# Patient Record
Sex: FEMALE | Race: WHITE | NOT HISPANIC OR LATINO | Employment: FULL TIME | URBAN - NONMETROPOLITAN AREA
[De-identification: names, ages, dates, MRNs, and addresses within clinical notes are randomized per-mention and may not be internally consistent; named-entity substitution may affect disease eponyms.]

---

## 2017-01-12 ENCOUNTER — MYC MEDICAL ADVICE (OUTPATIENT)
Dept: FAMILY MEDICINE | Facility: CLINIC | Age: 37
End: 2017-01-12

## 2017-01-12 NOTE — TELEPHONE ENCOUNTER
Clinic options did not work for Pt with Dr. Smith and Tessa LORA-BRANT.    Appt scheduled 3:20 pm THOM Gonzalez [facility] Sec

## 2017-01-12 NOTE — TELEPHONE ENCOUNTER
[facility] has available appointments at 1020, 1120, and 320. Uzma Gordillo RN

## 2017-01-13 ENCOUNTER — RADIANT APPOINTMENT (OUTPATIENT)
Dept: GENERAL RADIOLOGY | Facility: CLINIC | Age: 37
End: 2017-01-13
Attending: NURSE PRACTITIONER
Payer: COMMERCIAL

## 2017-01-13 ENCOUNTER — TELEPHONE (OUTPATIENT)
Dept: FAMILY MEDICINE | Facility: CLINIC | Age: 37
End: 2017-01-13

## 2017-01-13 ENCOUNTER — OFFICE VISIT (OUTPATIENT)
Dept: FAMILY MEDICINE | Facility: CLINIC | Age: 37
End: 2017-01-13
Payer: COMMERCIAL

## 2017-01-13 VITALS
DIASTOLIC BLOOD PRESSURE: 88 MMHG | SYSTOLIC BLOOD PRESSURE: 112 MMHG | HEART RATE: 80 BPM | TEMPERATURE: 99.2 F | WEIGHT: 207 LBS | OXYGEN SATURATION: 99 % | HEIGHT: 64 IN | BODY MASS INDEX: 35.34 KG/M2 | RESPIRATION RATE: 16 BRPM

## 2017-01-13 DIAGNOSIS — G47.00 PERSISTENT INSOMNIA: Primary | ICD-10-CM

## 2017-01-13 DIAGNOSIS — M25.512 LEFT SHOULDER PAIN, UNSPECIFIED CHRONICITY: Primary | ICD-10-CM

## 2017-01-13 DIAGNOSIS — M25.512 LEFT SHOULDER PAIN, UNSPECIFIED CHRONICITY: ICD-10-CM

## 2017-01-13 DIAGNOSIS — G47.00 PERSISTENT INSOMNIA: ICD-10-CM

## 2017-01-13 PROCEDURE — 73030 X-RAY EXAM OF SHOULDER: CPT | Mod: LT

## 2017-01-13 PROCEDURE — 99214 OFFICE O/P EST MOD 30 MIN: CPT | Performed by: NURSE PRACTITIONER

## 2017-01-13 RX ORDER — TEMAZEPAM 30 MG
30 CAPSULE ORAL
Qty: 31 CAPSULE | Refills: 2 | Status: SHIPPED | OUTPATIENT
Start: 2017-01-13 | End: 2017-05-01

## 2017-01-13 NOTE — MR AVS SNAPSHOT
After Visit Summary   1/13/2017    Aliza Orr    MRN: [id number]           Patient Information     Date Of Birth          1980        Visit Information        Provider Department      1/13/2017 3:40 PM Michelle Padilla APRN CNP [facility]        Today's Diagnoses     Left shoulder pain, unspecified chronicity    -  1     Persistent insomnia           Care Instructions    We will call you with the results if the X ray. May need to do an MRI if X ray is Ok.        Follow-ups after your visit        Who to contact     If you have questions or need follow up information about today's clinic visit or your schedule please contact [facility] directly at [phone].  Normal or non-critical lab and imaging results will be communicated to you by Testliohart, letter or phone within 4 business days after the clinic has received the results. If you do not hear from us within 7 days, please contact the clinic through Testliohart or phone. If you have a critical or abnormal lab result, we will notify you by phone as soon as possible.  Submit refill requests through SidelineSwap or call your pharmacy and they will forward the refill request to us. Please allow 3 business days for your refill to be completed.          Additional Information About Your Visit        MyChart Information     SidelineSwap gives you secure access to your electronic health record. If you see a primary care provider, you can also send messages to your care team and make appointments. If you have questions, please call your primary care clinic.  If you do not have a primary care provider, please call [phone] and they will assist you.        Care EveryWhere ID     This is your Care EveryWhere ID. This could be used by other organizations to access your [place] medical records  [id number]        Your Vitals Were     Pulse Temperature Respirations    80 99.2  F (37.3  C) (Tympanic) 16    Height BMI (Body Mass  "Index) Pulse Oximetry    5' 4\" (1.626 m) 35.51 kg/m2 99%    Last Period Breastfeeding?       12/29/2016 No        Blood Pressure from Last 3 Encounters:   01/13/17 112/88   08/20/15 110/62   06/09/15 138/78    Weight from Last 3 Encounters:   01/13/17 207 lb (93.895 kg)   08/20/15 194 lb (87.998 kg)   06/09/15 190 lb 12.8 oz (86.546 kg)                 Where to get your medicines      Some of these will need a paper prescription and others can be bought over the counter.  Ask your nurse if you have questions.     Bring a paper prescription for each of these medications    - temazepam 30 MG capsule       Primary Care Provider Office Phone # Fax #    Barb Tidwell [phone]       [facility] [address] [place] [id number]        Thank you!     Thank you for choosing [facility]  for your care. Our goal is always to provide you with excellent care. Hearing back from our patients is one way we can continue to improve our services. Please take a few minutes to complete the written survey that you may receive in the mail after your visit with us. Thank you!             Your Updated Medication List - Protect others around you: Learn how to safely use, store and throw away your medicines at www.disposemymeds.org.          This list is accurate as of: 1/13/17  4:28 PM.  Always use your most recent med list.                   Brand Name Dispense Instructions for use    temazepam 30 MG capsule    RESTORIL    31 capsule    Take 1 capsule (30 mg) by mouth nightly as needed for sleep         "

## 2017-01-13 NOTE — TELEPHONE ENCOUNTER
Called number listed below they said [employer] handles the coverage. Called [phone] Pharmacy help desk they no longer handle this policy as of the first of the yr. They gave me [facility] [employer] [phone]. They do not cover this police. Spoke with [facility] they said all these have changed.  Uyen Li do you want to see if you can send it something?  Lisa [facility] Sec

## 2017-01-13 NOTE — NURSING NOTE
"Chief Complaint   Patient presents with     Shoulder Pain     left     /88 mmHg  Pulse 80  Temp(Src) 99.2  F (37.3  C) (Tympanic)  Resp 16  Ht 5' 4\" (1.626 m)  Wt 207 lb (93.895 kg)  BMI 35.51 kg/m2  SpO2 99%  LMP 12/29/2016  Breastfeeding? No Estimated body mass index is 35.51 kg/(m^2) as calculated from the following:    Height as of this encounter: 5' 4\" (1.626 m).    Weight as of this encounter: 207 lb (93.895 kg).  bp completed using cuff size: regular      Health Maintenance that is potentially due pending provider review:  PHQ2 & PHQ9  Patient states she already had flu shot and will bring paperwork in next time.           "

## 2017-01-13 NOTE — Clinical Note
[facility]  [address] [facility] [address]  Phone: [phone]    January 30, 2017    Re: Aliza Orr  [address] [facility] [address]            To Whom It May Concern:         I'm writing this letter on behalf of my patient, Aliza Orr. She suffers from persistent insomnia, and has tried numerous sleep medications and strengths of medications. She has finally found one that has worked well for her. As you know, sleep is essential to our good health, thus we are asking you to cover this medication.       It is my pleasure to participate in Aliza's health care needs. Please feel free to call if any questions or concerns.             Sincerely,            Barb Smith MD

## 2017-01-13 NOTE — TELEPHONE ENCOUNTER
Aliza's plan changed the first of the year, they will only allow 15 capsules every 25 days, she needs a Prior Auth for 30 capsules for 30 days. Please call [employer] @ [phone] - Her ID# [id number]. She has 4 capsules left  Thank you    JAMIE DUMONT [facility]

## 2017-01-13 NOTE — PROGRESS NOTES
"  SUBJECTIVE:                                                    Aliza Orr is a 36 year old female who presents to clinic today for the following health issues:      Musculoskeletal problem/pain    LEFT SHOULDER PAIN       Duration: 3-4 months ago     Description  Location: left shoulder front cuff area    Intensity:  0/10 unless reaching behind her back then it is 10/10    Accompanying signs and symptoms: radiation of pain to arm, swelling    History  Previous similar problem: no   Previous evaluation:  none    Precipitating or alleviating factors:  Trauma or overuse: no   Aggravating factors include: exercise and reaching behind herself, lifting heavy boxes    Therapies tried and outcome: nothing. ibu does not help much, ices but not done enough to really know if it helps     Reaching behind back is very painful.   Cannot isolate any acute injury. However, does a lot of lifting at work, moving heavy boxes of paper with side to side movement.   Shoulder is Ok when laying down, rolling to side is painful, but Ok once on side.   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    Family members with rotator cuff issues- brother both shoulders, dad refuses to repair.    Insomnia  Takes Restoril for sleep which is helpful.   Needs refill.    Problem list and histories reviewed & adjusted, as indicated.  Additional history: as documented    Labs reviewed in [employer]  Problem list, Medication list, Allergies, and Medical/Social/Surgical histories reviewed in [facility] and updated as appropriate.    ROS:  Constitutional, HEENT, cardiovascular, pulmonary, gi and gu systems are negative, except as otherwise noted.    OBJECTIVE:                                                    /88 mmHg  Pulse 80  Temp(Src) 99.2  F (37.3  C) (Tympanic)  Resp 16  Ht 5' 4\" (1.626 m)  Wt 207 lb (93.895 kg)  BMI 35.51 kg/m2  SpO2 99%  LMP 12/29/2016  Breastfeeding? No  Body mass index is 35.51 " kg/(m^2).  GENERAL: healthy, alert and no distress  RESP: lungs clear to auscultation - no rales, rhonchi or wheezes  CV: regular rate and rhythm, normal S1 S2, no S3 or S4, no murmur, click or rub, no peripheral edema and peripheral pulses strong  MS: LUE exam shows normal strength and muscle mass. Limited ROM left arm, able to straight raise and partially lift arm to side. Unable to reach behind back without significant discomfort. No pinpoit tenderness noted    Diagnostic Test Results:  Xray - LEFT SHOULDER THREE OR MORE VIEWS    1/13/2017 3:44 PM        HISTORY: Pain in left shoulder.     COMPARISON: None.     FINDINGS:  There is normal osseous alignment.  No fractures are  identified.  There is a small calcific density projected just cephalad  to the humeral head and slightly lateral to the glenoid. This measures  about 0.6 cm in size. It could be due to calcific tendinitis. It could  also be due to a small joint loose body.                                                                       IMPRESSION:    1. No acute pathology.  2. Subtle osseous density just cephalad to the humeral head. The exact  location of this is uncertain. It could potentially be due to calcific  tendinitis. Could also be due to a joint loose body.         IVETTE HER MD     ASSESSMENT/PLAN:                                                        1. Left shoulder pain, unspecified chronicity  Possible work related injury. Will do an XR. If normal, then MRI. Both orders entered.  - XR Shoulder Left G/E 3 Views; Future  - MR Shoulder Left w/o Contrast; Future    2. Persistent insomnia  Managed with Restoril. Will provide refill  - temazepam (RESTORIL) 30 MG capsule; Take 1 capsule (30 mg) by mouth nightly as needed for sleep  Dispense: 31 capsule; Refill: 2    Patient agrees with plan of care as outlined     Patient Instructions   We will call you with the results if the X ray. May need to do an MRI if X ray is Ok.        Michelle Perez  Randy NP, APRN CNP  [facility]

## 2017-01-14 ASSESSMENT — PATIENT HEALTH QUESTIONNAIRE - PHQ9: SUM OF ALL RESPONSES TO PHQ QUESTIONS 1-9: 1

## 2017-01-16 RX ORDER — ALPRAZOLAM 0.25 MG
0.25 TABLET ORAL 3 TIMES DAILY PRN
Qty: 30 TABLET | Refills: 2 | Status: SHIPPED | OUTPATIENT
Start: 2017-01-16 | End: 2017-07-14

## 2017-01-16 NOTE — TELEPHONE ENCOUNTER
I don't want to do alprazolam.   Can we see how much it costs and if she is willing to cover the cost herself.

## 2017-01-16 NOTE — TELEPHONE ENCOUNTER
Called [facility] $32.50    Nora MOSER 20.39   Did let pt know about Medication. Pt requesting if we understand that they will only cover 15 she takes every day. Please check with Ins to see if they will cover for 30 days.  Lisa [facility] Sec

## 2017-01-17 ENCOUNTER — MYC MEDICAL ADVICE (OUTPATIENT)
Dept: FAMILY MEDICINE | Facility: CLINIC | Age: 37
End: 2017-01-17

## 2017-01-17 NOTE — TELEPHONE ENCOUNTER
Called pt to let her know med switched. Pt wants a Prior Auth sent in for her Tamezepam.  Pt does not want to switch medications. Can you send one in for Pt. Thank You  Lisa [facility] Sec

## 2017-01-18 NOTE — TELEPHONE ENCOUNTER
I called insurance and they said there is no prior auth available to override the qty limits of 45 capsules every 75 days.  She got 15 caps on the 12th her next fill is due the 25th.  I guess her options are to pay out of pocket for additional capsules or try another med.  ( I had her check on clonazepam and it is covered w/o a qty limit.)

## 2017-01-23 ENCOUNTER — HOSPITAL ENCOUNTER (OUTPATIENT)
Dept: MRI IMAGING | Facility: CLINIC | Age: 37
Discharge: HOME | End: 2017-01-23
Attending: NURSE PRACTITIONER | Admitting: NURSE PRACTITIONER
Payer: COMMERCIAL

## 2017-01-23 DIAGNOSIS — M25.512 LEFT SHOULDER PAIN, UNSPECIFIED CHRONICITY: ICD-10-CM

## 2017-01-23 PROCEDURE — 73221 MRI JOINT UPR EXTREM W/O DYE: CPT | Mod: LT

## 2017-01-24 ENCOUNTER — EXTERNAL ORDER RESULTS (OUTPATIENT)
Dept: FAMILY MEDICINE | Facility: CLINIC | Age: 37
End: 2017-01-24

## 2017-01-24 ENCOUNTER — TELEPHONE (OUTPATIENT)
Dept: FAMILY MEDICINE | Facility: CLINIC | Age: 37
End: 2017-01-24

## 2017-01-24 DIAGNOSIS — S41.012S: Primary | ICD-10-CM

## 2017-01-24 DIAGNOSIS — S46.922S: Primary | ICD-10-CM

## 2017-01-24 NOTE — PROGRESS NOTES
1/24/17. Received call from patient. Reviewed results of MRI completed 1/23/17 and need to be seen by Ortho. Referral order entered, phone number of Ortho given to patient. She will follow up. Michelle Padilla RNC, NP  [facility]

## 2017-01-25 NOTE — TELEPHONE ENCOUNTER
Patient returned phone call to discuss findings of MRI. Discussed MRI findings, shoulder injury, labrum tear. She agrees with referral to Ortho for consult and will call to schedule the appointment. Referral order placed.

## 2017-01-30 NOTE — TELEPHONE ENCOUNTER
Spoke with Randi at [facility]- We were discussing the Prior Auth for 20 min. She will call us back as she has to discuss with her manager. Insurance policy is 45 tabs for 75 days. Pt uses daily so requesting 30 for 30.  Lisa [facility] Sec

## 2017-01-30 NOTE — TELEPHONE ENCOUNTER
Pt is requesting us to call Ins again as a Prior Auth was never filed.   Called and spoke with Randi at [facility] - Prior Auth ran through and denied. Pt Ins will only pay for 45 per 75 days.   Randi called back and said that a Appeal needs to be faxed to the Appeals Dept at [phone].  The Letter has to be from  describing it is medically necessary.  Pt does not want to try a new med. Pt feels comfortable on this medication.  Lisa [facility] Sec

## 2017-01-30 NOTE — TELEPHONE ENCOUNTER
We can try this. Signed and in my outbasket   Can you also call pharmacy to see how much tamazepam 30 mg daily is for #30? If it is cheap, she may just want to pay for it out of pocket if this is denied.

## 2017-01-31 ENCOUNTER — OFFICE VISIT (OUTPATIENT)
Dept: ORTHOPEDICS | Facility: CLINIC | Age: 37
End: 2017-01-31
Payer: COMMERCIAL

## 2017-01-31 VITALS — WEIGHT: 204 LBS | HEIGHT: 64 IN | RESPIRATION RATE: 14 BRPM | BODY MASS INDEX: 34.83 KG/M2

## 2017-01-31 DIAGNOSIS — M25.512 LEFT SHOULDER PAIN, UNSPECIFIED CHRONICITY: Primary | ICD-10-CM

## 2017-01-31 PROCEDURE — 99243 OFF/OP CNSLTJ NEW/EST LOW 30: CPT | Performed by: ORTHOPAEDIC SURGERY

## 2017-01-31 NOTE — NURSING NOTE
"Chief Complaint   Patient presents with     Consult     Left shoulder pain, began approximately 3-4 months ago. Was unclasping her clothing and felt a sudden pain in the left shoulder. Denies weakness of the left shoulder. Referred by Michelle Padilla.       Initial Resp 14  Ht 1.626 m (5' 4\")  Wt 92.534 kg (204 lb)  BMI 35.00 kg/m2  LMP 12/29/2016 Estimated body mass index is 35 kg/(m^2) as calculated from the following:    Height as of this encounter: 1.626 m (5' 4\").    Weight as of this encounter: 92.534 kg (204 lb).  BP completed using cuff size: NA (Not Taken)    Levon Jones PA-C  Supervising physician: Addi Marino MD  Dept. of Orthopedics  [facility]          "

## 2017-01-31 NOTE — TELEPHONE ENCOUNTER
I called the [facility] pharmacy. The quoted the Temazepam 30 mg for Qty 30 $34.46.      Patient is aware and has been paying for it out of pocket.    Tessa CHAUDHRY RN

## 2017-01-31 NOTE — MR AVS SNAPSHOT
After Visit Summary   1/31/2017    Aliza Orr    MRN: [id number]           Patient Information     Date Of Birth          1980        Visit Information        Provider Department      1/31/2017 3:30 PM Addi Marino MD [facility]        Today's Diagnoses     Left shoulder pain, unspecified chronicity    -  1        Follow-ups after your visit        Additional Services     [facility] PT, HAND, AND CHIROPRACTIC REFERRAL       **This order will print in the [facility] Scheduling Office**    Physical Therapy, Hand Therapy and Chiropractic Care are available through:    *[facility]  *[facility]  *[facility]    Call one number to schedule at any of the above locations: [phone].    Your provider has referred you to: Physical Therapy at [facility] or [facility]    Indication/Reason for Referral: Possible labrum tear  Onset of Illness: 3-4 months  Therapy Orders: Evaluate and Treat  Special Programs: None  Special Request: 2-3 visits    Sue Rogers      Additional Comments for the Therapist or Chiropractor: Rotator cuff strengthening with tubing and scapular stabilization.    RTC on as needed basis      Please be aware that coverage of these services is subject to the terms and limitations of your health insurance plan.  Call member services at your health plan with any benefit or coverage questions.      Please bring the following to your appointment:    *Your personal calendar for scheduling future appointments  *Comfortable clothing                  Who to contact     If you have questions or need follow up information about today's clinic visit or your schedule please contact [facility] directly at [phone].  Normal or non-critical lab and imaging results will be communicated to you by MyChart, letter or phone within 4 business days after the clinic has received the results. If you do not hear from us  "within 7 days, please contact the clinic through Reqlut or phone. If you have a critical or abnormal lab result, we will notify you by phone as soon as possible.  Submit refill requests through Reqlut or call your pharmacy and they will forward the refill request to us. Please allow 3 business days for your refill to be completed.          Additional Information About Your Visit        [employer] [facility] Information     Reqlut gives you secure access to your electronic health record. If you see a primary care provider, you can also send messages to your care team and make appointments. If you have questions, please call your primary care clinic.  If you do not have a primary care provider, please call [phone] and they will assist you.        Care EveryWhere ID     This is your Care EveryWhere ID. This could be used by other organizations to access your [place] medical records  [id number]        Your Vitals Were     Respirations Height BMI (Body Mass Index) Last Period          14 1.626 m (5' 4\") 35.00 kg/m2 12/29/2016         Blood Pressure from Last 3 Encounters:   01/13/17 112/88   08/20/15 110/62   06/09/15 138/78    Weight from Last 3 Encounters:   01/31/17 92.534 kg (204 lb)   01/13/17 93.895 kg (207 lb)   08/20/15 87.998 kg (194 lb)              We Performed the Following     [facility] PT, HAND, AND CHIROPRACTIC REFERRAL        Primary Care Provider Office Phone # Fax #    Barb Tidwell [phone]       [facility] [address] [address]        Thank you!     Thank you for choosing [facility]  for your care. Our goal is always to provide you with excellent care. Hearing back from our patients is one way we can continue to improve our services. Please take a few minutes to complete the written survey that you may receive in the mail after your visit with us. Thank you!             Your Updated Medication List - Protect others around you: Learn how " to safely use, store and throw away your medicines at www.disposemymeds.org.          This list is accurate as of: 1/31/17  4:19 PM.  Always use your most recent med list.                   Brand Name Dispense Instructions for use    ALPRAZolam 0.25 MG tablet    XANAX    30 tablet    Take 1 tablet (0.25 mg) by mouth 3 times daily as needed for anxiety       temazepam 30 MG capsule    RESTORIL    31 capsule    Take 1 capsule (30 mg) by mouth nightly as needed for sleep

## 2017-01-31 NOTE — TELEPHONE ENCOUNTER
Per first fax received   The members prescription benefit coverage indicates a Prior Auth is not required    ** Sent a note back to them we have made several calls to pt [employer] ins. The did a Prior auth- Denied. Told to do appeal and fax to [phone]  We received a second fax- For Additional questions regarding how to submit an appeal request for your pat's medication, Please contact customer service using the  Number on the back of their RX benefits card    Pt notified and understood. Will wait until next month to run it again as we were sent approval and she was notified.   [facility] Sec

## 2017-01-31 NOTE — TELEPHONE ENCOUNTER
Signed letter faxed to [employer]-Appeals Dept. Waiting for response.  Lisa [facility] Sec

## 2017-02-01 NOTE — PATIENT INSTRUCTIONS
Please remember to call and schedule a follow up appointment if one was recommended at your earliest convenience.  Orthopedics CLINIC HOURS TELEPHONE NUMBER   Dr. Jamila Vasquez  Surgical Assistant      Margaux  Medical Assistant   Mondays- 8:00 - 4:00  [facility]    Tuesdays- 8:00-4:00  [facility] in the morning and [facility] in the afternoon   Thursdays- 8:00-4:00  [facility] in the morning and [facility] in the afternoon  Specialty schedulers:   [phone] to make an appointment with any Specialty Provider.   Main Clinic:   [phone] to make an appointment with your primary provider   Urgent Care locations:    [facility]   Monday-Friday Closed  Saturday-Sunday 9 am-5pm    Monday-Friday 12 pm - 8 pm  Saturday-Sunday 9 am-5 pm   [phone]     If SURGERY has been recommended, please call our Specialty Schedulers at [phone] to schedule your procedure.    If you need a medication refill, please contact your pharmacy. Please allow 3 business days for your refill to be completed.  Use Litographst (secure e-mail communication and access to your chart) to send a message or to make an appointment. Please ask how you can sign up for [facility].

## 2017-02-01 NOTE — PROGRESS NOTES
DATE OF VISIT:  2017      Ms. Dony Orr is a 36-year-old female referred from Michelle [place] for evaluation of left shoulder pain.  She developed shoulder pain 3-4 months ago.  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  She has pain reaching across her body or behind her back.  She reports pain rated 7/10, better if she lets the arm hang down.  MRI scan has been performed of the shoulder, showing abnormality of the superior labrum with blunting and possible tear.  There was no paralabral cyst, however.  No other abnormalities were noted.  There was a type 1 acromion.      PHYSICAL EXAMINATION:  Pain reaching overhead with the left shoulder, negative on the right.  She also had pain with full internal and external rotation of the left shoulder.  She had no pain with anterior apprehension testing or posterior testing.  There was discomfort along the trapezius with resisted internal and external rotation.  No pain with resisted abduction.  She had tenderness along the trapezius.  No tenderness of the rhomboid.  There was some tenderness to the AC joint and subacromial space.  There was no increased warmth or erythema.  Sensation and circulation are intact to the arms.  She had negative lift off test.      IMPRESSION:  Left shoulder impingement.  No evidence of subluxation or cuff tear.  It is questionable that the labrum abnormality means anything.  We will start with physical therapy for rotator cuff strengthening and scapular stabilization exercises.  If strengthening resolves this, we will have her resume activities as tolerated.  If it does not resolve it, we could consider evaluation for shoulder arthroscopy.  I would recommend she see Dr. Cardoso or Dr. Muro for that.         MILADYS TORIBIO MD             D: 2017 15:10   T: 2017 15:44   MT: PRESTON      Name:     DONY ORR   MRN:      [id number]        Account:      [id number]   :      1980            Visit Date:   01/31/2017      Document: [id number]

## 2017-02-01 NOTE — PROGRESS NOTES
Aliza Orr is a 36 year old female who is seen in consultation at the request of Michelle Padilla for left shoulder pain.    Past Medical History   Diagnosis Date     Genital herpes      HSIL (high grade squamous intraepithelial lesion) on Pap smear 1/16/12 1/24/12 colp - SHUKRI 2, 3, CIS     PMDD (premenstrual dysphoric disorder) 9/2/2014       Past Surgical History   Procedure Laterality Date     Tonsillectomy  2003     Surgical history of -   1997     ankle right     Laparoscopic tubal ligation  4/20/2011     Procedure:LAPAROSCOPIC TUBAL LIGATION; Laparoscopic Tubal Sterlization; Surgeon:RYANNE HAWK; Location:[facility] OR     Leep tx, cervical  1/31/12     SHUKRI 1,2,3     Soft tissue surgery  1997     Ankle surgery       Family History   Problem Relation Age of Onset     CANCER Maternal Grandmother      pancreatic     Other Cancer Maternal Grandmother      Pancreatic     CANCER Paternal Grandfather      liver,lung     Alcohol/Drug Paternal Grandfather      Other Cancer Paternal Grandfather      Liver, Lung, Lymph Node     Respiratory Daughter      GASTROINTESTINAL DISEASE Father      ulcer     Alcohol/Drug Father      DIABETES Father      Hypertension Father      Hyperlipidemia Father      Depression Mother      Hypertension Mother      Hyperlipidemia Mother      Depression/Anxiety Mother      HEART DISEASE Maternal Grandfather      Alcohol/Drug Paternal Grandmother      Alcohol/Drug Brother      CEREBROVASCULAR DISEASE Brother        Social History     Social History     Marital Status:      Spouse Name: N/A     Number of Children: N/A     Years of Education: N/A     Occupational History     Not on file.     Social History Main Topics     Smoking status: Former Smoker -- 0.50 packs/day for 15 years     Types: Cigarettes     Quit date: 01/01/2008     Smokeless tobacco: Never Used     Alcohol Use: Yes      Comment: Few beers a week     Drug Use: No     Sexual Activity:     Partners: Male     Birth Control/  "Protection: Surgical     Other Topics Concern      Service No     Blood Transfusions No     Caffeine Concern No     Occupational Exposure No     Hobby Hazards No     Sleep Concern Yes     Hip pain at night and not sleeping well.     Stress Concern No     Weight Concern No     Special Diet No     Back Care No     Exercise No     Bike Helmet No     Seat Belt Yes     Self-Exams No     Parent/Sibling W/ Cabg, Mi Or Angioplasty Before 65f 55m? No     Social History Narrative       Current Outpatient Prescriptions   Medication Sig Dispense Refill     ALPRAZolam (XANAX) 0.25 MG tablet Take 1 tablet (0.25 mg) by mouth 3 times daily as needed for anxiety 30 tablet 2     temazepam (RESTORIL) 30 MG capsule Take 1 capsule (30 mg) by mouth nightly as needed for sleep 31 capsule 2       Allergies   Allergen Reactions     Nkda [No Known Drug Allergies]        REVIEW OF SYSTEMS:  CONSTITUTIONAL:  NEGATIVE for fever, chills, change in weight, not feeling tired  SKIN:  NEGATIVE for worrisome rashes, no skin lumps, no skin ulcers and no non-healing wounds  EYES:  NEGATIVE for vision changes or irritation.  ENT/MOUTH:  NEGATIVE.  No hearing loss, no hoarseness, no difficulty swallowing.  RESP:  NEGATIVE. No cough or shortness of breath.  BREAST:  NEGATIVE for masses, tenderness or discharge  CV:  NEGATIVE for chest pain, palpitations or peripheral edema  GI:  NEGATIVE for nausea, abdominal pain, heartburn, or change in bowel habits  :  Negative. No dysuria, no hematuria  MUSCULOSKELETAL:  See HPI above  NEURO:  NEGATIVE . No headaches, no dizziness,  no numbness  ENDOCRINE:  NEGATIVE for temperature intolerance, skin/hair changes  HEME/ALLERGY/IMMUNE:  NEGATIVE for bleeding problems  PSYCHIATRIC:  NEGATIVE. no anxiety, no depression.     Exam:  Vitals: Resp 14  Ht 1.626 m (5' 4\")  Wt 92.534 kg (204 lb)  BMI 35.00 kg/m2  LMP 12/29/2016  BMI= Body mass index is 35 kg/(m^2).  Constitutional:  healthy, alert and no " distress  Neuro: Alert and Oriented x 3, Gait normal. Sensation grossly WNL.  Psych: Affect normal   Respiratory: Breathing not labored.  Cardiovascular: normal peripheral pulses  Lymph: no adenopathy  Skin: No rashes,worrisome lesions or skin problems

## 2017-02-27 ENCOUNTER — HOSPITAL ENCOUNTER (OUTPATIENT)
Dept: PHYSICAL THERAPY | Facility: CLINIC | Age: 37
Setting detail: THERAPIES SERIES
End: 2017-02-27
Attending: PHYSICIAN ASSISTANT
Payer: COMMERCIAL

## 2017-02-27 PROCEDURE — 40000718 ZZHC STATISTIC PT DEPARTMENT ORTHO VISIT: Performed by: PHYSICAL THERAPIST

## 2017-02-27 PROCEDURE — 97140 MANUAL THERAPY 1/> REGIONS: CPT | Mod: GP | Performed by: PHYSICAL THERAPIST

## 2017-02-27 PROCEDURE — 97110 THERAPEUTIC EXERCISES: CPT | Mod: GP | Performed by: PHYSICAL THERAPIST

## 2017-02-27 PROCEDURE — 97161 PT EVAL LOW COMPLEX 20 MIN: CPT | Mod: GP | Performed by: PHYSICAL THERAPIST

## 2017-02-27 NOTE — PROGRESS NOTES
02/27/17 1500   General Information   Type of Visit Initial OP Ortho PT Evaluation   Start of Care Date 02/27/17   Referring Physician Levon Jones, MILI    Patient/Family Goals Statement be able t move arm    Orders Evaluate and Treat   Orders Comment Additional Comments for the Therapist or Chiropractor: Rotator cuff strengthening with tubing and scapular stabilization.   RTC on as needed basis   Date of Order 01/31/17   Insurance Type Other   Insurance Comments/Visits Authorized [employer]   Medical Diagnosis Left shoulder pain, unspecified chronicity M25.512  - Primary   Surgical/Medical history reviewed Yes   Precautions/Limitations no known precautions/limitations   Body Part(s)   Body Part(s) Shoulder   Presentation and Etiology   Pertinent history of current problem (include personal factors and/or comorbidities that impact the POC) Pt report insidious onset L shoulder on going for approx. 3-4 months. Pt reports no previous injury or increase in activity. She radicular symptoms or previous shoulder pain. Pt reports she is normally good however certain movements significantly increase pain ie trying to take off her bra. PMH: tonsillectomy  2000, ankle surgery 1997   Impairments A. Pain;B. Decreased WB tolerance;D. Decreased ROM;M. Locking or catching   Functional Limitations perform activities of daily living   Symptom Location L shoulder   How/Where did it occur From insidious onset   Onset date of current episode/exacerbation 11/27/16   Chronicity New   Pain rating (0-10 point scale) Worst (/10);Best (/10)   Best (/10) 0   Worst (/10) 10   Pain quality A. Sharp   Frequency of pain/symptoms D. Other   Pain frequency comment w certain movemetns   Pain/symptoms exacerbated by C. Lifting;M. Other   Pain exacerbation comment reaching behind back   Pain/symptoms eased by E. Changing positions   Progression of symptoms since onset: Unchanged   Current / Previous Interventions   Diagnostic Tests: MRI   MRI  Results Results   MRI results 1. Abnormal-appearing superior labrum which blunted and irregular, possibly representing a tear. There is a tear to the base of the posterior superior labrum. If clinically indicated, MR arthrogram could be obtained to further define these regions. 2. No other significant abnormalities.   Current Level of Function   Current Community Support Family/friend caregiver   Patient role/employment history A. 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 No   Per patient - Fall with injury in past year? No   Is patient a fall risk? No   Functional Scales   Functional Scales Other   Other Scales  SPADI 43%   Shoulder Objective Findings   Side (if bilateral, select both right and left) Left   Cervical Screen (ROM, quadrant) WNL    Thoracic Mobility Screen poor and TTP    Shoulder Flexibility Comments Biceps MMT: 5/5    Palacios-Pedro Test pos   Ajo's Test pos   Sulcus Test neg   Crossover Test positive   Shoulder Special Tests Comments bear hug: neg, speeds: neg   Palpation TTP medial boarder of scap, subscapularis, pec minor   Left Shoulder Flexion AROM 170   Left Shoulder Flexion PROM 180    Left Shoulder Abduction AROM 90   Left Shoulder Abduction PROM 180    Left Shoulder ER AROM C2   Left Shoulder ER PROM 90   Left Shoulder IR AROM L1 -w shaking   Left Shoulder IR PROM 60 w min pain    Left Shoulder Flexion Strength 5/5   Left Shoulder Abduction Strength 5/5   Left Shoulder ER Strength 4/5   Left Shoulder IR Strength 4/5   Planned Therapy Interventions   Planned Therapy Interventions joint mobilization;manual therapy;neuromuscular re-education;strengthening;ROM;stretching   Planned Modality Interventions   Planned Modality Interventions Cryotherapy;Electrical  stimulation;TENS;Traction   Clinical Impression   Criteria for Skilled Therapeutic Interventions Met yes, treatment indicated   PT Diagnosis Decreased ROM, weakness and pain in L shoulder associated w mechanical dysfunction    Influenced by the following impairments Decreased ROM, weakness and pain    Functional limitations due to impairments lifting overhead, don/doff bra   Clinical Presentation Stable/Uncomplicated   Clinical Presentation Rationale Pt is pleasant 36 year old female who presents w L shoulder pain. Pt has no other co-mobidides and had good improvement with MT. Thus pt is considered low complxity    Clinical Decision Making (Complexity) Low complexity   Therapy Frequency 1 time/week   Predicted Duration of Therapy Intervention (days/wks) 6 weeks   Risk & Benefits of therapy have been explained Yes   Patient, Family & other staff in agreement with plan of care Yes   Education Assessment   Preferred Learning Style Listening;Reading;Demonstration;Pictures/video   Barriers to Learning No barriers   ORTHO GOALS   PT Ortho Eval Goals 1;2;3;4   Ortho Goal 1   Goal Identifier ROM   Goal Description Pt will demonstrate full  GH AROM with less than 1/10 pain in order to be able to don/doff jacket/shirt to return to PLOF w/o  pain.   Target Date 03/20/17   Ortho Goal 2   Goal Identifier ROM   Goal Description Pt will demonstrate 180 deg GH AROM flex w less than 1/10 pain to allow her to reach overhead  into cupboard to put dishes away without pain   Target Date 03/20/17   Ortho Goal 3   Goal Identifier MMT   Goal Description Pt will demonstrate 5/5 GH abd/add/ ER/IR in order to be able to return to PLOF and complete work tasks    Target Date 04/10/17   Ortho Goal 4   Goal Identifier SPADI   Goal Description Pt will report <10% disability on SPADI to demonstrate improved ability to complete daily activities and demonstrate significant clinical improvement   Target Date 04/10/17   Total Evaluation Time   Total  Evaluation Time 40 (15 eval, 10 TE, 15 MT)      Cherry Sarkar  Physical Therapist  [address] [address]  [email or web address]   [email or web address]   Office: [phone] Fax: [phone]

## 2017-03-06 ENCOUNTER — HOSPITAL ENCOUNTER (OUTPATIENT)
Dept: PHYSICAL THERAPY | Facility: CLINIC | Age: 37
Setting detail: THERAPIES SERIES
End: 2017-03-06
Attending: PHYSICIAN ASSISTANT
Payer: COMMERCIAL

## 2017-03-06 PROCEDURE — 97110 THERAPEUTIC EXERCISES: CPT | Mod: GP | Performed by: PHYSICAL THERAPIST

## 2017-03-06 PROCEDURE — 40000718 ZZHC STATISTIC PT DEPARTMENT ORTHO VISIT: Performed by: PHYSICAL THERAPIST

## 2017-03-06 PROCEDURE — 97140 MANUAL THERAPY 1/> REGIONS: CPT | Mod: GP | Performed by: PHYSICAL THERAPIST

## 2017-03-22 ENCOUNTER — MYC MEDICAL ADVICE (OUTPATIENT)
Dept: FAMILY MEDICINE | Facility: CLINIC | Age: 37
End: 2017-03-22

## 2017-03-22 DIAGNOSIS — G47.00 PERSISTENT INSOMNIA: ICD-10-CM

## 2017-03-22 NOTE — TELEPHONE ENCOUNTER
Lisa, can you try this again? This is from your note in January:    Per first fax received   The members prescription benefit coverage indicates a Prior Auth is not required  ** Sent a note back to them we have made several calls to pt medica ins. The did a Prior auth- Denied. Told to do appeal and fax to [phone]  We received a second fax- For Additional questions regarding how to submit an appeal request for your pat's medication, Please contact customer service using the  Number on the back of their RX benefits card     Pt notified and understood. Will wait until next month to run it again as we were sent approval and she was notified.   Lisa [place] Sec

## 2017-03-23 NOTE — TELEPHONE ENCOUNTER
This is the first I have seen this.  Can you please verify what drug this about?  I see both alprazolam and temazepam as active on her med list or is it for some thing else??    Thanks    Uyen GARRIDO(R)  [facility]  X-Ray [phone]

## 2017-03-24 NOTE — TELEPHONE ENCOUNTER
Resent in 3/24/17 Cover my med  See 1/13/17  Pt is currently paying $24.00 for 30 tab.  Ins will only pay for 15 a month she pays for 15 as Ins will only pay for 15 per 30.  Called [phone] transferred to [facility] [phone] On hold x 20 min  Resent in per cover my med for Qty over ride  Lisa [place]n [place] Sec

## 2017-03-28 NOTE — TELEPHONE ENCOUNTER
Spoke with [facility] Autumn Jeremysaravanan [phone]  This was approved  3/26/17-3/26/18.  I Transferred her to [facility] as pt is not due to fill her medication until 3/29/17. [facility] notified pt.  Pt will be able to fill at that time 60/30 for Temazepam.   [facility]

## 2017-04-10 NOTE — PROGRESS NOTES
Outpatient Physical Therapy Discharge Note     Patient: Aliza Orr  : 1980    Beginning/End Dates of Reporting Period:  17 to 4/10/2017    Referring Provider: Levon Jones PA-C    Therapy Diagnosis: Decreased ROM, weakness and pain in L shoulder associated w mechanical dysfunction      Client Self Report: Pt reports she is doing really well. She is only having min pain with IR. Pt reports she has not been great about HEP     Objective Measurements:  Objective Measure: Flex  Details: 170    Objective Measure: abd  Details: 140    Objective Measure: IR  Details: bra line       Goals:  Goal Identifier ROM   Goal Description Pt will demonstrate full  GH AROM with less than 1/10 pain in order to be able to don/doff jacket/shirt to return to PLOF w/o  pain.   Target Date 17   Date Met   3/6/17   Progress:goal met     Goal Identifier ROM   Goal Description Pt will demonstrate 180 deg GH AROM flex w less than 1/10 pain to allow her to reach overhead  into cupboard to put dishes away without pain   Target Date 17   Date Met   3/6/17   Progress:goal met     Goal Identifier MMT   Goal Description Pt will demonstrate 5/5 GH abd/add/ ER/IR in order to be able to return to PLOF and complete work tasks    Target Date 04/10/17   Date Met      Progress:not assessed at last visit     Goal Identifier SPADI   Goal Description Pt will report <10% disability on SPADI to demonstrate improved ability to complete daily activities and demonstrate significant clinical improvement   Target Date 04/10/17   Date Met      Progress:not assessed at last visit       Progress Toward Goals:   Progress this reporting period: Pt has been seen for 2 visits in therapy. In that time, pt had good improvement in ROM and significantly reduced pain. Planned to continue with strengthening however Pt has failed to schedule f/u visits within 30 days from last visit thus is being d/c from therapy at this time. Objective measures are  all taken from last visit. Current status is unknown at this time.          Plan:  Discharge from therapy.    Discharge:    Reason for Discharge: Patient has failed to schedule further appointments.    Equipment Issued: NA    Discharge Plan: Patient to continue home program.    Cherry Sarkar  Physical Therapist  [address] [address]  [email or web address]   [email or web address]   Office: [phone] Fax: [phone]

## 2017-05-01 DIAGNOSIS — G47.00 PERSISTENT INSOMNIA: ICD-10-CM

## 2017-05-01 NOTE — TELEPHONE ENCOUNTER
Aliza is out of this medication, she called in the refill on 4/27/17 but did not get sent to you. Please address. Thank you    Temazepam 30mg      Last Written Prescription Date: 01/13/2017  Last Fill Quantity: 31,  # refills: 2   Last Office Visit with [facility], [facility] or [facility] prescribing provider: 01/13/2017    JAMIE DUMONT [facility]

## 2017-05-02 RX ORDER — TEMAZEPAM 30 MG
30 CAPSULE ORAL
Qty: 31 CAPSULE | Refills: 2 | Status: SHIPPED | OUTPATIENT
Start: 2017-05-02 | End: 2017-07-14

## 2017-07-14 ENCOUNTER — OFFICE VISIT (OUTPATIENT)
Dept: FAMILY MEDICINE | Facility: CLINIC | Age: 37
End: 2017-07-14
Payer: COMMERCIAL

## 2017-07-14 VITALS
WEIGHT: 202 LBS | TEMPERATURE: 98.2 F | BODY MASS INDEX: 34.67 KG/M2 | HEART RATE: 80 BPM | RESPIRATION RATE: 16 BRPM | OXYGEN SATURATION: 97 % | SYSTOLIC BLOOD PRESSURE: 102 MMHG | DIASTOLIC BLOOD PRESSURE: 62 MMHG

## 2017-07-14 DIAGNOSIS — G47.00 PERSISTENT INSOMNIA: ICD-10-CM

## 2017-07-14 DIAGNOSIS — Z98.890 S/P LEEP: ICD-10-CM

## 2017-07-14 DIAGNOSIS — Z13.6 CARDIOVASCULAR SCREENING; LDL GOAL LESS THAN 160: ICD-10-CM

## 2017-07-14 DIAGNOSIS — R87.613 HSIL ON PAP SMEAR OF CERVIX: ICD-10-CM

## 2017-07-14 DIAGNOSIS — Z00.00 ROUTINE GENERAL MEDICAL EXAMINATION AT A HEALTH CARE FACILITY: Primary | ICD-10-CM

## 2017-07-14 PROCEDURE — G0145 SCR C/V CYTO,THINLAYER,RESCR: HCPCS | Performed by: FAMILY MEDICINE

## 2017-07-14 PROCEDURE — 99395 PREV VISIT EST AGE 18-39: CPT | Performed by: FAMILY MEDICINE

## 2017-07-14 PROCEDURE — 87624 HPV HI-RISK TYP POOLED RSLT: CPT | Performed by: FAMILY MEDICINE

## 2017-07-14 RX ORDER — TEMAZEPAM 30 MG
30 CAPSULE ORAL
Qty: 30 CAPSULE | Refills: 2 | Status: SHIPPED | OUTPATIENT
Start: 2017-07-14 | End: 2017-10-26

## 2017-07-14 NOTE — NURSING NOTE
"Chief Complaint   Patient presents with     Physical     yearly       Initial /62  Pulse 80  Temp 98.2  F (36.8  C) (Tympanic)  Resp 16  Wt 202 lb (91.6 kg)  LMP 06/26/2017  SpO2 97%  BMI 34.67 kg/m2 Estimated body mass index is 34.67 kg/(m^2) as calculated from the following:    Height as of 1/31/17: 5' 4\" (1.626 m).    Weight as of this encounter: 202 lb (91.6 kg).  Medication Reconciliation: complete    Health Maintenance that is potentially due pending provider review:  Pap Smear    Possibly completing today per provider review.    "

## 2017-07-14 NOTE — MR AVS SNAPSHOT
After Visit Summary   7/14/2017    Aliza Orr    MRN: [id number]           Patient Information     Date Of Birth          1980        Visit Information        Provider Department      7/14/2017 10:40 AM Barb Tidwell MD [facility]        Today's Diagnoses     Routine general medical examination at a health care facility    -  1    Persistent insomnia        SHUKRI 3        S/P LEEP        CARDIOVASCULAR SCREENING; LDL GOAL LESS THAN 160          Care Instructions      Preventive Health Recommendations  Female Ages 26 - 39  Yearly exam:   See your health care provider every year in order to    Review health changes.     Discuss preventive care.      Review your medicines if you your doctor has prescribed any.    Until age 30: Get a Pap test every three years (more often if you have had an abnormal result).    After age 30: Talk to your doctor about whether you should have a Pap test every 3 years or have a Pap test with HPV screening every 5 years.   You do not need a Pap test if your uterus was removed (hysterectomy) and you have not had cancer.  You should be tested each year for STDs (sexually transmitted diseases), if you're at risk.   Talk to your provider about how often to have your cholesterol checked.  If you are at risk for diabetes, you should have a diabetes test (fasting glucose).  Shots: Get a flu shot each year. Get a tetanus shot every 10 years.   Nutrition:     Eat at least 5 servings of fruits and vegetables each day.    Eat whole-grain bread, whole-wheat pasta and brown rice instead of white grains and rice.    Talk to your provider about Calcium and Vitamin D.     Lifestyle    Exercise at least 150 minutes a week (30 minutes a day, 5 days of the week). This will help you control your weight and prevent disease.    Limit alcohol to one drink per day.    No smoking.     Wear sunscreen to prevent skin cancer.    See your dentist every six months for an exam  and cleaning.            Follow-ups after your visit        Who to contact     If you have questions or need follow up information about today's clinic visit or your schedule please contact [facility] directly at [phone].  Normal or non-critical lab and imaging results will be communicated to you by MyChart, letter or phone within 4 business days after the clinic has received the results. If you do not hear from us within 7 days, please contact the clinic through [employer] or phone. If you have a critical or abnormal lab result, we will notify you by phone as soon as possible.  Submit refill requests through [employer] or call your pharmacy and they will forward the refill request to us. Please allow 3 business days for your refill to be completed.          Additional Information About Your Visit        [employer] Information     [employer] gives you secure access to your electronic health record. If you see a primary care provider, you can also send messages to your care team and make appointments. If you have questions, please call your primary care clinic.  If you do not have a primary care provider, please call [phone] and they will assist you.        Care EveryWhere ID     This is your Care EveryWhere ID. This could be used by other organizations to access your [facility] medical records  [id number]        Your Vitals Were     Pulse Temperature Respirations Last Period Pulse Oximetry BMI (Body Mass Index)    80 98.2  F (36.8  C) (Tympanic) 16 06/26/2017 97% 34.67 kg/m2       Blood Pressure from Last 3 Encounters:   07/14/17 102/62   01/13/17 112/88   08/20/15 110/62    Weight from Last 3 Encounters:   07/14/17 202 lb (91.6 kg)   01/31/17 204 lb (92.5 kg)   01/13/17 207 lb (93.9 kg)              Today, you had the following     No orders found for display         Today's Medication Changes          These changes are accurate as of: 7/14/17 11:03 AM.  If you have any questions, ask your nurse or  doctor.               Stop taking these medicines if you haven't already. Please contact your care team if you have questions.     ALPRAZolam 0.25 MG tablet   Commonly known as:  XANAX   Stopped by:  Barb Tidwell MD                Where to get your medicines      Some of these will need a paper prescription and others can be bought over the counter.  Ask your nurse if you have questions.     Bring a paper prescription for each of these medications     temazepam 30 MG capsule                Primary Care Provider Office Phone # Fax #    Barb Tidwell [phone]       [facility] [address] [facility] [address]        Equal Access to Services     [facility]: Hadii aad ku hadasho Soomaali, waaxda luqadaha, qaybta kaalmada adeegyada, waxgumaro kelly hayaan adeeg niya sawyer . So [facility] [phone].    ATENCIÓN: Si habla español, tiene a wilks disposición servicios gratuitos de asistencia lingüística. Llame al [phone].    We comply with applicable federal civil rights laws and [place] laws. We do not discriminate on the basis of race, color, national origin, age, disability sex, sexual orientation or gender identity.            Thank you!     Thank you for choosing [facility]  for your care. Our goal is always to provide you with excellent care. Hearing back from our patients is one way we can continue to improve our services. Please take a few minutes to complete the written survey that you may receive in the mail after your visit with us. Thank you!             Your Updated Medication List - Protect others around you: Learn how to safely use, store and throw away your medicines at www.disposemymeds.org.          This list is accurate as of: 7/14/17 11:03 AM.  Always use your most recent med list.                   Brand Name Dispense Instructions for use Diagnosis    temazepam 30 MG capsule    RESTORIL    30 capsule    Take 1 capsule (30 mg) by mouth  nightly as needed for sleep    Persistent insomnia
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
## 2017-07-18 LAB
COPATH REPORT: NORMAL
PAP: NORMAL

## 2017-07-19 LAB
FINAL DIAGNOSIS: NORMAL
HPV HR 12 DNA CVX QL NAA+PROBE: NEGATIVE
HPV16 DNA SPEC QL NAA+PROBE: NEGATIVE
HPV18 DNA SPEC QL NAA+PROBE: NEGATIVE
SPECIMEN DESCRIPTION: NORMAL

## 2017-08-30 ENCOUNTER — TELEPHONE (OUTPATIENT)
Dept: FAMILY MEDICINE | Facility: CLINIC | Age: 37
End: 2017-08-30

## 2017-08-30 NOTE — TELEPHONE ENCOUNTER
Please re-apply for the Plan Limitations Prior Auth on Temazepam 30mg by calling [employer] at [phone]. Her ID#  [id number].  Our records show the last P.A. Was approved in March of 2017 and good thru March 2018. Spoke to a rep and because this medication is Non-Formulary the P.A. Is only good for 5 or 6 months. We are dispensing 30 caps for 30 days, They will only cover 15 caps for 30 days.  Please let pharmacy know when approved.  Thank you    JAMIE DUMONT [facility]

## 2017-10-25 ENCOUNTER — MYC MEDICAL ADVICE (OUTPATIENT)
Dept: FAMILY MEDICINE | Facility: CLINIC | Age: 37
End: 2017-10-25

## 2017-10-25 DIAGNOSIS — G47.00 PERSISTENT INSOMNIA: ICD-10-CM

## 2017-10-26 RX ORDER — TEMAZEPAM 30 MG
30 CAPSULE ORAL
Qty: 30 CAPSULE | Refills: 2 | Status: SHIPPED | OUTPATIENT
Start: 2017-10-26 | End: 2018-01-23

## 2018-01-23 ENCOUNTER — MYC REFILL (OUTPATIENT)
Dept: FAMILY MEDICINE | Facility: CLINIC | Age: 38
End: 2018-01-23

## 2018-01-23 DIAGNOSIS — G47.00 PERSISTENT INSOMNIA: ICD-10-CM

## 2018-01-23 RX ORDER — TEMAZEPAM 30 MG
30 CAPSULE ORAL
Qty: 30 CAPSULE | Refills: 2 | Status: SHIPPED | OUTPATIENT
Start: 2018-01-23 | End: 2018-04-27

## 2018-01-24 NOTE — TELEPHONE ENCOUNTER
Message from Thanh:  Uzma Li, RN Tue Jan 23, 2018 8:46 AM        ----- Message -----   From: Aliza Orr   Sent: 1/23/2018 6:31 AM   To: [facility] Care Team  Subject: Medication Renewal Request     Original authorizing provider: MD Aliza Patrick would like a refill of the following medications:  temazepam (RESTORIL) 30 MG capsule [Barb Tidwell MD]    Preferred pharmacy: [address]    Comment:  Hi Dr. Tidwell, I am out of refills on this prescription. I don't need it filled right away but if it could be sent to the pharmacy and put on hold for now that would be great. Just a reminder that insurance will only approve 30 pills/month. Thank you and let me know if you have any questions for me. Aliza

## 2018-03-07 ENCOUNTER — TRANSFERRED RECORDS (OUTPATIENT)
Dept: HEALTH INFORMATION MANAGEMENT | Facility: CLINIC | Age: 38
End: 2018-03-07

## 2018-04-27 ENCOUNTER — MYC REFILL (OUTPATIENT)
Dept: FAMILY MEDICINE | Facility: CLINIC | Age: 38
End: 2018-04-27

## 2018-04-27 DIAGNOSIS — G47.00 PERSISTENT INSOMNIA: ICD-10-CM

## 2018-04-27 RX ORDER — TEMAZEPAM 30 MG
30 CAPSULE ORAL
Qty: 30 CAPSULE | Refills: 2 | Status: SHIPPED | OUTPATIENT
Start: 2018-04-27 | End: 2018-07-30

## 2018-04-27 NOTE — TELEPHONE ENCOUNTER
Message from Thanh:  Uzma Li RN Fri Apr 27, 2018 8:11 AM        ----- Message -----   From: Aliza Orr   Sent: 4/27/2018 7:17 AM   To: [facility] Team  Subject: Medication Renewal Request     Original authorizing provider: MD Aliza Patrick would like a refill of the following medications:  temazepam (RESTORIL) 30 MG capsule [Barb Tidwell MD]    Preferred pharmacy: [address]    Comment:  Still working well

## 2018-04-27 NOTE — TELEPHONE ENCOUNTER
Faxed to the [facility].    Maricel [place]   [facility] [place]

## 2018-07-30 DIAGNOSIS — G47.00 PERSISTENT INSOMNIA: ICD-10-CM

## 2018-07-31 RX ORDER — TEMAZEPAM 30 MG
30 CAPSULE ORAL
Qty: 30 CAPSULE | Refills: 2 | Status: SHIPPED | OUTPATIENT
Start: 2018-07-31 | End: 2018-10-25

## 2018-10-25 ENCOUNTER — MYC REFILL (OUTPATIENT)
Dept: FAMILY MEDICINE | Facility: CLINIC | Age: 38
End: 2018-10-25

## 2018-10-25 DIAGNOSIS — G47.00 PERSISTENT INSOMNIA: ICD-10-CM

## 2018-10-26 NOTE — TELEPHONE ENCOUNTER
Message from MyChart:  Original authorizing provider: MD Aliza Patrick would like a refill of the following medications:  temazepam (RESTORIL) 30 MG capsule [Barb Tidwell MD]    Preferred pharmacy: [address]    Comment:  Still working well. Thank you.

## 2018-10-29 RX ORDER — TEMAZEPAM 30 MG
30 CAPSULE ORAL
Qty: 30 CAPSULE | Refills: 2 | Status: SHIPPED | OUTPATIENT
Start: 2018-10-29 | End: 2019-01-23

## 2019-01-23 ENCOUNTER — MYC REFILL (OUTPATIENT)
Dept: FAMILY MEDICINE | Facility: CLINIC | Age: 39
End: 2019-01-23

## 2019-01-23 DIAGNOSIS — G47.00 PERSISTENT INSOMNIA: ICD-10-CM

## 2019-01-24 RX ORDER — TEMAZEPAM 30 MG
30 CAPSULE ORAL
Qty: 30 CAPSULE | Refills: 2 | Status: SHIPPED | OUTPATIENT
Start: 2019-01-24 | End: 2019-04-02

## 2019-03-04 ENCOUNTER — MYC MEDICAL ADVICE (OUTPATIENT)
Dept: FAMILY MEDICINE | Facility: CLINIC | Age: 39
End: 2019-03-04

## 2019-03-07 ENCOUNTER — OFFICE VISIT (OUTPATIENT)
Dept: FAMILY MEDICINE | Facility: CLINIC | Age: 39
End: 2019-03-07
Payer: COMMERCIAL

## 2019-03-07 VITALS
BODY MASS INDEX: 36.15 KG/M2 | DIASTOLIC BLOOD PRESSURE: 96 MMHG | OXYGEN SATURATION: 97 % | RESPIRATION RATE: 18 BRPM | HEART RATE: 89 BPM | HEIGHT: 65 IN | WEIGHT: 217 LBS | TEMPERATURE: 98.3 F | SYSTOLIC BLOOD PRESSURE: 142 MMHG

## 2019-03-07 DIAGNOSIS — E66.01 MORBID OBESITY (H): ICD-10-CM

## 2019-03-07 DIAGNOSIS — I10 BENIGN ESSENTIAL HYPERTENSION: Primary | ICD-10-CM

## 2019-03-07 LAB
ANION GAP SERPL CALCULATED.3IONS-SCNC: 10 MMOL/L (ref 3–14)
BUN SERPL-MCNC: 15 MG/DL (ref 7–30)
CALCIUM SERPL-MCNC: 8.8 MG/DL (ref 8.5–10.1)
CHLORIDE SERPL-SCNC: 102 MMOL/L (ref 94–109)
CO2 SERPL-SCNC: 24 MMOL/L (ref 20–32)
CREAT SERPL-MCNC: 0.77 MG/DL (ref 0.52–1.04)
ERYTHROCYTE [DISTWIDTH] IN BLOOD BY AUTOMATED COUNT: 13.3 % (ref 10–15)
GFR SERPL CREATININE-BSD FRML MDRD: >90 ML/MIN/{1.73_M2}
GLUCOSE SERPL-MCNC: 87 MG/DL (ref 70–99)
HCT VFR BLD AUTO: 38.9 % (ref 35–47)
HGB BLD-MCNC: 12.9 G/DL (ref 11.7–15.7)
MCH RBC QN AUTO: 28.5 PG (ref 26.5–33)
MCHC RBC AUTO-ENTMCNC: 33.2 G/DL (ref 31.5–36.5)
MCV RBC AUTO: 86 FL (ref 78–100)
PLATELET # BLD AUTO: 281 10E9/L (ref 150–450)
POTASSIUM SERPL-SCNC: 3.9 MMOL/L (ref 3.4–5.3)
RBC # BLD AUTO: 4.53 10E12/L (ref 3.8–5.2)
SODIUM SERPL-SCNC: 136 MMOL/L (ref 133–144)
TSH SERPL DL<=0.005 MIU/L-ACNC: 1.86 MU/L (ref 0.4–4)
WBC # BLD AUTO: 9.5 10E9/L (ref 4–11)

## 2019-03-07 PROCEDURE — 85027 COMPLETE CBC AUTOMATED: CPT | Performed by: FAMILY MEDICINE

## 2019-03-07 PROCEDURE — 36415 COLL VENOUS BLD VENIPUNCTURE: CPT | Performed by: FAMILY MEDICINE

## 2019-03-07 PROCEDURE — 80048 BASIC METABOLIC PNL TOTAL CA: CPT | Performed by: FAMILY MEDICINE

## 2019-03-07 PROCEDURE — 84443 ASSAY THYROID STIM HORMONE: CPT | Performed by: FAMILY MEDICINE

## 2019-03-07 PROCEDURE — 99214 OFFICE O/P EST MOD 30 MIN: CPT | Performed by: FAMILY MEDICINE

## 2019-03-07 RX ORDER — LISINOPRIL 10 MG/1
10 TABLET ORAL DAILY
Qty: 90 TABLET | Refills: 1 | Status: SHIPPED | OUTPATIENT
Start: 2019-03-07 | End: 2019-06-03

## 2019-03-07 ASSESSMENT — PATIENT HEALTH QUESTIONNAIRE - PHQ9
SUM OF ALL RESPONSES TO PHQ QUESTIONS 1-9: 1
5. POOR APPETITE OR OVEREATING: NOT AT ALL

## 2019-03-07 ASSESSMENT — ANXIETY QUESTIONNAIRES
6. BECOMING EASILY ANNOYED OR IRRITABLE: NOT AT ALL
2. NOT BEING ABLE TO STOP OR CONTROL WORRYING: NOT AT ALL
GAD7 TOTAL SCORE: 0
3. WORRYING TOO MUCH ABOUT DIFFERENT THINGS: NOT AT ALL
5. BEING SO RESTLESS THAT IT IS HARD TO SIT STILL: NOT AT ALL
1. FEELING NERVOUS, ANXIOUS, OR ON EDGE: NOT AT ALL
7. FEELING AFRAID AS IF SOMETHING AWFUL MIGHT HAPPEN: NOT AT ALL

## 2019-03-07 ASSESSMENT — MIFFLIN-ST. JEOR: SCORE: 1652.25

## 2019-03-07 ASSESSMENT — PAIN SCALES - GENERAL: PAINLEVEL: MILD PAIN (2)

## 2019-03-07 NOTE — NURSING NOTE
"Chief Complaint   Patient presents with     Hypertension       Initial There were no vitals taken for this visit. Estimated body mass index is 34.67 kg/m  as calculated from the following:    Height as of 1/31/17: 1.626 m (5' 4\").    Weight as of 7/14/17: 91.6 kg (202 lb).    Patient presents to the clinic using No DME    Health Maintenance that is potentially due pending provider review:  PHQ9    Possibly completing today per provider review.    Is there anyone who you would like to be able to receive your results? not asked  If yes have patient fill out DEL    "

## 2019-03-07 NOTE — PROGRESS NOTES
SUBJECTIVE:   Aliza Orr is a 38 year old female who presents to clinic today for the following health issues:      Elevated BP and headache      Duration: 1 week  Elevated BP    125-152/    Description (location/character/radiation): had a dull headache so father suggested checking BP and its been elevated    Intensity:  mild    Accompanying signs and symptoms: headache-temple pounds    History (similar episodes/previous evaluation): None    Precipitating or alleviating factors: both parents have HTN    Therapies tried and outcome: decreased sodium intake, this week and eating more healthy   No OTC analgesics has helped head  Wt Readings from Last 4 Encounters:   03/07/19 98.4 kg (217 lb)   07/14/17 91.6 kg (202 lb)   01/31/17 92.5 kg (204 lb)   01/13/17 93.9 kg (207 lb)         Problem list and histories reviewed & adjusted, as indicated.  Additional history: as documented    Patient Active Problem List   Diagnosis     Anal fissure     LUCAS (obstructive sleep apnea)     Insomnia     Adult BMI 32.0-32.9 kg/sq m     CARDIOVASCULAR SCREENING; LDL GOAL LESS THAN 160     Dyspareunia     Incontinence of urine     SHUKRI 3     CIS (carcinoma in situ of cervix)     Female pelvic pain     S/P LEEP     PMDD (premenstrual dysphoric disorder)     Menorrhagia     Major depression     Persistent insomnia     Benign essential hypertension     Past Surgical History:   Procedure Laterality Date     LAPAROSCOPIC TUBAL LIGATION  4/20/2011    Procedure:LAPAROSCOPIC TUBAL LIGATION; Laparoscopic Tubal Sterlization; Surgeon:RYANNE HAWK; Location:[facility]     LEEP TX, CERVICAL  1/31/12    SHUKRI 1,2,3     SOFT TISSUE SURGERY  1997    Ankle surgery     SURGICAL HISTORY OF -   1997    ankle right     TONSILLECTOMY  2003       Social History     Tobacco Use     Smoking status: Former Smoker     Packs/day: 0.50     Years: 15.00     Pack years: 7.50     Types: Cigarettes     Last attempt to quit: 1/1/2008     Years since quitting:  11.1     Smokeless tobacco: Never Used   Substance Use Topics     Alcohol use: Yes     Comment: Few beers a week     Family History   Problem Relation Age of Onset     Cancer Maternal Grandmother         pancreatic     Other Cancer Maternal Grandmother         Pancreatic     Cancer Paternal Grandfather         liver,lung     Alcohol/Drug Paternal Grandfather      Other Cancer Paternal Grandfather         Liver, Lung, Lymph Node     Gastrointestinal Disease Father         ulcer     Alcohol/Drug Father      Diabetes Father      Hypertension Father      Hyperlipidemia Father      Depression Mother      Hypertension Mother      Hyperlipidemia Mother      Depression/Anxiety Mother      Heart Disease Maternal Grandfather      Alcohol/Drug Paternal Grandmother      Respiratory Daughter      Alcohol/Drug Brother      Cerebrovascular Disease Brother          Current Outpatient Medications   Medication Sig Dispense Refill     temazepam (RESTORIL) 30 MG capsule Take 1 capsule (30 mg) by mouth nightly as needed for sleep 30 capsule 2     Allergies   Allergen Reactions     Nkda [No Known Drug Allergies]      Recent Labs   Lab Test 09/02/14  1557 07/28/12  0925 01/16/12  1634   LDL  --   --  75   HDL  --   --  84   TRIG  --   --  78   CR  --  0.63  --    GFRESTIMATED  --  >90  --    GFRESTBLACK  --  >90  --    POTASSIUM  --  3.4  --    TSH 1.83  --  2.27      BP Readings from Last 3 Encounters:   03/07/19 (!) 142/96   07/14/17 102/62   01/13/17 112/88    Wt Readings from Last 3 Encounters:   03/07/19 98.4 kg (217 lb)   07/14/17 91.6 kg (202 lb)   01/31/17 92.5 kg (204 lb)                  Labs reviewed in EPIC    Reviewed and updated as needed this visit by clinical staff  Allergies  Meds  Problems  Med Hx  Surg Hx       Reviewed and updated as needed this visit by Provider         ROS:  Constitutional, HEENT, cardiovascular, pulmonary, GI, , musculoskeletal, neuro, skin, endocrine and psych systems are negative, except as  "otherwise noted.    OBJECTIVE:     BP (!) 142/96   Pulse 89   Temp 98.3  F (36.8  C)   Resp 18   Ht 1.638 m (5' 4.5\")   Wt 98.4 kg (217 lb)   SpO2 97%   BMI 36.67 kg/m    Body mass index is 36.67 kg/m .  GENERAL: alert and no distress  EYES: Eyes grossly normal to inspection, PERRL and conjunctivae and sclerae normal  HENT: normal cephalic/atraumatic, nose and mouth without ulcers or lesions, oropharynx clear and oral mucous membranes moist  NECK: no adenopathy, no asymmetry, masses, or scars and thyroid normal to palpation  RESP: lungs clear to auscultation - no rales, rhonchi or wheezes  CV: regular rate and rhythm, normal S1 S2, no S3 or S4, no murmur, click or rub, no peripheral edema and peripheral pulses strong  ABDOMEN: soft, nontender, no hepatosplenomegaly, no masses and bowel sounds normal  MS: no gross musculoskeletal defects noted, no edema  SKIN: no suspicious lesions or rashes  NEURO: Normal strength and tone, mentation intact and speech normal  PSYCH: mentation appears normal, affect normal/bright    ASSESSMENT/PLAN:       1. Benign essential hypertension  -Blood pressure readings above target goal of less than 140/90, family history of hypertension, BMI >35.  Lisinopril prescribed, common side effects discussed  - lisinopril (PRINIVIL/ZESTRIL) 10 MG tablet; Take 1 tablet (10 mg) by mouth daily  Dispense: 90 tablet; Refill: 1  - CBC with platelets  - Basic metabolic panel  - TSH    2. Morbid obesity (H)  - Healthy lifestyle modifications stressed including regular exercise, balanced diet, weight loss and limiting salt/caffeine/pop intake.        Patient Instructions       Patient Education     Low-Salt Choices  Eating salt (sodium) can make your body retain too much water. Excess water makes your heart work harder. Canned, packaged, and frozen foods are easy to prepare. But they are often high in sodium. Here are some ideas for low-salt foods you can easily make yourself.    For " breakfast    Fruit or 100% fruit juice    Whole-wheat bread or an English muffin. Look for sodium content on Nutrition Facts labels.    Low-fat milk or yogurt    Unsalted eggs    Shredded wheat    Corn tortillas    Unsalted steamed rice    Regular (not instant) hot cereal, made without salt  Stay away from:    Sausage, ryan, and ham    Flour tortillas    Packaged muffins, pancakes, and biscuits    Instant hot cereals    Cottage cheese  For lunch and dinner    Fresh fish, chicken, turkey, or meat--baked, broiled, or roasted without salt    Dry beans, cooked without salt    Tofu, stir-fried without salt    Unsalted fresh fruit and vegetables, or frozen or canned fruit and vegetables with no added salt  Stay away from:    Lunch or deli meat that is cured or smoked    Cheese    Tomato juice and ketchup    Canned vegetables, soups, and fish not labeled as no-salt-added or reduced sodium    Packaged gravies and sauces    Olives, pickles, and relish    Bottled salad dressings  For snacks and desserts    Yogurt    Unsalted, air-popped popcorn    Unsalted nuts or seeds  Stay away from:    Pies and cakes    Packaged dessert mixes    Pizza    Canned and packaged puddings    Pretzels, chips, crackers, and nuts--unless the label says unsalted  Date Last Reviewed: 6/1/2017 2000-2018 [employer]. [address] [place] [address]. All rights reserved. This information is not intended as a substitute for professional medical care. Always follow your healthcare professional's instructions.           Patient Education     Controlling High Blood Pressure  High blood pressure (hypertension) is often called the silent killer. This is because many people who have it don t know it. High blood pressure can raise your risk of heart attack, stroke, and heart failure. Controlling your blood pressure can decrease your risk of these problems. Know your blood pressure and remember to check it regularly. Doing so can  save your life.  Blood pressure measurements are given as 2 numbers. Systolic blood pressure is the upper number. This is the pressure when the heart contracts. Diastolic blood pressure is the lower number. This is the pressure when the heart relaxes between beats.  Blood pressure is categorized as normal, elevated, or stage 1 or stage 2 high blood pressure:    Normal blood pressure is systolic of less than 120 and diastolic of less than 80 (120/80)    Elevated blood pressure is systolic of 120 to 129 and diastolic less than 80    Stage 1 high blood pressure is systolic is 130 to 139 or diastolic between 80 to 89    Stage 2 high blood pressure is when systolic is 140 or higher or the diastolic is 90 or higher  Here are some things you can do to help control your blood pressure.    Choose heart-healthy foods    Select low-salt, low-fat foods. Limit sodium intake to 2,400 mg per day or the amount suggested by your healthcare provider.    Limit canned, dried, cured, packaged, and fast foods. These can contain a lot of salt.    Eat 8 to 10 servings of fruits and vegetables every day.    Choose lean meats, fish, or chicken.    Eat whole-grain pasta, brown rice, and beans.    Eat 2 to 3 servings of low-fat or fat-free dairy products.    Ask your doctor about the DASH eating plan. This plan helps reduce blood pressure.    When you go to a restaurant, ask that your meal be prepared with no added salt.  Maintain a healthy weight    Ask your healthcare provider how many calories to eat a day. Then stick to that number.    Ask your healthcare provider what weight range is healthiest for you. If you are overweight, a weight loss of only 3% to 5% of your body weight can help lower blood pressure. Generally, a good weight loss goal is to lose 10% of your body weight in a year.    Limit snacks and sweets.    Get regular exercise.  Get up and get active    Choose activities you enjoy. Find ones you can do with friends or family.  This includes bicycling, dancing, walking, and jogging.    Park farther away from building entrances.    Use stairs instead of the elevator.    When you can, walk or bike instead of driving.    [place] leaves, garden, or do household repairs.    Be active at a moderate to vigorous level of physical activity for at least 40 minutes for a minimum of 3 to 4 days a week.   Manage stress    Make time to relax and enjoy life. Find time to laugh.    Communicate your concerns with your loved ones and your healthcare provider.    Visit with family and friends, and keep up with hobbies.  Limit alcohol and quit smoking    Men should have no more than 2 drinks per day.    Women should have no more than 1 drink per day.    Talk with your healthcare provider about quitting smoking. Smoking significantly increases your risk for heart disease and stroke. Ask your healthcare provider about community smoking cessation programs and other options.  Medicines  If lifestyle changes aren t enough, your healthcare provider may prescribe high blood pressure medicine. Take all medicines as prescribed. If you have any questions about your medicines, ask your healthcare provider before stopping or changing them.   Date Last Reviewed: 4/27/2016 2000-2018 [employer]. [address] [place] [address]. All rights reserved. This information is not intended as a substitute for professional medical care. Always follow your healthcare professional's instructions.           Patient Education     Losing Weight for Heart Health  Excess weight is a major risk factor for heart disease. Losing weight has many benefits including lowering your blood pressure, improving your cholesterol level, and decreasing your risk for diseases such as diabetes and heart disease. It may help keep your arteries open so that your heart can get the oxygen-rich blood it needs. All in all, losing weight makes you healthier.     Exercise with a friend.  When activity is fun, you're more likely to stick with it.   Calories and weight loss    Calories are the fuel your body burns for energy. You get the calories you need from the food you eat. For healthy weight loss, women should eat at least 1,200 calories a day, men at least 1,500.    When you eat more calories than you need, your body stores the extra calories as fat. One pound of fat equals 3,500 calories.    To lose weight, try to reduce your total calorie intake by 500 calories. To do this, eat 250 calories less each day. Add activity to burn the other 250 calories. Walking 2.5 miles burns about 250 calories. Other more intense activities can burn more calories in the time you spend doing them, such as swimming and running. It is important to understand that reducing calorie intake is much more effective at weight loss than is exercise.    Eat a variety of healthy foods to get the nutrients you need.  Tips for losing weight    Drink 8 to 10 glasses of water a day.    Don t skip meals. Instead, eat smaller portions.    Eat your meals earlier in the day.    Cut out sugary drinks such as soda and fruit juices.    Make your later meals lighter than your earlier meals.   Brisk activity is best  Brisk activity gets your heart pumping faster and it makes it healthier. It s also a great way to burn calories. In fact, your body may keep burning calories for hours after you stop a brisk activity:    Begin by walking 10 minutes most days.    Add more time and speed to your walk. Build up as you feel able.    Aim for 3 to 4 sessions of aerobic exercise a week. Each session should last about 40 minutes and include moderate to vigorous physical activity.    The most important part of the activity is that you break a sweat. This indicates your heart is working hard enough to burn fat.  Date Last Reviewed: 6/1/2016 2000-2018 [employer]. [address] [facility], [place] [address]. All rights reserved. This  information is not intended as a substitute for professional medical care. Always follow your healthcare professional's instructions.               Rudy Galicia MD  [facility]

## 2019-03-07 NOTE — PATIENT INSTRUCTIONS
Patient Education     Low-Salt Choices  Eating salt (sodium) can make your body retain too much water. Excess water makes your heart work harder. Canned, packaged, and frozen foods are easy to prepare. But they are often high in sodium. Here are some ideas for low-salt foods you can easily make yourself.    For breakfast    Fruit or 100% fruit juice    Whole-wheat bread or an English muffin. Look for sodium content on Nutrition Facts labels.    Low-fat milk or yogurt    Unsalted eggs    Shredded wheat    Corn tortillas    Unsalted steamed rice    Regular (not instant) hot cereal, made without salt  Stay away from:    Sausage, ryan, and ham    Flour tortillas    Packaged muffins, pancakes, and biscuits    Instant hot cereals    Cottage cheese  For lunch and dinner    Fresh fish, chicken, turkey, or meat baked, broiled, or roasted without salt    Dry beans, cooked without salt    Tofu, stir-fried without salt    Unsalted fresh fruit and vegetables, or frozen or canned fruit and vegetables with no added salt  Stay away from:    Lunch or deli meat that is cured or smoked    Cheese    Tomato juice and ketchup    Canned vegetables, soups, and fish not labeled as no-salt-added or reduced sodium    Packaged gravies and sauces    Olives, pickles, and relish    Bottled salad dressings  For snacks and desserts    Yogurt    Unsalted, air-popped popcorn    Unsalted nuts or seeds  Stay away from:    Pies and cakes    Packaged dessert mixes    Pizza    Canned and packaged puddings    Pretzels, chips, crackers, and nuts unless the label says unsalted  Date Last Reviewed: 6/1/2017 2000-2018 [employer]. [address] [place] [id number]. All rights reserved. This information is not intended as a substitute for professional medical care. Always follow your healthcare professional's instructions.           Patient Education     Controlling High Blood Pressure  High blood pressure (hypertension) is often  called the silent killer. This is because many people who have it don t know it. High blood pressure can raise your risk of heart attack, stroke, and heart failure. Controlling your blood pressure can decrease your risk of these problems. Know your blood pressure and remember to check it regularly. Doing so can save your life.  Blood pressure measurements are given as 2 numbers. Systolic blood pressure is the upper number. This is the pressure when the heart contracts. Diastolic blood pressure is the lower number. This is the pressure when the heart relaxes between beats.  Blood pressure is categorized as normal, elevated, or stage 1 or stage 2 high blood pressure:    Normal blood pressure is systolic of less than 120 and diastolic of less than 80 (120/80)    Elevated blood pressure is systolic of 120 to 129 and diastolic less than 80    Stage 1 high blood pressure is systolic is 130 to 139 or diastolic between 80 to 89    Stage 2 high blood pressure is when systolic is 140 or higher or the diastolic is 90 or higher  Here are some things you can do to help control your blood pressure.    Choose heart-healthy foods    Select low-salt, low-fat foods. Limit sodium intake to 2,400 mg per day or the amount suggested by your healthcare provider.    Limit canned, dried, cured, packaged, and fast foods. These can contain a lot of salt.    Eat 8 to 10 servings of fruits and vegetables every day.    Choose lean meats, fish, or chicken.    Eat whole-grain pasta, brown rice, and beans.    Eat 2 to 3 servings of low-fat or fat-free dairy products.    Ask your doctor about the DASH eating plan. This plan helps reduce blood pressure.    When you go to a restaurant, ask that your meal be prepared with no added salt.  Maintain a healthy weight    Ask your healthcare provider how many calories to eat a day. Then stick to that number.    Ask your healthcare provider what weight range is healthiest for you. If you are overweight, a  weight loss of only 3% to 5% of your body weight can help lower blood pressure. Generally, a good weight loss goal is to lose 10% of your body weight in a year.    Limit snacks and sweets.    Get regular exercise.  Get up and get active    Choose activities you enjoy. Find ones you can do with friends or family. This includes bicycling, dancing, walking, and jogging.    Park farther away from building entrances.    Use stairs instead of the elevator.    When you can, walk or bike instead of driving.    Argyle leaves, garden, or do household repairs.    Be active at a moderate to vigorous level of physical activity for at least 40 minutes for a minimum of 3 to 4 days a week.   Manage stress    Make time to relax and enjoy life. Find time to laugh.    Communicate your concerns with your loved ones and your healthcare provider.    Visit with family and friends, and keep up with hobbies.  Limit alcohol and quit smoking    Men should have no more than 2 drinks per day.    Women should have no more than 1 drink per day.    Talk with your healthcare provider about quitting smoking. Smoking significantly increases your risk for heart disease and stroke. Ask your healthcare provider about community smoking cessation programs and other options.  Medicines  If lifestyle changes aren t enough, your healthcare provider may prescribe high blood pressure medicine. Take all medicines as prescribed. If you have any questions about your medicines, ask your healthcare provider before stopping or changing them.   Date Last Reviewed: 4/27/2016 2000-2018 [employer]. [address] [place] [address], [place] [address]. All rights reserved. This information is not intended as a substitute for professional medical care. Always follow your healthcare professional's instructions.           Patient Education     Losing Weight for Heart Health  Excess weight is a major risk factor for heart disease. Losing weight has many benefits  including lowering your blood pressure, improving your cholesterol level, and decreasing your risk for diseases such as diabetes and heart disease. It may help keep your arteries open so that your heart can get the oxygen-rich blood it needs. All in all, losing weight makes you healthier.     Exercise with a friend. When activity is fun, you're more likely to stick with it.   Calories and weight loss    Calories are the fuel your body burns for energy. You get the calories you need from the food you eat. For healthy weight loss, women should eat at least 1,200 calories a day, men at least 1,500.    When you eat more calories than you need, your body stores the extra calories as fat. One pound of fat equals 3,500 calories.    To lose weight, try to reduce your total calorie intake by 500 calories. To do this, eat 250 calories less each day. Add activity to burn the other 250 calories. Walking 2.5 miles burns about 250 calories. Other more intense activities can burn more calories in the time you spend doing them, such as swimming and running. It is important to understand that reducing calorie intake is much more effective at weight loss than is exercise.    Eat a variety of healthy foods to get the nutrients you need.  Tips for losing weight    Drink 8 to 10 glasses of water a day.    Don t skip meals. Instead, eat smaller portions.    Eat your meals earlier in the day.    Cut out sugary drinks such as soda and fruit juices.    Make your later meals lighter than your earlier meals.   Brisk activity is best  Brisk activity gets your heart pumping faster and it makes it healthier. It s also a great way to burn calories. In fact, your body may keep burning calories for hours after you stop a brisk activity:    Begin by walking 10 minutes most days.    Add more time and speed to your walk. Build up as you feel able.    Aim for 3 to 4 sessions of aerobic exercise a week. Each session should last about 40 minutes and  include moderate to vigorous physical activity.    The most important part of the activity is that you break a sweat. This indicates your heart is working hard enough to burn fat.  Date Last Reviewed: 6/1/2016 2000-2018 [employer]. [address] [place] [address], [place] [address]. All rights reserved. This information is not intended as a substitute for professional medical care. Always follow your healthcare professional's instructions.

## 2019-03-08 ASSESSMENT — ANXIETY QUESTIONNAIRES: GAD7 TOTAL SCORE: 0

## 2019-04-02 ENCOUNTER — OFFICE VISIT (OUTPATIENT)
Dept: FAMILY MEDICINE | Facility: CLINIC | Age: 39
End: 2019-04-02
Payer: COMMERCIAL

## 2019-04-02 VITALS
BODY MASS INDEX: 35.93 KG/M2 | TEMPERATURE: 98.5 F | SYSTOLIC BLOOD PRESSURE: 128 MMHG | RESPIRATION RATE: 18 BRPM | HEART RATE: 84 BPM | DIASTOLIC BLOOD PRESSURE: 70 MMHG | WEIGHT: 212.6 LBS

## 2019-04-02 DIAGNOSIS — Z00.00 ROUTINE GENERAL MEDICAL EXAMINATION AT A HEALTH CARE FACILITY: Primary | ICD-10-CM

## 2019-04-02 DIAGNOSIS — I10 BENIGN ESSENTIAL HYPERTENSION: ICD-10-CM

## 2019-04-02 DIAGNOSIS — E66.01 MORBID OBESITY (H): ICD-10-CM

## 2019-04-02 DIAGNOSIS — G47.00 PERSISTENT INSOMNIA: ICD-10-CM

## 2019-04-02 DIAGNOSIS — Z13.220 LIPID SCREENING: ICD-10-CM

## 2019-04-02 LAB
ALBUMIN SERPL-MCNC: 3.8 G/DL (ref 3.4–5)
ALP SERPL-CCNC: 69 U/L (ref 40–150)
ALT SERPL W P-5'-P-CCNC: 48 U/L (ref 0–50)
ANION GAP SERPL CALCULATED.3IONS-SCNC: 4 MMOL/L (ref 3–14)
AST SERPL W P-5'-P-CCNC: 31 U/L (ref 0–45)
BILIRUB SERPL-MCNC: 0.2 MG/DL (ref 0.2–1.3)
BUN SERPL-MCNC: 12 MG/DL (ref 7–30)
CALCIUM SERPL-MCNC: 8.9 MG/DL (ref 8.5–10.1)
CHLORIDE SERPL-SCNC: 102 MMOL/L (ref 94–109)
CHOLEST SERPL-MCNC: 184 MG/DL
CO2 SERPL-SCNC: 28 MMOL/L (ref 20–32)
CREAT SERPL-MCNC: 0.81 MG/DL (ref 0.52–1.04)
GFR SERPL CREATININE-BSD FRML MDRD: >90 ML/MIN/{1.73_M2}
GLUCOSE SERPL-MCNC: 88 MG/DL (ref 70–99)
HDLC SERPL-MCNC: 73 MG/DL
LDLC SERPL CALC-MCNC: 88 MG/DL
NONHDLC SERPL-MCNC: 111 MG/DL
POTASSIUM SERPL-SCNC: 4.2 MMOL/L (ref 3.4–5.3)
PROT SERPL-MCNC: 7.8 G/DL (ref 6.8–8.8)
SODIUM SERPL-SCNC: 134 MMOL/L (ref 133–144)
TRIGL SERPL-MCNC: 116 MG/DL

## 2019-04-02 PROCEDURE — 80053 COMPREHEN METABOLIC PANEL: CPT | Performed by: FAMILY MEDICINE

## 2019-04-02 PROCEDURE — 99395 PREV VISIT EST AGE 18-39: CPT | Performed by: FAMILY MEDICINE

## 2019-04-02 PROCEDURE — 36415 COLL VENOUS BLD VENIPUNCTURE: CPT | Performed by: FAMILY MEDICINE

## 2019-04-02 PROCEDURE — 80061 LIPID PANEL: CPT | Performed by: FAMILY MEDICINE

## 2019-04-02 RX ORDER — TEMAZEPAM 30 MG
30 CAPSULE ORAL
Qty: 30 CAPSULE | Refills: 2 | Status: SHIPPED | OUTPATIENT
Start: 2019-04-02 | End: 2019-07-26

## 2019-04-02 ASSESSMENT — ENCOUNTER SYMPTOMS
MYALGIAS: 0
CONSTIPATION: 0
JOINT SWELLING: 0
ARTHRALGIAS: 0
HEMATOCHEZIA: 0
WEAKNESS: 0
NERVOUS/ANXIOUS: 0
FEVER: 0
EYE PAIN: 0
CHILLS: 0
PARESTHESIAS: 0
HEADACHES: 0
HEMATURIA: 0
DIARRHEA: 0
NAUSEA: 0
ABDOMINAL PAIN: 0
SORE THROAT: 0
DYSURIA: 0
SHORTNESS OF BREATH: 0
FREQUENCY: 0
BREAST MASS: 0
HEARTBURN: 0
PALPITATIONS: 0

## 2019-04-02 NOTE — PROGRESS NOTES
SUBJECTIVE:   CC: Aliza Orr is an 38 year old woman who presents for preventive health visit.     Physical   Annual:     Getting at least 3 servings of Calcium per day:  NO    Bi-annual eye exam:  NO    Dental care twice a year:  Yes    Sleep apnea or symptoms of sleep apnea:  None    Diet:  Low salt    Frequency of exercise:  2-3 days/week    Duration of exercise:  30-45 minutes    Taking medications regularly:  No    Barriers to taking medications:  None    Additional concerns today:  No    PHQ-2 Total Score: 0      hypertension-had some headaches, checked her blood pressure 150s, saw Dr Galicia. He put her on lisinopril and she feels much better.   Has been cutting down on her food portions and has lost weight.     BP Readings from Last 6 Encounters:   19 128/70   19 (!) 142/96   17 102/62   17 112/88   08/20/15 110/62   06/09/15 138/78      Wt Readings from Last 5 Encounters:   19 96.4 kg (212 lb 9.6 oz)   19 98.4 kg (217 lb)   17 91.6 kg (202 lb)   17 92.5 kg (204 lb)   17 93.9 kg (207 lb)        Today's PHQ-2 Score:   PHQ-2 (  [employer]) 2019   Q1: Little interest or pleasure in doing things 0   Q2: Feeling down, depressed or hopeless 0   PHQ-2 Score 0   Q1: Little interest or pleasure in doing things Not at all   Q2: Feeling down, depressed or hopeless Not at all   PHQ-2 Score 0       Abuse: Current or Past(Physical, Sexual or Emotional)- No  Do you feel safe in your environment? Yes    Social History     Tobacco Use     Smoking status: Former Smoker     Packs/day: 0.50     Years: 15.00     Pack years: 7.50     Types: Cigarettes     Last attempt to quit: 2008     Years since quittin.2     Smokeless tobacco: Never Used   Substance Use Topics     Alcohol use: Yes     Comment: Few beers a week     Alcohol Use 2019   If you drink alcohol do you typically have greater than 3 drinks per day OR greater than 7 drinks per week? No      Recent Labs   Lab Test 12  1634   CHOL 175   HDL 84   LDL 75   TRIG 78   CHOLHDLRATIO 2.0          Reviewed orders with patient.  Reviewed health maintenance and updated orders accordingly - Yes  BP Readings from Last 3 Encounters:   19 128/70   19 (!) 142/96   17 102/62    Wt Readings from Last 3 Encounters:   19 96.4 kg (212 lb 9.6 oz)   19 98.4 kg (217 lb)   17 91.6 kg (202 lb)                    Mammogram not appropriate for this patient based on age.    Pertinent mammograms are reviewed under the imaging tab.  History of abnormal Pap smear:   PAP / HPV Latest Ref Rng & Units 2017   PAP - NIL NIL NIL   HPV 16 DNA NEG Negative Negative -   HPV 18 DNA NEG Negative Negative -   OTHER HR HPV NEG Negative Negative -     Reviewed and updated as needed this visit by clinical staff  Tobacco  Allergies  Meds  Problems  Med Hx  Surg Hx  Fam Hx  Soc Hx          Reviewed and updated as needed this visit by Provider  Tobacco  Allergies  Meds  Problems  Med Hx  Surg Hx  Fam Hx        Past Medical History:   Diagnosis Date     Benign essential hypertension 3/7/2019     Genital herpes      HSIL (high grade squamous intraepithelial lesion) on Pap smear 12 colp - SHUKRI 2, 3, CIS     PMDD (premenstrual dysphoric disorder) 2014      Past Surgical History:   Procedure Laterality Date     LAPAROSCOPIC TUBAL LIGATION  2011    Procedure:LAPAROSCOPIC TUBAL LIGATION; Laparoscopic Tubal Sterlization; Surgeon:RYANNE HAWK; Location:[facility] OR     LEEP TX, CERVICAL  12    SHUKRI 1,2,3     SOFT TISSUE SURGERY      Ankle surgery     SURGICAL HISTORY OF -       ankle right     TONSILLECTOMY       Obstetric History       T2      L2     SAB0   TAB0   Ectopic0   Multiple0   Live Births2       # Outcome Date GA Lbr Yazan/2nd Weight Sex Delivery Anes PTL Lv   2 Term 11/09/10 39w0d 04:57 / 00:12 3.685 kg (8 lb 2 oz) M  Vag-Spont Spinal N JHON      Name: CESAR MENENDEZ      Apgar1:  7                Apgar5: 8   1 Term 06/30/08 39w1d 05:29 3.487 kg (7 lb 11 oz) M Vag-Vacuum EPI N JHON      Name: Quentin      Apgar1:  8                Apgar5: 8          Review of Systems   Constitutional: Negative for chills and fever.   HENT: Negative for congestion, ear pain, hearing loss and sore throat.    Eyes: Negative for pain and visual disturbance.   Respiratory: Negative for shortness of breath.    Cardiovascular: Negative for chest pain, palpitations and peripheral edema.   Gastrointestinal: Negative for abdominal pain, constipation, diarrhea, heartburn, hematochezia and nausea.   Breasts:  Negative for tenderness, breast mass and discharge.   Genitourinary: Negative for dysuria, frequency, genital sores, hematuria, pelvic pain, urgency, vaginal bleeding and vaginal discharge.   Musculoskeletal: Negative for arthralgias, joint swelling and myalgias.   Skin: Negative for rash.   Neurological: Negative for weakness, headaches and paresthesias.   Psychiatric/Behavioral: Negative for mood changes. The patient is not nervous/anxious.         OBJECTIVE:   /70 (BP Location: Right arm, Patient Position: Chair, Cuff Size: Adult Large)   Pulse 84   Temp 98.5  F (36.9  C) (Tympanic)   Resp 18   Wt 96.4 kg (212 lb 9.6 oz)   BMI 35.93 kg/m    Physical Exam  GENERAL: healthy, alert and no distress  EYES: Eyes grossly normal to inspection, PERRL and conjunctivae and sclerae normal  HENT: ear canals and TM's normal, nose and mouth without ulcers or lesions  NECK: no adenopathy, no asymmetry, masses, or scars and thyroid normal to palpation  RESP: lungs clear to auscultation - no rales, rhonchi or wheezes  BREAST: normal without masses, tenderness or nipple discharge and no palpable axillary masses or adenopathy  CV: regular rate and rhythm, normal S1 S2, no S3 or S4, no murmur, click or rub, no peripheral edema and peripheral pulses  "strong  ABDOMEN: soft, nontender, no hepatosplenomegaly, no masses and bowel sounds normal  MS: no gross musculoskeletal defects noted, no edema  SKIN: no suspicious lesions or rashes  NEURO: Normal strength and tone, mentation intact and speech normal  PSYCH: mentation appears normal, affect normal/bright    ASSESSMENT/PLAN:   Aliza was seen today for physical.    Diagnoses and all orders for this visit:    Routine general medical examination at a health care facility    Benign essential hypertension  -     Comprehensive metabolic panel  -     Lipid panel reflex to direct LDL Non-fasting    Lipid screening  -     Lipid panel reflex to direct LDL Non-fasting    Persistent insomnia  -     temazepam (RESTORIL) 30 MG capsule; Take 1 capsule (30 mg) by mouth nightly as needed for sleep    Morbid obesity (H)      I realized that she has not had an EKG, and at some point would get a baseline     COUNSELING:  Reviewed preventive health counseling, as reflected in patient instructions    BP Readings from Last 1 Encounters:   04/02/19 128/70     Estimated body mass index is 35.93 kg/m  as calculated from the following:    Height as of 3/7/19: 1.638 m (5' 4.5\").    Weight as of this encounter: 96.4 kg (212 lb 9.6 oz).      Weight management plan: Discussed healthy diet and exercise guidelines     reports that she quit smoking about 11 years ago. Her smoking use included cigarettes. She has a 7.50 pack-year smoking history. she has never used smokeless tobacco.      Counseling Resources:  ATP IV Guidelines  Pooled Cohorts Equation Calculator  Breast Cancer Risk Calculator  FRAX Risk Assessment  ICSI Preventive Guidelines  Dietary Guidelines for Americans, 2010  USDA's MyPlate  ASA Prophylaxis  Lung CA Screening    Barb Tidwell MD  [facility]  "

## 2019-04-02 NOTE — NURSING NOTE
"Chief Complaint   Patient presents with     Physical       Initial /70 (BP Location: Right arm, Patient Position: Chair, Cuff Size: Adult Large)   Pulse 84   Temp 98.5  F (36.9  C) (Tympanic)   Resp 18   Wt 96.4 kg (212 lb 9.6 oz)   BMI 35.93 kg/m   Estimated body mass index is 35.93 kg/m  as calculated from the following:    Height as of 3/7/19: 1.638 m (5' 4.5\").    Weight as of this encounter: 96.4 kg (212 lb 9.6 oz).    Patient presents to the clinic using No DME    Health Maintenance that is potentially due pending provider review:  NONE    n/a    Is there anyone who you would like to be able to receive your results? No  If yes have patient fill out DEL    Shreya Barahona M.A.      "

## 2019-05-15 ENCOUNTER — MYC MEDICAL ADVICE (OUTPATIENT)
Dept: FAMILY MEDICINE | Facility: CLINIC | Age: 39
End: 2019-05-15

## 2019-05-20 ENCOUNTER — OFFICE VISIT (OUTPATIENT)
Dept: FAMILY MEDICINE | Facility: CLINIC | Age: 39
End: 2019-05-20
Payer: COMMERCIAL

## 2019-05-20 VITALS
DIASTOLIC BLOOD PRESSURE: 82 MMHG | RESPIRATION RATE: 12 BRPM | HEART RATE: 71 BPM | SYSTOLIC BLOOD PRESSURE: 104 MMHG | BODY MASS INDEX: 36.67 KG/M2 | WEIGHT: 217 LBS | TEMPERATURE: 98.1 F | OXYGEN SATURATION: 97 %

## 2019-05-20 DIAGNOSIS — M25.50 MULTIPLE JOINT PAIN: Primary | ICD-10-CM

## 2019-05-20 DIAGNOSIS — R53.83 FATIGUE, UNSPECIFIED TYPE: ICD-10-CM

## 2019-05-20 LAB
ALBUMIN SERPL-MCNC: 3.8 G/DL (ref 3.4–5)
ALP SERPL-CCNC: 69 U/L (ref 40–150)
ALT SERPL W P-5'-P-CCNC: 36 U/L (ref 0–50)
ANION GAP SERPL CALCULATED.3IONS-SCNC: 3 MMOL/L (ref 3–14)
AST SERPL W P-5'-P-CCNC: 17 U/L (ref 0–45)
BILIRUB SERPL-MCNC: 0.4 MG/DL (ref 0.2–1.3)
BUN SERPL-MCNC: 11 MG/DL (ref 7–30)
CALCIUM SERPL-MCNC: 8.9 MG/DL (ref 8.5–10.1)
CHLORIDE SERPL-SCNC: 104 MMOL/L (ref 94–109)
CO2 SERPL-SCNC: 31 MMOL/L (ref 20–32)
CREAT SERPL-MCNC: 0.74 MG/DL (ref 0.52–1.04)
CRP SERPL-MCNC: 6.1 MG/L (ref 0–8)
ERYTHROCYTE [SEDIMENTATION RATE] IN BLOOD BY WESTERGREN METHOD: 12 MM/H (ref 0–20)
GFR SERPL CREATININE-BSD FRML MDRD: >90 ML/MIN/{1.73_M2}
GLUCOSE SERPL-MCNC: 88 MG/DL (ref 70–99)
POTASSIUM SERPL-SCNC: 4.1 MMOL/L (ref 3.4–5.3)
PROT SERPL-MCNC: 7.4 G/DL (ref 6.8–8.8)
SODIUM SERPL-SCNC: 138 MMOL/L (ref 133–144)

## 2019-05-20 PROCEDURE — 99214 OFFICE O/P EST MOD 30 MIN: CPT | Performed by: FAMILY MEDICINE

## 2019-05-20 PROCEDURE — 86618 LYME DISEASE ANTIBODY: CPT | Performed by: FAMILY MEDICINE

## 2019-05-20 PROCEDURE — 36415 COLL VENOUS BLD VENIPUNCTURE: CPT | Performed by: FAMILY MEDICINE

## 2019-05-20 PROCEDURE — 85652 RBC SED RATE AUTOMATED: CPT | Performed by: FAMILY MEDICINE

## 2019-05-20 PROCEDURE — 86038 ANTINUCLEAR ANTIBODIES: CPT | Performed by: FAMILY MEDICINE

## 2019-05-20 PROCEDURE — 86140 C-REACTIVE PROTEIN: CPT | Performed by: FAMILY MEDICINE

## 2019-05-20 PROCEDURE — 86431 RHEUMATOID FACTOR QUANT: CPT | Performed by: FAMILY MEDICINE

## 2019-05-20 PROCEDURE — 80053 COMPREHEN METABOLIC PANEL: CPT | Performed by: FAMILY MEDICINE

## 2019-05-20 ASSESSMENT — PAIN SCALES - GENERAL: PAINLEVEL: MILD PAIN (2)

## 2019-05-20 NOTE — NURSING NOTE
"Chief Complaint   Patient presents with     Musculoskeletal Problem     joint pain in hands, fatigue, loosing strength        Initial /82   Pulse 71   Temp 98.1  F (36.7  C) (Tympanic)   Resp 12   Wt 98.4 kg (217 lb)   LMP 05/06/2019   SpO2 97%   BMI 36.67 kg/m   Estimated body mass index is 36.67 kg/m  as calculated from the following:    Height as of 3/7/19: 1.638 m (5' 4.5\").    Weight as of this encounter: 98.4 kg (217 lb).    Patient presents to the clinic using No DME    Health Maintenance that is potentially due pending provider review:  HPV    n/a    Is there anyone who you would like to be able to receive your results? No  If yes have patient fill out DEL      "

## 2019-05-20 NOTE — PROGRESS NOTES
Subjective     Aliza Orr is a 38 year old female who presents to clinic today for the following health issues:    HPI   Musculoskeletal problem/pain      Duration: 3 weeks    Description  Location: bilateral hand joint pain    Intensity:  moderate    Accompanying signs and symptoms: swelling in fingers, also has issues with feet, mornings are worse, better after up and moving, can't get wedding ring on, also very fatigued - very abnormal for her    History  Previous similar problem: no   Previous evaluation:  none    Precipitating or alleviating factors:  Trauma or overuse: no   Aggravating factors include: overuse and gripping - loosing strength    Therapies tried and outcome: ibuprofen if it's really bad   Has tracked her BP /70-84  Has family History of RA - fathers side of family, dad has deformed fingers - hasn't gotten tested      3 weeks of pain in MCPs of lower thumbs, fingers swelling, feels that she doesn't have strength in the hands. Has to use two hands for grabbing things. Feet hurt, entire foot and her toes. After an hour being up.   Mornings are the worst, and after 10 min feels better.   Feels it is in the joints. Hands can throb.   Has FH rheumatoid arthritis. Grandma had lupus.  Feels fatigued more than normal.   TSH   Date Value Ref Range Status   03/07/2019 1.86 0.40 - 4.00 mU/L Final        BP Readings from Last 3 Encounters:   05/20/19 104/82   04/02/19 128/70   03/07/19 (!) 142/96    Wt Readings from Last 3 Encounters:   05/20/19 98.4 kg (217 lb)   04/02/19 96.4 kg (212 lb 9.6 oz)   03/07/19 98.4 kg (217 lb)            Reviewed and updated as needed this visit by Provider  Tobacco  Allergies  Meds  Problems  Med Hx  Surg Hx  Fam Hx         Review of Systems   ROS COMP: Constitutional, HEENT, cardiovascular, pulmonary, gi and gu systems are negative, except as otherwise noted.      Objective    /82   Pulse 71   Temp 98.1  F (36.7  C) (Tympanic)   Resp 12   Wt 98.4  kg (217 lb)   LMP 05/06/2019   SpO2 97%   BMI 36.67 kg/m    Body mass index is 36.67 kg/m .  Physical Exam   GENERAL: healthy, alert and no distress  NECK: no adenopathy, no asymmetry, masses, or scars and thyroid normal to palpation  RESP: lungs clear to auscultation - no rales, rhonchi or wheezes  CV: regular rate and rhythm, normal S1 S2, no S3 or S4, no murmur  MS: trace edema in fingers, no synovitis       ROS: 5 point ROS negative except as noted above in HPI, including Gen., Resp., CV, GI &  system review.     ASSESSMENT:  1. Multiple joint pain    2. Fatigue, unspecified type        PLAN:  Orders Placed This Encounter     Rheumatoid factor     ESR: Erythrocyte sedimentation rate     CRP, inflammation     Anti Nuclear Amy IgG by IFA with Reflex     Lyme Disease Amy with reflex to WB Serum     Comprehensive metabolic panel       Patient Instructions   Labs today    Consider xray       Barb Smith MD

## 2019-05-21 LAB
ANA SER QL IF: NEGATIVE
B BURGDOR IGG+IGM SER QL: 0.02 (ref 0–0.89)
RHEUMATOID FACT SER NEPH-ACNC: <20 IU/ML (ref 0–20)

## 2019-07-01 ENCOUNTER — TELEPHONE (OUTPATIENT)
Dept: RHEUMATOLOGY | Facility: CLINIC | Age: 39
End: 2019-07-01

## 2019-07-01 NOTE — TELEPHONE ENCOUNTER
[facility] Call Center    Phone Message    May a detailed message be left on voicemail: yes    Reason for Call: Other: Patient is being referred by her [facility] PCP, Dr. Barb Tidwell, to be seen for muliple joint pain. Patient stated she is already scheduled at another [facility] location but was hoping to come to the [place]. Please call patient.     Action Taken: Message routed to:  [facility] ([facility] Rheumatology

## 2019-07-08 NOTE — TELEPHONE ENCOUNTER
7/8/2019  3:52 PM          RN Care Coordinator Encounter   Patient is scheduled for a telephonic intake process call with Uyen Clark CMA on 7/24/19 at 1:45 pm. Patient is aware that if the appointment is missed she will have to reschedule OR if patient needs to reschedule, she may contact the clinic directly at [phone]. Patient verbalized understanding and has no further questions or concerns at this time. Message routed to Uyen SALINAS to review for the intake process.           Tessa Deshpande RN, BSN, PHN  [facility]  RN Care Coordinator Medicine Specialty Pool Nurse   (Nephrology, Rheumatology, Infectious Disease & Hepatology)

## 2019-07-23 NOTE — TELEPHONE ENCOUNTER
[facility] Call Center    Phone Message    May a detailed message be left on voicemail: yes    Reason for Call: Other: Pt called in and stated that she needed to cancel the phone interview. Pt stated there is no need to follow up for reschedule. Thank you     Action Taken: Message routed to:  [facility] Rheum.

## 2019-07-26 DIAGNOSIS — G47.00 PERSISTENT INSOMNIA: ICD-10-CM

## 2019-07-26 RX ORDER — TEMAZEPAM 30 MG
30 CAPSULE ORAL
Qty: 30 CAPSULE | Refills: 2 | Status: SHIPPED | OUTPATIENT
Start: 2019-07-26 | End: 2019-10-20

## 2019-07-26 NOTE — TELEPHONE ENCOUNTER
Routing refill request to provider for review/approval because:  Drug not on the [facility] refill protocol     Dianne BERUMEN RN

## 2019-08-23 ENCOUNTER — TELEPHONE (OUTPATIENT)
Dept: SCHEDULING | Facility: CLINIC | Age: 39
End: 2019-08-23

## 2019-08-23 NOTE — TELEPHONE ENCOUNTER
8/23/2019    Attempt 1    Contacted patient in regards to scheduling [facility] mammogram, [place] for 9/23.    Message on voicemail     Patient is also due for -     Comments:       Outreach   VANIA

## 2019-09-23 ENCOUNTER — ANCILLARY PROCEDURE (OUTPATIENT)
Dept: MAMMOGRAPHY | Facility: CLINIC | Age: 39
End: 2019-09-23
Payer: COMMERCIAL

## 2019-09-23 DIAGNOSIS — Z12.31 VISIT FOR SCREENING MAMMOGRAM: ICD-10-CM

## 2019-09-23 PROCEDURE — 77063 BREAST TOMOSYNTHESIS BI: CPT

## 2019-09-23 PROCEDURE — 77067 SCR MAMMO BI INCL CAD: CPT | Mod: TC

## 2019-10-20 ENCOUNTER — MYC REFILL (OUTPATIENT)
Dept: FAMILY MEDICINE | Facility: CLINIC | Age: 39
End: 2019-10-20

## 2019-10-20 DIAGNOSIS — G47.00 PERSISTENT INSOMNIA: ICD-10-CM

## 2019-10-21 RX ORDER — TEMAZEPAM 30 MG
30 CAPSULE ORAL
Qty: 30 CAPSULE | Refills: 2 | Status: SHIPPED | OUTPATIENT
Start: 2019-10-21 | End: 2020-01-19

## 2019-10-23 ENCOUNTER — MYC REFILL (OUTPATIENT)
Dept: FAMILY MEDICINE | Facility: CLINIC | Age: 39
End: 2019-10-23

## 2019-10-23 DIAGNOSIS — I10 BENIGN ESSENTIAL HYPERTENSION: ICD-10-CM

## 2019-10-23 RX ORDER — VALSARTAN 80 MG/1
80 TABLET ORAL DAILY
Qty: 30 TABLET | Refills: 1 | Status: SHIPPED | OUTPATIENT
Start: 2019-10-23 | End: 2019-12-23

## 2019-11-01 ENCOUNTER — ANCILLARY PROCEDURE (OUTPATIENT)
Dept: GENERAL RADIOLOGY | Facility: CLINIC | Age: 39
End: 2019-11-01
Attending: NURSE PRACTITIONER
Payer: COMMERCIAL

## 2019-11-01 ENCOUNTER — OFFICE VISIT (OUTPATIENT)
Dept: FAMILY MEDICINE | Facility: CLINIC | Age: 39
End: 2019-11-01
Payer: COMMERCIAL

## 2019-11-01 VITALS
OXYGEN SATURATION: 97 % | HEART RATE: 70 BPM | TEMPERATURE: 97.3 F | BODY MASS INDEX: 37.18 KG/M2 | DIASTOLIC BLOOD PRESSURE: 72 MMHG | WEIGHT: 220 LBS | SYSTOLIC BLOOD PRESSURE: 132 MMHG

## 2019-11-01 DIAGNOSIS — M19.122 POST-TRAUMATIC OSTEOARTHRITIS OF LEFT ELBOW: ICD-10-CM

## 2019-11-01 DIAGNOSIS — M19.122 POST-TRAUMATIC OSTEOARTHRITIS OF LEFT ELBOW: Primary | ICD-10-CM

## 2019-11-01 PROCEDURE — 99214 OFFICE O/P EST MOD 30 MIN: CPT | Performed by: NURSE PRACTITIONER

## 2019-11-01 PROCEDURE — 73070 X-RAY EXAM OF ELBOW: CPT | Mod: LT

## 2019-11-01 ASSESSMENT — PATIENT HEALTH QUESTIONNAIRE - PHQ9
SUM OF ALL RESPONSES TO PHQ QUESTIONS 1-9: 0
10. IF YOU CHECKED OFF ANY PROBLEMS, HOW DIFFICULT HAVE THESE PROBLEMS MADE IT FOR YOU TO DO YOUR WORK, TAKE CARE OF THINGS AT HOME, OR GET ALONG WITH OTHER PEOPLE: NOT DIFFICULT AT ALL
SUM OF ALL RESPONSES TO PHQ QUESTIONS 1-9: 0

## 2019-11-01 ASSESSMENT — PAIN SCALES - GENERAL: PAINLEVEL: NO PAIN (0)

## 2019-11-01 NOTE — PROGRESS NOTES
Subjective     Aliza Orr is a 39 year old female who presents to clinic today for the following health issues:    HPI   ELBOW PAIN       Duration: 2 months     Description (location/character/radiation): left elbow injury     Door slammed into it     Intensity:  moderate    Accompanying signs and symptoms: pain     History (similar episodes/previous evaluation): broken elbow about 15 years ago     Precipitating or alleviating factors: None    Therapies tried and outcome: None       -------------------------------------    Patient Active Problem List   Diagnosis     Anal fissure     LUCAS (obstructive sleep apnea)     Insomnia     Adult BMI 32.0-32.9 kg/sq m     CARDIOVASCULAR SCREENING; LDL GOAL LESS THAN 160     Dyspareunia     Incontinence of urine     SHUKRI 3     CIS (carcinoma in situ of cervix)     Female pelvic pain     S/P LEEP     PMDD (premenstrual dysphoric disorder)     Menorrhagia     Major depression     Persistent insomnia     Benign essential hypertension     Obesity (BMI 35.0-39.9) with comorbidity (H)     Past Surgical History:   Procedure Laterality Date     LAPAROSCOPIC TUBAL LIGATION  2011    Procedure:LAPAROSCOPIC TUBAL LIGATION; Laparoscopic Tubal Sterlization; Surgeon:RYANNE HAWK; Location:[facility]     LEEP TX, CERVICAL  12    SHUKRI 1,2,3     SOFT TISSUE SURGERY      Ankle surgery     SURGICAL HISTORY OF -       ankle right     TONSILLECTOMY         Social History     Tobacco Use     Smoking status: Former Smoker     Packs/day: 0.50     Years: 15.00     Pack years: 7.50     Types: Cigarettes     Last attempt to quit: 2008     Years since quittin.8     Smokeless tobacco: Never Used   Substance Use Topics     Alcohol use: Yes     Comment: Few beers a week     Family History   Problem Relation Age of Onset     Cancer Maternal Grandmother         pancreatic     Other Cancer Maternal Grandmother         Pancreatic     Cancer Paternal Grandfather          liver,lung     Alcohol/Drug Paternal Grandfather      Other Cancer Paternal Grandfather         Liver, Lung, Lymph Node     Gastrointestinal Disease Father         ulcer     Alcohol/Drug Father      Diabetes Father      Hypertension Father      Hyperlipidemia Father      Depression Mother      Hypertension Mother      Hyperlipidemia Mother      Depression/Anxiety Mother      Heart Disease Maternal Grandfather      Alcohol/Drug Paternal Grandmother      Respiratory Daughter      Alcohol/Drug Brother      Cerebrovascular Disease Brother      Substance Abuse Brother            -------------------------------------  Reviewed and updated as needed this visit by Provider         Review of Systems   ROS COMP: Constitutional, HEENT, cardiovascular, pulmonary, GI, , musculoskeletal, neuro, skin, endocrine and psych systems are negative, except as otherwise noted.      Objective    /72   Pulse 70   Temp 97.3  F (36.3  C) (Tympanic)   Wt 99.8 kg (220 lb)   LMP 09/18/2019   SpO2 97%   BMI 37.18 kg/m    Body mass index is 37.18 kg/m .  Physical Exam   GENERAL: healthy, alert and no distress  EYES: Eyes grossly normal to inspection, PERRL and conjunctivae and sclerae normal  HENT: ear canals and TM's normal, nose and mouth without ulcers or lesions  NECK: no adenopathy, no asymmetry, masses, or scars and thyroid normal to palpation  RESP: lungs clear to auscultation - no rales, rhonchi or wheezes  CV: regular rate and rhythm, normal S1 S2, no S3 or S4, no murmur, click or rub, no peripheral edema and peripheral pulses strong  ABDOMEN: soft, nontender, no hepatosplenomegaly, no masses and bowel sounds normal  MS: decreased range of motion intermittent left elbow, peripheral pulses normal and tenderness to palpation left elbow  SKIN: no suspicious lesions or rashes  NEURO: Normal strength and tone, mentation intact and speech normal  PSYCH: mentation appears normal, affect normal/bright    Diagnostic Test  Results:  Labs reviewed in Epic  Recent Results (from the past 744 hour(s))   XR Elbow Left 2 Views    Narrative    LEFT ELBOW TWO VIEWS   11/1/2019 9:58 AM     HISTORY: Post-traumatic osteoarthritis of left elbow.    COMPARISON: None.      Impression    IMPRESSION: No joint effusion. Joint spaces are well-preserved. No  acute fracture or subluxation.    ALEX BULLOCK MD             Assessment & Plan     Aliza was seen today for elbow pain.    Diagnoses and all orders for this visit:    Post-traumatic osteoarthritis of left elbow  -     XR Elbow Left 2 Views; Future    Rest, ice, brace for comfort  Tylenol up to 3500 mg daily  Avoid NSAIDs due to hypertension  Topical Aspercreme  MRI with ongoing symptoms      Call or return to the clinic with any worsening of symptoms or no resolution. Patient/Parent verbalized understanding and is in agreement. Medication side effects reviewed.   Current Outpatient Medications   Medication Sig Dispense Refill     temazepam (RESTORIL) 30 MG capsule Take 1 capsule (30 mg) by mouth nightly as needed for sleep 30 capsule 2     valsartan (DIOVAN) 80 MG tablet Take 1 tablet (80 mg) by mouth daily 30 tablet 1     Chart documentation with Dragon Voice recognition Software. Although reviewed after completion, some words and grammatical errors may remain.    See Patient Instructions    Return in about 1 month (around 12/1/2019) for Ongoing elbow pain as needed.    GENO Atkins [facility]      MRI and ORTHO CONSULT IF ONGOING PAIN. SHE WILL MYCHART IF SHE WOULD LIKE TO PERSUE FURTHER        Answers for HPI/ROS submitted by the patient on 11/1/2019   If you checked off any problems, how difficult have these problems made it for you to do your work, take care of things at home, or get along with other people?: Not difficult at all  PHQ9 TOTAL SCORE: 0

## 2019-11-01 NOTE — LETTER
My Depression Action Plan  Name: Aliza Orr   Date of Birth 1980  Date: 11/1/2019    My doctor: Barb Mejia   My clinic: [address] [id number] [phone]          GREEN    ZONE   Good Control    What it looks like:     Things are going generally well. You have normal up s and down s. You may even feel depressed from time to time, but bad moods usually last less than a day.   What you need to do:  1. Continue to care for yourself (see self care plan)  2. Check your depression survival kit and update it as needed  3. Follow your physician s recommendations including any medication.  4. Do not stop taking medication unless you consult with your physician first.           YELLOW         ZONE Getting Worse    What it looks like:     Depression is starting to interfere with your life.     It may be hard to get out of bed; you may be starting to isolate yourself from others.    Symptoms of depression are starting to last most all day and this has happened for several days.     You may have suicidal thoughts but they are not constant.   What you need to do:     1. Call your care team, your response to treatment will improve if you keep your care team informed of your progress. Yellow periods are signs an adjustment may need to be made.     2. Continue your self-care, even if you have to fake it!    3. Talk to someone in your support network    4. Open up your depression survival kit           RED    ZONE Medical Alert - Get Help    What it looks like:     Depression is seriously interfering with your life.     You may experience these or other symptoms: You can t get out of bed most days, can t work or engage in other necessary activities, you have trouble taking care of basic hygiene, or basic responsibilities, thoughts of suicide or death that will not go away, self-injurious behavior.     What you need to do:  1. Call your care team and  request a same-day appointment. If they are not available (weekends or after hours) call your local crisis line, emergency room or 911.            Depression Self Care Plan / Survival Kit    Self-Care for Depression  Here s the deal. Your body and mind are really not as separate as most people think.  What you do and think affects how you feel and how you feel influences what you do and think. This means if you do things that people who feel good do, it will help you feel better.  Sometimes this is all it takes.  There is also a place for medication and therapy depending on how severe your depression is, so be sure to consult with your medical provider and/ or Behavioral Health Consultant if your symptoms are worsening or not improving.     In order to better manage my stress, I will:    Exercise  Get some form of exercise, every day. This will help reduce pain and release endorphins, the  feel good  chemicals in your brain. This is almost as good as taking antidepressants!  This is not the same as joining a gym and then never going! (they count on that by the way ) It can be as simple as just going for a walk or doing some gardening, anything that will get you moving.      Hygiene   Maintain good hygiene (Get out of bed in the morning, Make your bed, Brush your teeth, Take a shower, and Get dressed like you were going to work, even if you are unemployed).  If your clothes don't fit try to get ones that do.    Diet  I will strive to eat foods that are good for me, drink plenty of water, and avoid excessive sugar, caffeine, alcohol, and other mood-altering substances.  Some foods that are helpful in depression are: complex carbohydrates, B vitamins, flaxseed, fish or fish oil, fresh fruits and vegetables.    Psychotherapy  I agree to participate in Individual Therapy (if recommended).    Medication  If prescribed medications, I agree to take them.  Missing doses can result in serious side effects.  I understand that  drinking alcohol, or other illicit drug use, may cause potential side effects.  I will not stop my medication abruptly without first discussing it with my provider.    Staying Connected With Others  I will stay in touch with my friends, family members, and my primary care provider/team.    Use your imagination  Be creative.  We all have a creative side; it doesn t matter if it s oil painting, sand castles, or mud pies! This will also kick up the endorphins.    Witness Beauty  (AKA stop and smell the roses) Take a look outside, even in mid-winter. Notice colors, textures. Watch the squirrels and birds.     Service to others  Be of service to others.  There is always someone else in need.  By helping others we can  get out of ourselves  and remember the really important things.  This also provides opportunities for practicing all the other parts of the program.    Humor  Laugh and be silly!  Adjust your TV habits for less news and crime-drama and more comedy.    Control your stress  Try breathing deep, massage therapy, biofeedback, and meditation. Find time to relax each day.     My support system    Clinic Contact:  Phone number:    Contact 1:  Phone number:    Contact 2:  Phone number:    Orthodoxy/:  Phone number:    Therapist:  Phone number:    Local crisis center:    Phone number:    Other community support:  Phone number:

## 2019-11-01 NOTE — PATIENT INSTRUCTIONS
Patient Education     Treatment for Bone Bruise (Bone Contusion)  A bone bruise is an injury to a bone that is less severe than a bone fracture. Bone bruises are fairly common. They can happen to people of all ages. Any type of bone in your body can get a bone bruise. Other injuries often happen along with a bone bruise, such as damage to nearby ligaments.  Types of treatment  Treatment for a bone bruise may include:    Resting the bone or joint    Putting an ice pack on the area several times a day    Raising the injury above the level of your heart to reduce swelling    Taking medicine to reduce pain and swelling    Wearing a brace or other device to limit movement  Your healthcare provider may give you advice about your diet. This is because eating a diet that is rich in calcium, vitamin D, and protein can help you heal. Your healthcare provider may ask you not to use certain over-the-counter medicines for pain. Some of these may delay normal bone healing. If you smoke, your healthcare provider will advise you to stop smoking. Smoking can also delay bone healing.  Your healthcare provider will tell you how long you should not put weight on your bone. Most bone bruises slowly heal over 2 to 4 months. A larger bone bruise may take longer to heal. You may not be able to return to sports activities for weeks or months. If your symptoms don t go away, your healthcare provider may order an MRI.  Possible complications of a bone bruise  Most bone bruises heal without any problems. If your bone bruise is very large, your body may have trouble getting blood flow back to the area. This can cause avascular necrosis of the bone. This leads to death of that part of the bone.  When to call the healthcare provider  Call your healthcare provider if your symptoms don t start to get better in a few days. Call him or her right away if you have any severe symptoms, such as a high fever.  Date Last Reviewed: 5/1/2018 2000-2018  [employer]. [address] [place] [address]. All rights reserved. This information is not intended as a substitute for professional medical care. Always follow your healthcare professional's instructions.           Patient Education     Understanding Bone Bruise (Bone Contusion)  A bone bruise is an injury to a bone that is less severe than a bone fracture. Bone bruises are fairly common. They can happen to people of all ages. Any type of bone in your body can be bruised. Other injuries often happen along with a bone bruise, such as damage to nearby ligaments.  What happens when a bone is bruised?  Bone is made of different kinds of tissue. The periosteum is a thin layer of tissue that covers most of a bone. Where bones come together, there is usually a layer of cartilage at the edges. The bone here is called subchondral bone. Deep inside the bone is an area called the medulla. It contains the bone marrow and fibrous tissue called trabeculae.  With a bone fracture, all of the trabeculae in a region of bone have broken. But with a bone bruise, an injury only damages some of these trabeculae. An injury might cause blood to build up in the area beneath the periosteum. This causes a subperiosteal hematoma, a type of bone bruise. An injury might also cause bleeding and swelling in the area between your cartilage and the bone beneath it. This causes a subchondral bone bruise. Or bleeding and swelling can occur in the medulla of your bone. This is called an intraosseous bone bruise.  What causes a bone bruise?  Injury of any kind can cause a bone bruise. Sports injuries, motor vehicle accidents, or falls from a height can cause them. Twisting injuries that cause joint sprains can also cause a bone bruise. Health conditions like arthritis may also lead to a bone bruise. This is because arthritis causes bone surfaces to grind against each other. Child abuse is another cause of bone bruises.  Symptoms  of a bone bruise  Symptoms of a bone bruise can include:    Pain and soreness in the injured area    Swelling in the area and soft tissues around it    Change in color of the injured area    Swelling or stiffness of an injured joint  This pain is often more severe and lasts longer than a soft tissue injury. How severe your symptoms are and how long they last depends on how severe the bone bruise is.  Diagnosing a bone bruise  Your healthcare provider will ask you about your medical history and symptoms. He or she will ask how you got your injury. Your provider will examine the injured area to check for pain, bruising, and swelling. After the exam, your health care provider may be able to tell if you have a bone bruise.  A bone bruise doesn t show up on an X-ray. But you may be given an X-ray to rule out a bone fracture. A fracture may need a different kind of treatment. An MRI can confirm a bone bruise. But your healthcare provider will likely only give you an MRI if your symptoms don t get better.  Date Last Reviewed: 4/1/2017 2000-2018 [employer]. [address] [place] [address], [place] [address]. All rights reserved. This information is not intended as a substitute for professional medical care. Always follow your healthcare professional's instructions.

## 2019-11-03 ENCOUNTER — HEALTH MAINTENANCE LETTER (OUTPATIENT)
Age: 39
End: 2019-11-03

## 2019-12-22 DIAGNOSIS — I10 BENIGN ESSENTIAL HYPERTENSION: ICD-10-CM

## 2019-12-23 RX ORDER — VALSARTAN 80 MG/1
TABLET ORAL
Qty: 30 TABLET | Refills: 1 | Status: SHIPPED | OUTPATIENT
Start: 2019-12-23 | End: 2020-02-17

## 2019-12-23 NOTE — TELEPHONE ENCOUNTER
"Requested Prescriptions   Pending Prescriptions Disp Refills     valsartan (DIOVAN) 80 MG tablet [Pharmacy Med Name: VALSARTAN 80MG TABS] 30 tablet 1     Sig: TAKE ONE TABLET BY MOUTH ONCE DAILY       Angiotensin-II Receptors Passed - 12/22/2019 11:03 PM        Passed - Last blood pressure under 140/90 in past 12 months     BP Readings from Last 3 Encounters:   11/01/19 132/72   05/20/19 104/82   04/02/19 128/70                 Passed - Recent (12 mo) or future (30 days) visit within the authorizing provider's specialty     Patient has had an office visit with the authorizing provider or a provider within the authorizing providers department within the previous 12 mos or has a future within next 30 days. See \"Patient Info\" tab in inbasket, or \"Choose Columns\" in Meds & Orders section of the refill encounter.              Passed - Medication is active on med list        Passed - Patient is age 18 or older        Passed - No active pregnancy on record        Passed - Normal serum creatinine on file in past 12 months     Recent Labs   Lab Test 05/20/19  1019   CR 0.74             Passed - Normal serum potassium on file in past 12 months     Recent Labs   Lab Test 05/20/19  1019   POTASSIUM 4.1                    Passed - No positive pregnancy test in past 12 months        Last Written Prescription Date:  10/23/19  Last Fill Quantity: 30,  # refills: 1   Last office visit: 11/1/2019 with prescribing provider:      Future Office Visit:      "

## 2020-01-19 ENCOUNTER — MYC REFILL (OUTPATIENT)
Dept: FAMILY MEDICINE | Facility: CLINIC | Age: 40
End: 2020-01-19

## 2020-01-19 DIAGNOSIS — G47.00 PERSISTENT INSOMNIA: ICD-10-CM

## 2020-01-20 RX ORDER — TEMAZEPAM 30 MG
30 CAPSULE ORAL
Qty: 30 CAPSULE | Refills: 2 | Status: SHIPPED | OUTPATIENT
Start: 2020-01-20 | End: 2020-04-17

## 2020-01-20 NOTE — TELEPHONE ENCOUNTER
12/24/2019  1   10/21/2019  Temazepam 30 Mg Capsule  30.00 30 Ju [place]  [id number]  [place] ([id number])  2/2 1.50 [place]   11/25/2019  1   10/21/2019  Temazepam 30 Mg Capsule  30.00 30 Ju [place]  [id number]  [place] ([id number])  1/[place] 1.[place]   10/22/2019  1   10/21/2019  Temazepam 30 Mg Capsule  30.00 30 Ju [place]  [id number]  [place] ([id number])  0/2 1.50 [place]     [place] reviewed:  reviewed- no concerns    [place]  [email or web address]

## 2020-02-15 DIAGNOSIS — I10 BENIGN ESSENTIAL HYPERTENSION: ICD-10-CM

## 2020-02-16 NOTE — TELEPHONE ENCOUNTER
"Requested Prescriptions   Pending Prescriptions Disp Refills     valsartan (DIOVAN) 80 MG tablet [Pharmacy Med Name: VALSARTAN 80MG TABS] 30 tablet 1     Sig: TAKE ONE TABLET BY MOUTH ONCE DAILY       Angiotensin-II Receptors Passed - 2/15/2020  5:01 AM        Passed - Last blood pressure under 140/90 in past 12 months     BP Readings from Last 3 Encounters:   11/01/19 132/72   05/20/19 104/82   04/02/19 128/70                 Passed - Recent (12 mo) or future (30 days) visit within the authorizing provider's specialty     Patient has had an office visit with the authorizing provider or a provider within the authorizing providers department within the previous 12 mos or has a future within next 30 days. See \"Patient Info\" tab in inbasket, or \"Choose Columns\" in Meds & Orders section of the refill encounter.              Passed - Medication is active on med list        Passed - Patient is age 18 or older        Passed - No active pregnancy on record        Passed - Normal serum creatinine on file in past 12 months     Recent Labs   Lab Test 05/20/19  1019   CR 0.74             Passed - Normal serum potassium on file in past 12 months     Recent Labs   Lab Test 05/20/19  1019   POTASSIUM 4.1                    Passed - No positive pregnancy test in past 12 months        Last Written Prescription Date:  12/23/19  Last Fill Quantity: 30,  # refills: 1   Last office visit: 11/1/2019 with prescribing provider:     Future Office Visit:      "

## 2020-02-17 RX ORDER — VALSARTAN 80 MG/1
TABLET ORAL
Qty: 30 TABLET | Refills: 2 | Status: SHIPPED | OUTPATIENT
Start: 2020-02-17 | End: 2020-05-13

## 2020-02-17 NOTE — TELEPHONE ENCOUNTER
Prescription approved per [facility] Refill Protocol.    Dianne BERUMEN RN

## 2020-04-16 DIAGNOSIS — G47.00 PERSISTENT INSOMNIA: ICD-10-CM

## 2020-04-16 NOTE — TELEPHONE ENCOUNTER
Requested Prescriptions   Pending Prescriptions Disp Refills     temazepam (RESTORIL) 30 MG capsule [Pharmacy Med Name: TEMAZEPAM 30MG CAPS] 30 capsule 2     Sig: TAKE ONE CAPSULE BY MOUTH NIGHTLY AS NEEDED FOR SLEEP       There is no refill protocol information for this order          Last Written Prescription Date:  1/20/20  Last Fill Quantity: 30,   # refills: 2  Last Office Visit: 11/1/19  Future Office visit:       Routing refill request to provider for review/approval because:  Drug not on the [facility], [facility] or [facility] refill protocol or controlled substance\

## 2020-04-17 RX ORDER — TEMAZEPAM 30 MG
CAPSULE ORAL
Qty: 30 CAPSULE | Refills: 2 | Status: SHIPPED | OUTPATIENT
Start: 2020-04-17 | End: 2020-07-16

## 2020-04-17 NOTE — TELEPHONE ENCOUNTER
Routing refill request to provider for review/approval because:  Drug not on the [facility] refill protocol     Dianne BERUMEN RN, BSN

## 2020-05-13 ENCOUNTER — TELEPHONE (OUTPATIENT)
Dept: FAMILY MEDICINE | Facility: CLINIC | Age: 40
End: 2020-05-13

## 2020-05-13 DIAGNOSIS — I10 BENIGN ESSENTIAL HYPERTENSION: Primary | ICD-10-CM

## 2020-05-13 DIAGNOSIS — I10 BENIGN ESSENTIAL HYPERTENSION: ICD-10-CM

## 2020-05-13 RX ORDER — LOSARTAN POTASSIUM 50 MG/1
50 TABLET ORAL DAILY
Qty: 30 TABLET | Refills: 1 | Status: SHIPPED | OUTPATIENT
Start: 2020-05-13 | End: 2020-07-08

## 2020-05-13 RX ORDER — VALSARTAN 80 MG/1
TABLET ORAL
Qty: 30 TABLET | Refills: 2 | Status: SHIPPED | OUTPATIENT
Start: 2020-05-13 | End: 2020-05-13

## 2020-05-13 NOTE — TELEPHONE ENCOUNTER
The Valsartan is Back ordered until July, possibly, all strengths are not available.  Would you like to switch medication    [facility]

## 2020-05-13 NOTE — TELEPHONE ENCOUNTER
Routing to Dr. Andres to advise in PCP absence, please see below, please advise.    REBECCA Lopez

## 2020-07-08 DIAGNOSIS — I10 BENIGN ESSENTIAL HYPERTENSION: ICD-10-CM

## 2020-07-08 RX ORDER — LOSARTAN POTASSIUM 50 MG/1
TABLET ORAL
Qty: 30 TABLET | Refills: 1 | Status: SHIPPED | OUTPATIENT
Start: 2020-07-08 | End: 2020-09-04

## 2020-07-08 NOTE — TELEPHONE ENCOUNTER
"Requested Prescriptions   Pending Prescriptions Disp Refills     losartan (COZAAR) 50 MG tablet [Pharmacy Med Name: LOSARTAN POTASSIUM 50MG TABS] 30 tablet 1     Sig: TAKE ONE TABLET BY MOUTH ONCE DAILY       Angiotensin-II Receptors Failed - 7/8/2020  5:01 AM        Failed - Normal serum creatinine on file in past 12 months     Recent Labs   Lab Test 05/20/19  1019   CR 0.74       Ok to refill medication if creatinine is low          Failed - Normal serum potassium on file in past 12 months     Recent Labs   Lab Test 05/20/19  1019   POTASSIUM 4.1                    Passed - Last blood pressure under 140/90 in past 12 months     BP Readings from Last 3 Encounters:   11/01/19 132/72   05/20/19 104/82   04/02/19 128/70                 Passed - Recent (12 mo) or future (30 days) visit within the authorizing provider's specialty     Patient has had an office visit with the authorizing provider or a provider within the authorizing providers department within the previous 12 mos or has a future within next 30 days. See \"Patient Info\" tab in inbasket, or \"Choose Columns\" in Meds & Orders section of the refill encounter.              Passed - Medication is active on med list        Passed - Patient is age 18 or older        Passed - No active pregnancy on record        Passed - No positive pregnancy test in past 12 months             "

## 2020-07-16 DIAGNOSIS — G47.00 PERSISTENT INSOMNIA: ICD-10-CM

## 2020-07-16 RX ORDER — TEMAZEPAM 30 MG
CAPSULE ORAL
Qty: 30 CAPSULE | Refills: 2 | Status: SHIPPED | OUTPATIENT
Start: 2020-07-16 | End: 2020-09-15

## 2020-07-16 NOTE — TELEPHONE ENCOUNTER
Routing refill request to provider for review/approval because:  Drug not on the [facility] refill protocol   Due for Physical, PAP      Last Written Prescription Date:  4/17/2020  Last Fill Quantity: 30,  # refills: 2   Last office visit: 11/1/2019 with prescribing provider:  Dr. Mejia    Future Office Visit:        Dianne BERUMEN RN, BSN

## 2020-09-04 DIAGNOSIS — I10 BENIGN ESSENTIAL HYPERTENSION: ICD-10-CM

## 2020-09-04 RX ORDER — LOSARTAN POTASSIUM 50 MG/1
TABLET ORAL
Qty: 90 TABLET | Refills: 0 | Status: SHIPPED | OUTPATIENT
Start: 2020-09-04 | End: 2020-09-15

## 2020-09-04 NOTE — TELEPHONE ENCOUNTER
"Routing refill request to provider for review/approval because:  Labs not current  Requested Prescriptions   Pending Prescriptions Disp Refills     losartan (COZAAR) 50 MG tablet [Pharmacy Med Name: LOSARTAN POTASSIUM 50MG TABS] 30 tablet 1     Sig: TAKE ONE TABLET BY MOUTH ONCE DAILY       Angiotensin-II Receptors Failed - 9/4/2020  5:01 AM        Failed - Normal serum creatinine on file in past 12 months     Recent Labs   Lab Test 05/20/19  1019   CR 0.74       Ok to refill medication if creatinine is low          Failed - Normal serum potassium on file in past 12 months     Recent Labs   Lab Test 05/20/19  1019   POTASSIUM 4.1                    Passed - Last blood pressure under 140/90 in past 12 months     BP Readings from Last 3 Encounters:   11/01/19 132/72   05/20/19 104/82   04/02/19 128/70                 Passed - Recent (12 mo) or future (30 days) visit within the authorizing provider's specialty     Patient has had an office visit with the authorizing provider or a provider within the authorizing providers department within the previous 12 mos or has a future within next 30 days. See \"Patient Info\" tab in inbasket, or \"Choose Columns\" in Meds & Orders section of the refill encounter.              Passed - Medication is active on med list        Passed - Patient is age 18 or older        Passed - No active pregnancy on record        Passed - No positive pregnancy test in past 12 months             "

## 2020-09-14 ASSESSMENT — ENCOUNTER SYMPTOMS
FREQUENCY: 0
ABDOMINAL PAIN: 0
SORE THROAT: 0
HEMATOCHEZIA: 1
FEVER: 0
ARTHRALGIAS: 0
DYSURIA: 0
HEARTBURN: 0
BREAST MASS: 0
SHORTNESS OF BREATH: 0
DIZZINESS: 0
PARESTHESIAS: 0
CHILLS: 0
JOINT SWELLING: 0
CONSTIPATION: 1
COUGH: 0
WEAKNESS: 0
EYE PAIN: 0
PALPITATIONS: 0
NAUSEA: 0
HEADACHES: 0
NERVOUS/ANXIOUS: 0
MYALGIAS: 0
HEMATURIA: 0
DIARRHEA: 0

## 2020-09-15 ENCOUNTER — OFFICE VISIT (OUTPATIENT)
Dept: FAMILY MEDICINE | Facility: CLINIC | Age: 40
End: 2020-09-15
Payer: COMMERCIAL

## 2020-09-15 ENCOUNTER — ANCILLARY PROCEDURE (OUTPATIENT)
Dept: GENERAL RADIOLOGY | Facility: CLINIC | Age: 40
End: 2020-09-15
Attending: FAMILY MEDICINE
Payer: COMMERCIAL

## 2020-09-15 VITALS
TEMPERATURE: 97.9 F | HEIGHT: 65 IN | WEIGHT: 227 LBS | DIASTOLIC BLOOD PRESSURE: 84 MMHG | SYSTOLIC BLOOD PRESSURE: 120 MMHG | HEART RATE: 90 BPM | OXYGEN SATURATION: 99 % | RESPIRATION RATE: 20 BRPM | BODY MASS INDEX: 37.82 KG/M2

## 2020-09-15 DIAGNOSIS — I10 BENIGN ESSENTIAL HYPERTENSION: ICD-10-CM

## 2020-09-15 DIAGNOSIS — R06.02 SOB (SHORTNESS OF BREATH): ICD-10-CM

## 2020-09-15 DIAGNOSIS — M79.672 PAIN OF BOTH HEELS: ICD-10-CM

## 2020-09-15 DIAGNOSIS — R53.83 FATIGUE, UNSPECIFIED TYPE: ICD-10-CM

## 2020-09-15 DIAGNOSIS — Z98.890 S/P LEEP: ICD-10-CM

## 2020-09-15 DIAGNOSIS — F32.1 CURRENT MODERATE EPISODE OF MAJOR DEPRESSIVE DISORDER, UNSPECIFIED WHETHER RECURRENT (H): ICD-10-CM

## 2020-09-15 DIAGNOSIS — G43.101 MIGRAINE WITH AURA AND WITH STATUS MIGRAINOSUS, NOT INTRACTABLE: ICD-10-CM

## 2020-09-15 DIAGNOSIS — K62.5 RECTAL BLEEDING: ICD-10-CM

## 2020-09-15 DIAGNOSIS — Z00.00 ROUTINE GENERAL MEDICAL EXAMINATION AT A HEALTH CARE FACILITY: Primary | ICD-10-CM

## 2020-09-15 DIAGNOSIS — M79.671 PAIN OF BOTH HEELS: ICD-10-CM

## 2020-09-15 DIAGNOSIS — R42 VERTIGO: ICD-10-CM

## 2020-09-15 DIAGNOSIS — G47.00 PERSISTENT INSOMNIA: ICD-10-CM

## 2020-09-15 LAB
ALBUMIN SERPL-MCNC: 3.8 G/DL (ref 3.4–5)
ALP SERPL-CCNC: 86 U/L (ref 40–150)
ALT SERPL W P-5'-P-CCNC: 30 U/L (ref 0–50)
ANION GAP SERPL CALCULATED.3IONS-SCNC: 4 MMOL/L (ref 3–14)
AST SERPL W P-5'-P-CCNC: 22 U/L (ref 0–45)
BASOPHILS # BLD AUTO: 0 10E9/L (ref 0–0.2)
BASOPHILS NFR BLD AUTO: 0.5 %
BILIRUB SERPL-MCNC: 0.2 MG/DL (ref 0.2–1.3)
BUN SERPL-MCNC: 13 MG/DL (ref 7–30)
CALCIUM SERPL-MCNC: 8.9 MG/DL (ref 8.5–10.1)
CHLORIDE SERPL-SCNC: 104 MMOL/L (ref 94–109)
CO2 SERPL-SCNC: 28 MMOL/L (ref 20–32)
CREAT SERPL-MCNC: 0.77 MG/DL (ref 0.52–1.04)
DIFFERENTIAL METHOD BLD: NORMAL
EOSINOPHIL # BLD AUTO: 0.1 10E9/L (ref 0–0.7)
EOSINOPHIL NFR BLD AUTO: 1.8 %
ERYTHROCYTE [DISTWIDTH] IN BLOOD BY AUTOMATED COUNT: 12 % (ref 10–15)
GFR SERPL CREATININE-BSD FRML MDRD: >90 ML/MIN/{1.73_M2}
GLUCOSE SERPL-MCNC: 84 MG/DL (ref 70–99)
HCT VFR BLD AUTO: 39.8 % (ref 35–47)
HGB BLD-MCNC: 13.2 G/DL (ref 11.7–15.7)
LYMPHOCYTES # BLD AUTO: 2.4 10E9/L (ref 0.8–5.3)
LYMPHOCYTES NFR BLD AUTO: 31.1 %
MCH RBC QN AUTO: 29.7 PG (ref 26.5–33)
MCHC RBC AUTO-ENTMCNC: 33.2 G/DL (ref 31.5–36.5)
MCV RBC AUTO: 90 FL (ref 78–100)
MONOCYTES # BLD AUTO: 0.5 10E9/L (ref 0–1.3)
MONOCYTES NFR BLD AUTO: 6.5 %
NEUTROPHILS # BLD AUTO: 4.7 10E9/L (ref 1.6–8.3)
NEUTROPHILS NFR BLD AUTO: 60.1 %
PLATELET # BLD AUTO: 316 10E9/L (ref 150–450)
POTASSIUM SERPL-SCNC: 4.4 MMOL/L (ref 3.4–5.3)
PROT SERPL-MCNC: 7.8 G/DL (ref 6.8–8.8)
RBC # BLD AUTO: 4.44 10E12/L (ref 3.8–5.2)
SODIUM SERPL-SCNC: 136 MMOL/L (ref 133–144)
TSH SERPL DL<=0.005 MIU/L-ACNC: 2.13 MU/L (ref 0.4–4)
WBC # BLD AUTO: 7.8 10E9/L (ref 4–11)

## 2020-09-15 PROCEDURE — 71046 X-RAY EXAM CHEST 2 VIEWS: CPT

## 2020-09-15 PROCEDURE — 87624 HPV HI-RISK TYP POOLED RSLT: CPT | Performed by: FAMILY MEDICINE

## 2020-09-15 PROCEDURE — 80050 GENERAL HEALTH PANEL: CPT | Performed by: FAMILY MEDICINE

## 2020-09-15 PROCEDURE — 36415 COLL VENOUS BLD VENIPUNCTURE: CPT | Performed by: FAMILY MEDICINE

## 2020-09-15 PROCEDURE — 99213 OFFICE O/P EST LOW 20 MIN: CPT | Mod: 25 | Performed by: FAMILY MEDICINE

## 2020-09-15 PROCEDURE — 99396 PREV VISIT EST AGE 40-64: CPT | Performed by: FAMILY MEDICINE

## 2020-09-15 PROCEDURE — G0145 SCR C/V CYTO,THINLAYER,RESCR: HCPCS | Performed by: FAMILY MEDICINE

## 2020-09-15 PROCEDURE — 93000 ELECTROCARDIOGRAM COMPLETE: CPT | Performed by: FAMILY MEDICINE

## 2020-09-15 RX ORDER — TEMAZEPAM 30 MG
CAPSULE ORAL
Qty: 30 CAPSULE | Refills: 2 | Status: SHIPPED | OUTPATIENT
Start: 2020-10-13 | End: 2021-01-18

## 2020-09-15 RX ORDER — LOSARTAN POTASSIUM 50 MG/1
50 TABLET ORAL DAILY
Qty: 90 TABLET | Refills: 3 | Status: SHIPPED | OUTPATIENT
Start: 2020-09-15 | End: 2020-12-15

## 2020-09-15 ASSESSMENT — ENCOUNTER SYMPTOMS
FEVER: 0
CONSTIPATION: 1
ABDOMINAL PAIN: 0
SORE THROAT: 0
HEMATURIA: 0
DYSURIA: 0
BREAST MASS: 0
CHILLS: 0
WEAKNESS: 0
ARTHRALGIAS: 0
EYE PAIN: 0
HEARTBURN: 0
DIARRHEA: 0
HEADACHES: 0
FREQUENCY: 0
COUGH: 0
SHORTNESS OF BREATH: 0
HEMATOCHEZIA: 1
MYALGIAS: 0
PARESTHESIAS: 0
NAUSEA: 0
JOINT SWELLING: 0
NERVOUS/ANXIOUS: 0
DIZZINESS: 0
PALPITATIONS: 0

## 2020-09-15 ASSESSMENT — ANXIETY QUESTIONNAIRES
7. FEELING AFRAID AS IF SOMETHING AWFUL MIGHT HAPPEN: NOT AT ALL
GAD7 TOTAL SCORE: 2
3. WORRYING TOO MUCH ABOUT DIFFERENT THINGS: NOT AT ALL
2. NOT BEING ABLE TO STOP OR CONTROL WORRYING: NOT AT ALL
6. BECOMING EASILY ANNOYED OR IRRITABLE: MORE THAN HALF THE DAYS
5. BEING SO RESTLESS THAT IT IS HARD TO SIT STILL: NOT AT ALL
1. FEELING NERVOUS, ANXIOUS, OR ON EDGE: NOT AT ALL
IF YOU CHECKED OFF ANY PROBLEMS ON THIS QUESTIONNAIRE, HOW DIFFICULT HAVE THESE PROBLEMS MADE IT FOR YOU TO DO YOUR WORK, TAKE CARE OF THINGS AT HOME, OR GET ALONG WITH OTHER PEOPLE: NOT DIFFICULT AT ALL

## 2020-09-15 ASSESSMENT — PATIENT HEALTH QUESTIONNAIRE - PHQ9
5. POOR APPETITE OR OVEREATING: NOT AT ALL
SUM OF ALL RESPONSES TO PHQ QUESTIONS 1-9: 9

## 2020-09-15 ASSESSMENT — MIFFLIN-ST. JEOR: SCORE: 1687.61

## 2020-09-15 NOTE — PATIENT INSTRUCTIONS
Heel cups  stretches  Better shoes  See podiatry    Start sertraline one daily  Consider counseling    See surgeon about the rectal bleeding    Consider stress test, calcium score, or echocardiogram     MRI brain    Recheck one month                    Preventive Health Recommendations  Female Ages 40 to 49    Yearly exam:     See your health care provider every year in order to  1. Review health changes.   2. Discuss preventive care.    3. Review your medicines if your doctor prescribed any.      Get a Pap test every three years (unless you have an abnormal result and your provider advises testing more often).      If you get Pap tests with HPV test, you only need to test every 5 years, unless you have an abnormal result. You do not need a Pap test if your uterus was removed (hysterectomy) and you have not had cancer.      You should be tested each year for STDs (sexually transmitted diseases), if you're at risk.     Ask your doctor if you should have a mammogram.      Have a colonoscopy (test for colon cancer) if someone in your family has had colon cancer or polyps before age 50.       Have a cholesterol test every 5 years.       Have a diabetes test (fasting glucose) after age 45. If you are at risk for diabetes, you should have this test every 3 years.    Shots: Get a flu shot each year. Get a tetanus shot every 10 years.     Nutrition:     Eat at least 5 servings of fruits and vegetables each day.    Eat whole-grain bread, whole-wheat pasta and brown rice instead of white grains and rice.    Get adequate Calcium and Vitamin D.      Lifestyle    Exercise at least 150 minutes a week (an average of 30 minutes a day, 5 days a week). This will help you control your weight and prevent disease.    Limit alcohol to one drink per day.    No smoking.     Wear sunscreen to prevent skin cancer.    See your dentist every six months for an exam and cleaning.

## 2020-09-15 NOTE — NURSING NOTE
"Chief Complaint   Patient presents with     Physical       Initial /84 (BP Location: Right arm)   Pulse 90   Temp 97.9  F (36.6  C) (Tympanic)   Resp 20   Ht 1.638 m (5' 4.5\")   Wt 103 kg (227 lb)   LMP 09/05/2020   SpO2 99%   BMI 38.36 kg/m   Estimated body mass index is 38.36 kg/m  as calculated from the following:    Height as of this encounter: 1.638 m (5' 4.5\").    Weight as of this encounter: 103 kg (227 lb).    Patient presents to the clinic using No DME    Health Maintenance that is potentially due pending provider review:  Pap Smear    Possibly completing today per provider review.    Is there anyone who you would like to be able to receive your results? No  If yes have patient fill out DEL    "

## 2020-09-15 NOTE — PROGRESS NOTES
SUBJECTIVE:   CC: Aliza Orr is an 40 year old woman who presents for preventive health visit.       Patient has been advised of split billing requirements and indicates understanding: Yes  Healthy Habits:     Getting at least 3 servings of Calcium per day:  NO    Bi-annual eye exam:  Yes    Dental care twice a year:  Yes    Sleep apnea or symptoms of sleep apnea:  Daytime drowsiness    Diet:  Regular (no restrictions)    Frequency of exercise:  None    Taking medications regularly:  Yes    Medication side effects:  None    PHQ-2 Total Score: 1    Additional concerns today:  Yes          Depression and Anxiety Follow-Up    How are you doing with your depression since your last visit? Worsened     How are you doing with your anxiety since your last visit?  Worsened     Are you having other symptoms that might be associated with depression or anxiety? No    Have you had a significant life event? Grief or Loss Brother     Do you have any concerns with your use of alcohol or other drugs? No    Social History     Tobacco Use     Smoking status: Former Smoker     Packs/day: 0.50     Years: 15.00     Pack years: 7.50     Types: Cigarettes     Last attempt to quit: 2008     Years since quittin.7     Smokeless tobacco: Never Used   Substance Use Topics     Alcohol use: Yes     Comment: Few beers a week     Drug use: No     PHQ 2017 3/7/2019 2019   PHQ-9 Total Score 1 1 0   Q9: Thoughts of better off dead/self-harm past 2 weeks Not at all Not at all Not at all     ALBIN-7 SCORE 2015 3/7/2019   Total Score 3 0     Last PHQ-9 9/15/2020   1.  Little interest or pleasure in doing things 1   2.  Feeling down, depressed, or hopeless 1   3.  Trouble falling or staying asleep, or sleeping too much 1   4.  Feeling tired or having little energy 3   5.  Poor appetite or overeating 3   6.  Feeling bad about yourself 0   7.  Trouble concentrating 0   8.  Moving slowly or restless 0   Q9: Thoughts of  better off dead/self-harm past 2 weeks 0   PHQ-9 Total Score 9   Difficulty at work, home, or with people Somewhat difficult     ALBIN-7  9/15/2020   1. Feeling nervous, anxious, or on edge 0   2. Not being able to stop or control worrying 0   3. Worrying too much about different things 0   4. Trouble relaxing 0   5. Being so restless that it is hard to sit still 0   6. Becoming easily annoyed or irritable 2   7. Feeling afraid, as if something awful might happen 0   ALBIN-7 Total Score 2   If you checked any problems, how difficult have they made it for you to do your work, take care of things at home, or get along with other people? Not difficult at all       Suicide Assessment Five-step Evaluation and Treatment (SAFE-T)      Today's PHQ-2 Score:   PHQ-2 (  [employer]) 2020   Q1: Little interest or pleasure in doing things 1   Q2: Feeling down, depressed or hopeless 0   PHQ-2 Score 1   Q1: Little interest or pleasure in doing things Several days   Q2: Feeling down, depressed or hopeless Not at all   PHQ-2 Score 1     Lost her brother this summer  Has a lot of grief. Has anhedonia.   Fatigued all the time.     Had 3 spells where she is very dizzy, gets nauseated. One time had an  Aura of scintillating lights,like a kaleidescope on the left side, and then got a headache. Very painful, on left side. May 28. No history of migraine, aunt with migraine. Ibuprofen helped. Had photophobia.    had vertigo, got nauseated. Laid for an hour and it went away, then had a slight headache the rest of the day.    had another bout of vertigo. Felt malaise rest of day.   Nothing since.   Her uncle and great grandma had strokes. Aunt has an aneurysm.     Has shortness of breath with activities, like going up and down stairs. Even taking a shower. No chest pain, nausea or diaphoresis.  Worries because her brother  of heart disease. He was an alcoholic    Abuse: Current or Past (Physical, Sexual or Emotional) -  No  Do you feel safe in your environment? Yes    hypertension   On losartan  BP Readings from Last 6 Encounters:   09/15/20 120/84   19 132/72   19 104/82   19 128/70   19 (!) 142/96   17 102/62       Hemorrhoids?  Really since the birth of Quentin (), had a fissure then  Is on metamucil 3 tsp for keeping he stool soft.   If doesn't take she has bleeding  Any kind of pressure, valsalva, she'll bleed.       Heels still hurt. Very painful. All the time.   Is difficult to walk.   Not like her plantar fasciitis  Back of heel    Recent Labs   Lab Test 19  1538   CHOL 184   HDL 73   LDL 88   TRIG 116          Social History     Tobacco Use     Smoking status: Former Smoker     Packs/day: 0.50     Years: 15.00     Pack years: 7.50     Types: Cigarettes     Last attempt to quit: 2008     Years since quittin.7     Smokeless tobacco: Never Used   Substance Use Topics     Alcohol use: Yes     Comment: Few beers a week         Alcohol Use 2020   Prescreen: >3 drinks/day or >7 drinks/week? No   Prescreen: >3 drinks/day or >7 drinks/week? -       Reviewed orders with patient.  Reviewed health maintenance and updated orders accordingly - Yes  BP Readings from Last 3 Encounters:   09/15/20 120/84   19 132/72   19 104/82    Wt Readings from Last 3 Encounters:   09/15/20 103 kg (227 lb)   19 99.8 kg (220 lb)   19 98.4 kg (217 lb)                    Mammogram Screening: Patient under age 50, mutual decision reflected in health maintenance.      Pertinent mammograms are reviewed under the imaging tab.  History of abnormal Pap smear: YES - SHUKRI 2/3 on biopsy - PAP/HPV co-testing at 12, 24 months.  If two negative results repeat co-testing in 3 years, if negative then routine screening.  PAP / HPV Latest Ref Rng & Units 2017   PAP - NIL NIL NIL   HPV 16 DNA NEG Negative Negative -   HPV 18 DNA NEG Negative Negative -   OTHER HR HPV NEG  Negative Negative -     Reviewed and updated as needed this visit by clinical staff  Tobacco  Allergies  Meds         Reviewed and updated as needed this visit by Provider        Past Medical History:   Diagnosis Date     Benign essential hypertension 3/7/2019     Genital herpes      HSIL (high grade squamous intraepithelial lesion) on Pap smear 12 colp - SHUKRI 2, 3, CIS     PMDD (premenstrual dysphoric disorder) 2014      Past Surgical History:   Procedure Laterality Date     LAPAROSCOPIC TUBAL LIGATION  2011    Procedure:LAPAROSCOPIC TUBAL LIGATION; Laparoscopic Tubal Sterlization; Surgeon:RYANNE HAWK; Location:[facility]     LEEP TX, CERVICAL  12    SHUKRI 1,2,3     SOFT TISSUE SURGERY      Ankle surgery     SURGICAL HISTORY OF -       ankle right     TONSILLECTOMY       OB History    Para Term  AB Living   2 2 2 0 0 2   SAB TAB Ectopic Multiple Live Births   0 0 0 0 2      # Outcome Date GA Lbr Yazan/2nd Weight Sex Delivery Anes PTL Lv   2 Term 11/09/10 39w0d 04:57 / 00:12 3.685 kg (8 lb 2 oz) M Vag-Spont Spinal N JHON      Name: CESAR MENENDEZ      Apgar1: 7  Apgar5: 8   1 Term 08 39w1d 05:29 3.487 kg (7 lb 11 oz) M Vag-Vacuum EPI N JHON      Birth Comments: -Vacuum.      Name: Quentin      Apgar1: 8  Apgar5: 8       Review of Systems   Constitutional: Negative for chills and fever.   HENT: Negative for congestion, ear pain, hearing loss and sore throat.    Eyes: Negative for pain and visual disturbance.   Respiratory: Negative for cough and shortness of breath.    Cardiovascular: Negative for chest pain, palpitations and peripheral edema.   Gastrointestinal: Positive for constipation and hematochezia. Negative for abdominal pain, diarrhea, heartburn and nausea.   Breasts:  Negative for tenderness, breast mass and discharge.   Genitourinary: Negative for dysuria, frequency, genital sores, hematuria, pelvic pain, urgency, vaginal bleeding and  "vaginal discharge.   Musculoskeletal: Negative for arthralgias, joint swelling and myalgias.   Skin: Negative for rash.   Neurological: Negative for dizziness, weakness, headaches and paresthesias.   Psychiatric/Behavioral: Positive for mood changes. The patient is not nervous/anxious.         OBJECTIVE:   /84 (BP Location: Right arm)   Pulse 90   Temp 97.9  F (36.6  C) (Tympanic)   Resp 20   Ht 1.638 m (5' 4.5\")   Wt 103 kg (227 lb)   LMP 09/05/2020   SpO2 99%   BMI 38.36 kg/m    Physical Exam  GENERAL: healthy, alert and no distress  EYES: Eyes grossly normal to inspection, PERRL and conjunctivae and sclerae normal  HENT: ear canals and TM's normal, nose and mouth without ulcers or lesions  NECK: no adenopathy, no asymmetry, masses, or scars and thyroid normal to palpation  RESP: lungs clear to auscultation - no rales, rhonchi or wheezes  BREAST: normal without masses, tenderness or nipple discharge and no palpable axillary masses or adenopathy  CV: regular rate and rhythm, normal S1 S2, no S3 or S4, no murmur, click or rub, no peripheral edema and peripheral pulses strong  ABDOMEN: soft, nontender, no hepatosplenomegaly, no masses and bowel sounds normal   (female): normal female external genitalia, normal urethral meatus, vaginal mucosa pink, moist, well rugated, and normal cervix/adnexa/uterus without masses or discharge  RECTAL:  no rectal masses, has a minimal skin tag, old scar from fissure at 6:00  MS: bilateral heel pain at posterior base of heel, no pain along fascia insertion  SKIN: no suspicious lesions or rashes  NEURO: Normal strength and tone, mentation intact and speech normal  PSYCH: Psych: Mood:euthymic,  Affect: labile, Speech:normal, Thought Processes: normal, Suicidal Ideation: No     chest x-ray read by myself and is normal  EKG normal sinus rhythm     ASSESSMENT/PLAN:   1. Routine general medical examination at a health care facility    - Pap imaged thin layer screen with HPV " "- recommended age 30 - 65 years (select HPV order below)  - HPV High Risk Types DNA Cervical    2. Persistent insomnia  stable  - temazepam (RESTORIL) 30 MG capsule; TAKE ONE CAPSULE BY MOUTH NIGHTLY AS NEEDED FOR SLEEP  Dispense: 30 capsule; Refill: 2    3. Benign essential hypertension  Well controlled  - losartan (COZAAR) 50 MG tablet; Take 1 tablet (50 mg) by mouth daily  Dispense: 90 tablet; Refill: 3  - Comprehensive metabolic panel (BMP + Alb, Alk Phos, ALT, AST, Total. Bili, TP)  - CBC with platelets and differential    4. Pain of both heels  Suspect base of achilles  - Orthopedic & Spine  Referral; Future  See below    5. Fatigue, unspecified type  Uncertain etiology    6. Rectal bleeding  Suspect internal hemorrhoids  - GENERAL SURG ADULT REFERRAL; Future    7. SOB (shortness of breath)  Uncertain etiology  - CBC with platelets and differential  - TSH with free T4 reflex  - EKG 12-lead complete w/read - Clinics  - XR Chest 2 Views; Future  Consider stress test, echocardiogram     8. S/P LEEP  Pap done    9. Current moderate episode of major depressive disorder, unspecified whether recurrent (H)  Recommend counseling and restart antidepressant  - sertraline (ZOLOFT) 50 MG tablet; Take 1 tablet (50 mg) by mouth daily  Dispense: 30 tablet; Refill: 1    10. Migraine with aura and with status migrainosus, not intractable  New diagnosis  Unusual to start at this age, and aura was unusua  Would image  - MR Brain w/o & w Contrast; Future    11. Vertigo    - MR Brain w/o & w Contrast; Future    Patient has been advised of split billing requirements and indicates understanding: Yes  COUNSELING:  Reviewed preventive health counseling, as reflected in patient instructions    Estimated body mass index is 38.36 kg/m  as calculated from the following:    Height as of this encounter: 1.638 m (5' 4.5\").    Weight as of this encounter: 103 kg (227 lb).    Weight management plan: Discussed healthy diet and exercise " guidelines    She reports that she quit smoking about 12 years ago. Her smoking use included cigarettes. She has a 7.50 pack-year smoking history. She has never used smokeless tobacco.      Counseling Resources:  ATP IV Guidelines  Pooled Cohorts Equation Calculator  Breast Cancer Risk Calculator  BRCA-Related Cancer Risk Assessment: FHS-7 Tool  FRAX Risk Assessment  ICSI Preventive Guidelines  Dietary Guidelines for Americans, 2010  USDA's MyPlate  ASA Prophylaxis  Lung CA Screening    Barb Tidwell MD  [facility]

## 2020-09-16 ASSESSMENT — ANXIETY QUESTIONNAIRES: GAD7 TOTAL SCORE: 2

## 2020-09-18 LAB
COPATH REPORT: NORMAL
PAP: NORMAL

## 2020-09-22 LAB
FINAL DIAGNOSIS: NORMAL
HPV HR 12 DNA CVX QL NAA+PROBE: NEGATIVE
HPV16 DNA SPEC QL NAA+PROBE: NEGATIVE
HPV18 DNA SPEC QL NAA+PROBE: NEGATIVE
SPECIMEN DESCRIPTION: NORMAL
SPECIMEN SOURCE CVX/VAG CYTO: NORMAL

## 2020-09-23 ENCOUNTER — HOSPITAL ENCOUNTER (OUTPATIENT)
Dept: MRI IMAGING | Facility: CLINIC | Age: 40
Discharge: HOME | End: 2020-09-23
Attending: FAMILY MEDICINE | Admitting: FAMILY MEDICINE
Payer: COMMERCIAL

## 2020-09-23 DIAGNOSIS — R42 VERTIGO: ICD-10-CM

## 2020-09-23 DIAGNOSIS — G43.101 MIGRAINE WITH AURA AND WITH STATUS MIGRAINOSUS, NOT INTRACTABLE: ICD-10-CM

## 2020-09-23 PROCEDURE — 25500064 ZZH RX 255 OP 636: Performed by: FAMILY MEDICINE

## 2020-09-23 PROCEDURE — 70553 MRI BRAIN STEM W/O & W/DYE: CPT

## 2020-09-23 PROCEDURE — A9585 GADOBUTROL INJECTION: HCPCS | Performed by: FAMILY MEDICINE

## 2020-09-23 RX ORDER — GADOBUTROL 604.72 MG/ML
10 INJECTION INTRAVENOUS ONCE
Status: COMPLETED | OUTPATIENT
Start: 2020-09-23 | End: 2020-09-23

## 2020-09-23 RX ADMIN — GADOBUTROL 10 ML: 604.72 INJECTION INTRAVENOUS at 10:22

## 2020-09-30 ENCOUNTER — OFFICE VISIT (OUTPATIENT)
Dept: PODIATRY | Facility: CLINIC | Age: 40
End: 2020-09-30
Attending: FAMILY MEDICINE
Payer: COMMERCIAL

## 2020-09-30 ENCOUNTER — ANCILLARY PROCEDURE (OUTPATIENT)
Dept: GENERAL RADIOLOGY | Facility: CLINIC | Age: 40
End: 2020-09-30
Attending: PODIATRIST
Payer: COMMERCIAL

## 2020-09-30 VITALS
DIASTOLIC BLOOD PRESSURE: 82 MMHG | WEIGHT: 227 LBS | BODY MASS INDEX: 37.82 KG/M2 | HEART RATE: 67 BPM | HEIGHT: 65 IN | SYSTOLIC BLOOD PRESSURE: 134 MMHG

## 2020-09-30 DIAGNOSIS — M79.672 PAIN OF BOTH HEELS: ICD-10-CM

## 2020-09-30 DIAGNOSIS — T14.8XXA CONTUSION OF BONE: Primary | ICD-10-CM

## 2020-09-30 DIAGNOSIS — M79.671 PAIN OF BOTH HEELS: ICD-10-CM

## 2020-09-30 PROCEDURE — 99243 OFF/OP CNSLTJ NEW/EST LOW 30: CPT | Performed by: PODIATRIST

## 2020-09-30 PROCEDURE — 73630 X-RAY EXAM OF FOOT: CPT | Mod: LT

## 2020-09-30 RX ORDER — MELOXICAM 7.5 MG/1
7.5 TABLET ORAL DAILY
Qty: 30 TABLET | Refills: 1 | Status: SHIPPED | OUTPATIENT
Start: 2020-09-30 | End: 2020-11-25

## 2020-09-30 RX ORDER — METHYLPREDNISOLONE 4 MG
4 TABLET, DOSE PACK ORAL SEE ADMIN INSTRUCTIONS
Qty: 21 TABLET | Refills: 0 | Status: SHIPPED | OUTPATIENT
Start: 2020-09-30 | End: 2021-04-26

## 2020-09-30 ASSESSMENT — MIFFLIN-ST. JEOR: SCORE: 1687.61

## 2020-09-30 ASSESSMENT — PAIN SCALES - GENERAL: PAINLEVEL: EXTREME PAIN (8)

## 2020-09-30 NOTE — PROGRESS NOTES
PATIENT HISTORY:  Aliza Orr is a 40 year old female who presents to clinic for a painful right and left heel.  The patient describes the pain as sharp aching.  The patient relates the pain level is moderate.  The patient relates pain is located on the bottom sides of both heels.  The patient relates the pain has been present for the past several months.  The patient relates pain with ambulation.  The patient has tried different shoes with little relief.  The patient was sent by Dr. Tidwell for consultation on the right and left foot.       REVIEW OF SYSTEMS:  Constitutional, HEENT, cardiovascular, pulmonary, GI, , musculoskeletal, neuro, skin, endocrine and psych systems are negative, except as otherwise noted.     PAST MEDICAL HISTORY:   Past Medical History:   Diagnosis Date     Benign essential hypertension 3/7/2019     Genital herpes      HSIL (high grade squamous intraepithelial lesion) on Pap smear 1/16/12 1/24/12 colp - SHUKRI 2, 3, CIS     PMDD (premenstrual dysphoric disorder) 9/2/2014        PAST SURGICAL HISTORY:   Past Surgical History:   Procedure Laterality Date     LAPAROSCOPIC TUBAL LIGATION  4/20/2011    Procedure:LAPAROSCOPIC TUBAL LIGATION; Laparoscopic Tubal Sterlization; Surgeon:RYANNE HAWK; Location:[facility]     LEEP TX, CERVICAL  1/31/12    SHUKRI 1,2,3     SOFT TISSUE SURGERY  1997    Ankle surgery     SURGICAL HISTORY OF -   1997    ankle right     TONSILLECTOMY  2003        MEDICATIONS:   Current Outpatient Medications:      losartan (COZAAR) 50 MG tablet, Take 1 tablet (50 mg) by mouth daily, Disp: 90 tablet, Rfl: 3     sertraline (ZOLOFT) 50 MG tablet, Take 1 tablet (50 mg) by mouth daily, Disp: 30 tablet, Rfl: 1     [START ON 10/13/2020] temazepam (RESTORIL) 30 MG capsule, TAKE ONE CAPSULE BY MOUTH NIGHTLY AS NEEDED FOR SLEEP, Disp: 30 capsule, Rfl: 2     ALLERGIES:    Allergies   Allergen Reactions     Nkda [No Known Drug Allergies]         SOCIAL HISTORY:   Social History      Socioeconomic History     Marital status:      Spouse name: Not on file     Number of children: Not on file     Years of education: Not on file     Highest education level: Not on file   Occupational History     Not on file   Social Needs     Financial resource strain: Not on file     Food insecurity     Worry: Not on file     Inability: Not on file     Transportation needs     Medical: Not on file     Non-medical: Not on file   Tobacco Use     Smoking status: Former Smoker     Packs/day: 0.50     Years: 15.00     Pack years: 7.50     Types: Cigarettes     Last attempt to quit: 2008     Years since quittin.7     Smokeless tobacco: Never Used   Substance and Sexual Activity     Alcohol use: Yes     Comment: Few beers a week     Drug use: No     Sexual activity: Yes     Partners: Male     Birth control/protection: Female Surgical   Lifestyle     Physical activity     Days per week: Not on file     Minutes per session: Not on file     Stress: Not on file   Relationships     Social connections     Talks on phone: Not on file     Gets together: Not on file     Attends Anabaptist service: Not on file     Active member of club or organization: Not on file     Attends meetings of clubs or organizations: Not on file     Relationship status: Not on file     Intimate partner violence     Fear of current or ex partner: Not on file     Emotionally abused: Not on file     Physically abused: Not on file     Forced sexual activity: Not on file   Other Topics Concern      Service No     Blood Transfusions No     Caffeine Concern No     Occupational Exposure No     Hobby Hazards No     Sleep Concern Yes     Comment: Hip pain at night and not sleeping well.     Stress Concern No     Weight Concern No     Special Diet No     Back Care No     Exercise No     Bike Helmet No     Seat Belt Yes     Self-Exams No     Parent/sibling w/ CABG, MI or angioplasty before 65F 55M? No   Social History Narrative     Not on  "file        FAMILY HISTORY:   Family History   Problem Relation Age of Onset     Cancer Maternal Grandmother         pancreatic     Other Cancer Maternal Grandmother         Pancreatic     Cancer Paternal Grandfather         liver,lung     Alcohol/Drug Paternal Grandfather      Other Cancer Paternal Grandfather         Liver, Lung, Lymph Node     Gastrointestinal Disease Father         ulcer     Alcohol/Drug Father      Diabetes Father      Hypertension Father      Hyperlipidemia Father      Depression Mother      Hypertension Mother      Hyperlipidemia Mother      Depression/Anxiety Mother      Heart Disease Maternal Grandfather      Alcohol/Drug Paternal Grandmother      Respiratory Daughter      Alcohol/Drug Brother      Cerebrovascular Disease Brother      Substance Abuse Brother         EXAM:Vitals: /82   Pulse 67   Ht 1.638 m (5' 4.5\")   Wt 103 kg (227 lb)   LMP 09/05/2020   BMI 38.36 kg/m    BMI= Body mass index is 38.36 kg/m .         General appearance: Patient is alert and fully cooperative with history & exam.  No sign of distress is noted during the visit.     Psychiatric: Affect is pleasant & appropriate.  Patient appears motivated to improve health.     Respiratory: Breathing is regular & unlabored while sitting.     HEENT: Hearing is intact to spoken word.  Speech is clear.  No gross evidence of visual impairment that would impact ambulation.     Dermatologic: Skin is intact to right and left lower extremities without significant lesions, rash or abrasion.  No paronychia or evidence of soft tissue infection is noted.     Vascular: DP & PT pulses are intact & regular on the right and left.  No significant edema or varicosities noted.  CFT and skin temperature is normal to the right and left lower extremities.     Neurologic: Lower extremity sensation is intact to light touch.  No evidence of weakness or contracture in the right and left lower extremities.  No evidence of neuropathy.   "   Musculoskeletal: Patient is ambulatory without assistive device or brace.  No gross ankle deformity noted.  No foot or ankle joint effusion is noted.  Noted pain with side to side compression both heels.  No surrounding erythema noted.  No significant pain noted to the insertion points of either the Achilles tendon or plantar fascia bilaterally.    Radiographs were evaluated including AP, lateral and medial oblique views of the right and left foot reveals  no cortical erosions or periosteal elevation.  All joint margins appear stable.  There is no apparent fracture or tumor formation noted.  There is no evidence of foreign body.  Noted calcaneal spurs bilaterally    ASSESSMENT / PLAN:     ICD-10-CM    1. Pain of both heels  M79.671 Orthopedic & Spine  Referral    M79.672 XR Foot Bilateral G/E 3 Views       I have explained to Aliza  about the conditions.  We discussed the nature of the condition as well as the treatment plan and expected length of recovery.  At this time, the patient was instructed on icing, stretching, tissue massage and support.  The patient was fitted with a short cam boot that will aid in offloading the tension forces to the soft tissues and prevent further inflammation.  To reduce the amount of current inflammation, the patient was prescribed a Medrol Dospak followed by Mobic 7.5 mg to be taken daily with food and instructed to stop taking if any stomach irritation or swelling in extremities are noted.  The patient will return in four weeks for reevaluation if the symptoms do not resolve.        Aliza verbalized agreement with and understanding of the rational for the diagnosis and treatment plan.  All questions were answered to best of my ability and the patient's satisfaction. The patient was advised to contact the clinic with any questions that may arise after the clinic visit.      Disclaimer: This note consists of symbols derived from keyboarding, dictation and/or voice  recognition software. As a result, there may be errors in the script that have gone undetected. Please consider this when interpreting information found in this chart.       FRANKIE Longo D.P.M., TOOTIE.F.A.S.

## 2020-09-30 NOTE — NURSING NOTE
"Chief Complaint   Patient presents with     Consult     BL heel pain, X1 year, lateral and medial side of heels \"pain is all day and worsening as day goes on\"       Initial /82   Pulse 67   Ht 1.638 m (5' 4.5\")   Wt 103 kg (227 lb)   LMP 09/05/2020   BMI 38.36 kg/m   Estimated body mass index is 38.36 kg/m  as calculated from the following:    Height as of this encounter: 1.638 m (5' 4.5\").    Weight as of this encounter: 103 kg (227 lb).  Medications and allergies reviewed.      Ximena SHARMA MA    "

## 2020-09-30 NOTE — LETTER
9/30/2020         RE: Aliza Orr  [address]  [id number]        Dear Colleague,    Thank you for referring your patient, Aliza Orr, to the [facility]. Please see a copy of my visit note below.    PATIENT HISTORY:  Aliza Orr is a 40 year old female who presents to clinic for a painful right and left heel.  The patient describes the pain as sharp aching.  The patient relates the pain level is moderate.  The patient relates pain is located on the bottom sides of both heels.  The patient relates the pain has been present for the past several months.  The patient relates pain with ambulation.  The patient has tried different shoes with little relief.  The patient was sent by Dr. Tidwell for consultation on the right and left foot.       REVIEW OF SYSTEMS:  Constitutional, HEENT, cardiovascular, pulmonary, GI, , musculoskeletal, neuro, skin, endocrine and psych systems are negative, except as otherwise noted.     PAST MEDICAL HISTORY:   Past Medical History:   Diagnosis Date     Benign essential hypertension 3/7/2019     Genital herpes      HSIL (high grade squamous intraepithelial lesion) on Pap smear 1/16/12 1/24/12 colp - SHUKRI 2, 3, CIS     PMDD (premenstrual dysphoric disorder) 9/2/2014        PAST SURGICAL HISTORY:   Past Surgical History:   Procedure Laterality Date     LAPAROSCOPIC TUBAL LIGATION  4/20/2011    Procedure:LAPAROSCOPIC TUBAL LIGATION; Laparoscopic Tubal Sterlization; Surgeon:RYANNE HAWK; Location:[place] [facility]     LEEP TX, CERVICAL  1/31/12    SHUKRI 1,2,3     SOFT TISSUE SURGERY  1997    Ankle surgery     SURGICAL HISTORY OF -   1997    ankle right     TONSILLECTOMY  2003        MEDICATIONS:   Current Outpatient Medications:      losartan (COZAAR) 50 MG tablet, Take 1 tablet (50 mg) by mouth daily, Disp: 90 tablet, Rfl: 3     sertraline (ZOLOFT) 50 MG tablet, Take 1 tablet (50 mg) by mouth daily, Disp: 30 tablet, Rfl: 1     [START ON 10/13/2020]  temazepam (RESTORIL) 30 MG capsule, TAKE ONE CAPSULE BY MOUTH NIGHTLY AS NEEDED FOR SLEEP, Disp: 30 capsule, Rfl: 2     ALLERGIES:    Allergies   Allergen Reactions     Nkda [No Known Drug Allergies]         SOCIAL HISTORY:   Social History     Socioeconomic History     Marital status:      Spouse name: Not on file     Number of children: Not on file     Years of education: Not on file     Highest education level: Not on file   Occupational History     Not on file   Social Needs     Financial resource strain: Not on file     Food insecurity     Worry: Not on file     Inability: Not on file     Transportation needs     Medical: Not on file     Non-medical: Not on file   Tobacco Use     Smoking status: Former Smoker     Packs/day: 0.50     Years: 15.00     Pack years: 7.50     Types: Cigarettes     Last attempt to quit: 2008     Years since quittin.7     Smokeless tobacco: Never Used   Substance and Sexual Activity     Alcohol use: Yes     Comment: Few beers a week     Drug use: No     Sexual activity: Yes     Partners: Male     Birth control/protection: Female Surgical   Lifestyle     Physical activity     Days per week: Not on file     Minutes per session: Not on file     Stress: Not on file   Relationships     Social connections     Talks on phone: Not on file     Gets together: Not on file     Attends Holiness service: Not on file     Active member of club or organization: Not on file     Attends meetings of clubs or organizations: Not on file     Relationship status: Not on file     Intimate partner violence     Fear of current or ex partner: Not on file     Emotionally abused: Not on file     Physically abused: Not on file     Forced sexual activity: Not on file   Other Topics Concern      Service No     Blood Transfusions No     Caffeine Concern No     Occupational Exposure No     Hobby Hazards No     Sleep Concern Yes     Comment: Hip pain at night and not sleeping well.     Stress  "Concern No     Weight Concern No     Special Diet No     Back Care No     Exercise No     Bike Helmet No     Seat Belt Yes     Self-Exams No     Parent/sibling w/ CABG, MI or angioplasty before 65F 55M? No   Social History Narrative     Not on file        FAMILY HISTORY:   Family History   Problem Relation Age of Onset     Cancer Maternal Grandmother         pancreatic     Other Cancer Maternal Grandmother         Pancreatic     Cancer Paternal Grandfather         liver,lung     Alcohol/Drug Paternal Grandfather      Other Cancer Paternal Grandfather         Liver, Lung, Lymph Node     Gastrointestinal Disease Father         ulcer     Alcohol/Drug Father      Diabetes Father      Hypertension Father      Hyperlipidemia Father      Depression Mother      Hypertension Mother      Hyperlipidemia Mother      Depression/Anxiety Mother      Heart Disease Maternal Grandfather      Alcohol/Drug Paternal Grandmother      Respiratory Daughter      Alcohol/Drug Brother      Cerebrovascular Disease Brother      Substance Abuse Brother         EXAM:Vitals: /82   Pulse 67   Ht 1.638 m (5' 4.5\")   Wt 103 kg (227 lb)   LMP 09/05/2020   BMI 38.36 kg/m    BMI= Body mass index is 38.36 kg/m .         General appearance: Patient is alert and fully cooperative with history & exam.  No sign of distress is noted during the visit.     Psychiatric: Affect is pleasant & appropriate.  Patient appears motivated to improve health.     Respiratory: Breathing is regular & unlabored while sitting.     HEENT: Hearing is intact to spoken word.  Speech is clear.  No gross evidence of visual impairment that would impact ambulation.     Dermatologic: Skin is intact to right and left lower extremities without significant lesions, rash or abrasion.  No paronychia or evidence of soft tissue infection is noted.     Vascular: DP & PT pulses are intact & regular on the right and left.  No significant edema or varicosities noted.  CFT and skin " temperature is normal to the right and left lower extremities.     Neurologic: Lower extremity sensation is intact to light touch.  No evidence of weakness or contracture in the right and left lower extremities.  No evidence of neuropathy.     Musculoskeletal: Patient is ambulatory without assistive device or brace.  No gross ankle deformity noted.  No foot or ankle joint effusion is noted.  Noted pain with side to side compression both heels.  No surrounding erythema noted.  No significant pain noted to the insertion points of either the Achilles tendon or plantar fascia bilaterally.    Radiographs were evaluated including AP, lateral and medial oblique views of the right and left foot reveals  no cortical erosions or periosteal elevation.  All joint margins appear stable.  There is no apparent fracture or tumor formation noted.  There is no evidence of foreign body.  Noted calcaneal spurs bilaterally    ASSESSMENT / PLAN:     ICD-10-CM    1. Pain of both heels  M79.671 Orthopedic & Spine  Referral    M79.672 XR Foot Bilateral G/E 3 Views       I have explained to Aliza  about the conditions.  We discussed the nature of the condition as well as the treatment plan and expected length of recovery.  At this time, the patient was instructed on icing, stretching, tissue massage and support.  The patient was fitted with a short cam boot that will aid in offloading the tension forces to the soft tissues and prevent further inflammation.  To reduce the amount of current inflammation, the patient was prescribed a Medrol Dospak followed by Mobic 7.5 mg to be taken daily with food and instructed to stop taking if any stomach irritation or swelling in extremities are noted.  The patient will return in four weeks for reevaluation if the symptoms do not resolve.        Aliza verbalized agreement with and understanding of the rational for the diagnosis and treatment plan.  All questions were answered to best of my  ability and the patient's satisfaction. The patient was advised to contact the clinic with any questions that may arise after the clinic visit.      Disclaimer: This note consists of symbols derived from keyboarding, dictation and/or voice recognition software. As a result, there may be errors in the script that have gone undetected. Please consider this when interpreting information found in this chart.       FRANKIE Longo D.P.M., F.A.C.F.A.S.        Again, thank you for allowing me to participate in the care of your patient.        Sincerely,        Junior Longo DPM

## 2020-10-05 ENCOUNTER — OFFICE VISIT (OUTPATIENT)
Dept: SURGERY | Facility: CLINIC | Age: 40
End: 2020-10-05
Attending: FAMILY MEDICINE
Payer: COMMERCIAL

## 2020-10-05 VITALS
SYSTOLIC BLOOD PRESSURE: 115 MMHG | DIASTOLIC BLOOD PRESSURE: 71 MMHG | BODY MASS INDEX: 37.82 KG/M2 | TEMPERATURE: 97.6 F | HEIGHT: 65 IN | WEIGHT: 227 LBS | HEART RATE: 62 BPM

## 2020-10-05 DIAGNOSIS — K60.2 FISSURE IN ANO: Primary | ICD-10-CM

## 2020-10-05 DIAGNOSIS — K62.5 RECTAL BLEEDING: ICD-10-CM

## 2020-10-05 PROCEDURE — 46600 DIAGNOSTIC ANOSCOPY SPX: CPT | Performed by: SURGERY

## 2020-10-05 PROCEDURE — 99203 OFFICE O/P NEW LOW 30 MIN: CPT | Mod: 25 | Performed by: SURGERY

## 2020-10-05 ASSESSMENT — MIFFLIN-ST. JEOR: SCORE: 1687.61

## 2020-10-05 NOTE — PATIENT INSTRUCTIONS
Per physician instructions      If you have questions or concerns on any instructions given to you by your provider today or if you need to schedule an appointment, you can reach us at [phone].

## 2020-10-05 NOTE — PROGRESS NOTES
"PCP:  Barb Mejia    Chief complaint:rectal bleeding    History of Present Illness: Aliza is a 40-year-old female presents with significant rectal bleeding.  She has a history of a fissure when she had a child many years ago, but that healed up with very conservative measures.    Within the last 6 months she has had problems with bleeding with each bowel movement.  She says that she is very rigid about taking Metamucil every day, but if she misses even 1 dose she will have bleeding with a bowel movement.  She has pain with defecation but it is not severe.  The stools will then just drip from her anus \"feel the bowl\".    She has no family history of colon cancers, but her father recently had a colonoscopy with the finding of some benign polyps.    Histories:  Past Medical History:   Diagnosis Date     Benign essential hypertension 3/7/2019     Genital herpes      HSIL (high grade squamous intraepithelial lesion) on Pap smear 1/16/12 1/24/12 colp - SHUKRI 2, 3, CIS     PMDD (premenstrual dysphoric disorder) 9/2/2014       Past Surgical History:   Procedure Laterality Date     LAPAROSCOPIC TUBAL LIGATION  4/20/2011    Procedure:LAPAROSCOPIC TUBAL LIGATION; Laparoscopic Tubal Sterlization; Surgeon:RYANNE HAWK; Location:[place] [facility]     LEEP TX, CERVICAL  1/31/12    SHUKRI 1,2,3     SOFT TISSUE SURGERY  1997    Ankle surgery     SURGICAL HISTORY OF -   1997    ankle right     TONSILLECTOMY  2003       Family History   Problem Relation Age of Onset     Cancer Maternal Grandmother         pancreatic     Other Cancer Maternal Grandmother         Pancreatic     Cancer Paternal Grandfather         liver,lung     Alcohol/Drug Paternal Grandfather      Other Cancer Paternal Grandfather         Liver, Lung, Lymph Node     Gastrointestinal Disease Father         ulcer     Alcohol/Drug Father      Diabetes Father      Hypertension Father      Hyperlipidemia Father      Depression Mother      Hypertension Mother      " Hyperlipidemia Mother      Depression/Anxiety Mother      Heart Disease Maternal Grandfather      Alcohol/Drug Paternal Grandmother      Respiratory Daughter      Alcohol/Drug Brother      Cerebrovascular Disease Brother      Substance Abuse Brother        Social History     Tobacco Use     Smoking status: Former Smoker     Packs/day: 0.50     Years: 15.00     Pack years: 7.50     Types: Cigarettes     Quit date: 2008     Years since quittin.7     Smokeless tobacco: Never Used   Substance Use Topics     Alcohol use: Yes     Comment: Few beers a week       Current Outpatient Medications   Medication Sig Dispense Refill     losartan (COZAAR) 50 MG tablet Take 1 tablet (50 mg) by mouth daily 90 tablet 3     methylPREDNISolone (MEDROL DOSEPAK) 4 MG tablet therapy pack Take 1 tablet (4 mg) by mouth See Admin Instructions Tapered dose 21 tablet 0     nifedipine 0.2% in white petrolatum 0.2 % OINT ointment Apply topically 2 times daily 30 g 1     sertraline (ZOLOFT) 50 MG tablet Take 1 tablet (50 mg) by mouth daily 30 tablet 1     [START ON 10/13/2020] temazepam (RESTORIL) 30 MG capsule TAKE ONE CAPSULE BY MOUTH NIGHTLY AS NEEDED FOR SLEEP 30 capsule 2     meloxicam (MOBIC) 7.5 MG tablet Take 1 tablet (7.5 mg) by mouth daily (Patient not taking: Reported on 10/5/2020) 30 tablet 1       Allergies   Allergen Reactions     Nkda [No Known Drug Allergies]        Images:  Recent Results (from the past 744 hour(s))   XR Chest 2 Views    Narrative    CHEST TWO VIEWS 9/15/2020 5:11 PM     HISTORY: SOB (shortness of breath).    COMPARISON: 2012.        Impression    IMPRESSION: There are no acute infiltrates. The cardiac silhouette is  not enlarged. Pulmonary vasculature is unremarkable.    BRUNILDA BALDERRAMA MD   MR Brain w/o & w Contrast    Narrative    MRI BRAIN WITHOUT AND WITH CONTRAST  2020 10:56 AM    HISTORY:  New headache with aura, vertigo. Migraine with aura and with  status migrainosus, not intractable.      TECHNIQUE:  Multiplanar, multisequence MRI of the brain without and  with 10 mL Gadavist.    COMPARISON: None.    FINDINGS:   Intracranial contents: There are a few nonspecific nonenhancing T2  hyperintensities in the white matter.  There is no evidence of  hemorrhage, mass, acute infarct, or anomaly.  There are no gadolinium  enhancing lesions. The arteries at the base of the brain and the dural  venous sinuses appear patent.     Sella:  No significant abnormality accounting for technique.     Orbit: No significant abnormality accounting for technique.      Sinus/mastoids: No significant paranasal sinus mucosal disease. No  significant middle ear or mastoid effusion.    Bones/soft tissues: No aggressive osseous lesion involving the  calvarium, skull base, or visualized upper cervical spine.       Impression    IMPRESSION:    1. No acute pathology, no bleed, mass, or acute infarcts.  2. There are a few nonspecific, nonenhancing T2 hyperintensities.  These may be due to a previous infectious or inflammatory process.  They could also be due to small vessel ischemic change. Hypertensive  patients or diabetic patients or more likely to have these type of  changes. Patients with migraine-type headaches can also have these  type of hyperintensities.     IVETTE HER MD   XR Foot Bilateral G/E 3 Views    Narrative    FOOT BILATERAL THREE OR MORE VIEWS   9/30/2020 5:33 PM     HISTORY: Pain of both heels.    COMPARISON: Left foot exam 4/30/2015.      Impression    IMPRESSION:     Right foot: No acute bony or soft tissue abnormality. Minimal plantar  and posterior calcaneal spurs.    Left foot: No acute bony or soft tissue abnormality. Minimal posterior  calcaneal spur without change. Small plantar calcaneal spur has  enlarged since the prior exam.    OUMAR HADDAD MD       Labs:  No results found for any visits on 10/05/20.    ROS:  Constitutional - Denies fevers, weight loss, malaise, lethargy  Neuro - Denies tremors or  "seizures  Pulmon - Denies SOB, dyspnea, hemoptysis, chronic cough or use of an inhaler  CV - Denies CP, SOB, lower extremity edema, difficulty w/ stairs, has never used NTG   - Denies hematuria, difficulty voiding  Hematology - Denies blood clotting disorders, chronic anemias  Dermatology - No melanomas or skin cancers  Rheumatology - No h/o RA      /71 (BP Location: Right arm, Patient Position: Sitting, Cuff Size: Adult Large)   Pulse 62   Temp 97.6  F (36.4  C) (Tympanic)   Ht 1.638 m (5' 4.5\")   Wt 103 kg (227 lb)   LMP 09/05/2020   BMI 38.36 kg/m      Exam:  General - Alert and Oriented X4, NAD, well nourished  HEENT - Normocephalic, atraumatic  Neck - supple  Lungs -respirations unlabored  CV - Heart RRR  Abdomen - Soft    Rectal -perianal exam is normal with a tiny skin tag anterior, digital exam shows tenderness to palpation on the anterior anal wall is subtle irregularity of the mucosa consistent with fissure, anoscopic exam shows some internal hemorrhoids, but nothing significant.  No other findings.      Neuro -intact  Extremities - No cyanosis, clubbing or edema.      Assessment and Plan: My presumptive diagnosis is an anal fissure.  The symptoms and that exam fit with that although it is not a traumatic fissure.  I just do not see that her internal hemorrhoids would be the source here.    Recommendation: Let us treat this as a fissure with continuation of her Metamucil and plenty of fluid intake.  I will prescribe her the nifedipine treatment of the fissure and when asked that she return in a month if it is not working.      If she returns with continued symptoms, I would put a rubber band on some hemorrhoids and see if that works.  Failing that, a Botox injection.    Addi Johnson MD FACS           "

## 2020-10-05 NOTE — NURSING NOTE
"Initial /71 (BP Location: Right arm, Patient Position: Sitting, Cuff Size: Adult Large)   Pulse 62   Temp 97.6  F (36.4  C) (Tympanic)   Ht 1.638 m (5' 4.5\")   Wt 103 kg (227 lb)   LMP 09/05/2020   BMI 38.36 kg/m   Estimated body mass index is 38.36 kg/m  as calculated from the following:    Height as of this encounter: 1.638 m (5' 4.5\").    Weight as of this encounter: 103 kg (227 lb). .    Jessika Stanford CMA    "

## 2020-10-05 NOTE — LETTER
"    10/5/2020         RE: Aliza Orr  [address] [facility] [address]        Dear Colleague,    Thank you for referring your patient, Aliza Orr, to the [facility]. Please see a copy of my visit note below.    PCP:  Barb Mejia    Chief complaint:rectal bleeding    History of Present Illness: Aliza is a 40-year-old female presents with significant rectal bleeding.  She has a history of a fissure when she had a child many years ago, but that healed up with very conservative measures.    Within the last 6 months she has had problems with bleeding with each bowel movement.  She says that she is very rigid about taking Metamucil every day, but if she misses even 1 dose she will have bleeding with a bowel movement.  She has pain with defecation but it is not severe.  The stools will then just drip from her anus \"feel the bowl\".    She has no family history of colon cancers, but her father recently had a colonoscopy with the finding of some benign polyps.    Histories:  Past Medical History:   Diagnosis Date     Benign essential hypertension 3/7/2019     Genital herpes      HSIL (high grade squamous intraepithelial lesion) on Pap smear 1/16/12 1/24/12 colp - SHUKRI 2, 3, CIS     PMDD (premenstrual dysphoric disorder) 9/2/2014       Past Surgical History:   Procedure Laterality Date     LAPAROSCOPIC TUBAL LIGATION  4/20/2011    Procedure:LAPAROSCOPIC TUBAL LIGATION; Laparoscopic Tubal Sterlization; Surgeon:RYANNE HAWK; Location:[place] [facility]     LEEP TX, CERVICAL  1/31/12    SHUKRI 1,2,3     SOFT TISSUE SURGERY  1997    Ankle surgery     SURGICAL HISTORY OF -   1997    ankle right     TONSILLECTOMY  2003       Family History   Problem Relation Age of Onset     Cancer Maternal Grandmother         pancreatic     Other Cancer Maternal Grandmother         Pancreatic     Cancer Paternal Grandfather         liver,lung     Alcohol/Drug Paternal Grandfather      Other Cancer Paternal " Grandfather         Liver, Lung, Lymph Node     Gastrointestinal Disease Father         ulcer     Alcohol/Drug Father      Diabetes Father      Hypertension Father      Hyperlipidemia Father      Depression Mother      Hypertension Mother      Hyperlipidemia Mother      Depression/Anxiety Mother      Heart Disease Maternal Grandfather      Alcohol/Drug Paternal Grandmother      Respiratory Daughter      Alcohol/Drug Brother      Cerebrovascular Disease Brother      Substance Abuse Brother        Social History     Tobacco Use     Smoking status: Former Smoker     Packs/day: 0.50     Years: 15.00     Pack years: 7.50     Types: Cigarettes     Quit date: 2008     Years since quittin.7     Smokeless tobacco: Never Used   Substance Use Topics     Alcohol use: Yes     Comment: Few beers a week       Current Outpatient Medications   Medication Sig Dispense Refill     losartan (COZAAR) 50 MG tablet Take 1 tablet (50 mg) by mouth daily 90 tablet 3     methylPREDNISolone (MEDROL DOSEPAK) 4 MG tablet therapy pack Take 1 tablet (4 mg) by mouth See Admin Instructions Tapered dose 21 tablet 0     nifedipine 0.2% in white petrolatum 0.2 % OINT ointment Apply topically 2 times daily 30 g 1     sertraline (ZOLOFT) 50 MG tablet Take 1 tablet (50 mg) by mouth daily 30 tablet 1     [START ON 10/13/2020] temazepam (RESTORIL) 30 MG capsule TAKE ONE CAPSULE BY MOUTH NIGHTLY AS NEEDED FOR SLEEP 30 capsule 2     meloxicam (MOBIC) 7.5 MG tablet Take 1 tablet (7.5 mg) by mouth daily (Patient not taking: Reported on 10/5/2020) 30 tablet 1       Allergies   Allergen Reactions     Nkda [No Known Drug Allergies]        Images:  Recent Results (from the past 744 hour(s))   XR Chest 2 Views    Narrative    CHEST TWO VIEWS 9/15/2020 5:11 PM     HISTORY: SOB (shortness of breath).    COMPARISON: 2012.        Impression    IMPRESSION: There are no acute infiltrates. The cardiac silhouette is  not enlarged. Pulmonary vasculature is  unremarkable.    BRUNILDA BALDERRAMA MD   MR Brain w/o & w Contrast    Narrative    MRI BRAIN WITHOUT AND WITH CONTRAST  9/23/2020 10:56 AM    HISTORY:  New headache with aura, vertigo. Migraine with aura and with  status migrainosus, not intractable.     TECHNIQUE:  Multiplanar, multisequence MRI of the brain without and  with 10 mL Gadavist.    COMPARISON: None.    FINDINGS:   Intracranial contents: There are a few nonspecific nonenhancing T2  hyperintensities in the white matter.  There is no evidence of  hemorrhage, mass, acute infarct, or anomaly.  There are no gadolinium  enhancing lesions. The arteries at the base of the brain and the dural  venous sinuses appear patent.     Sella:  No significant abnormality accounting for technique.     Orbit: No significant abnormality accounting for technique.      Sinus/mastoids: No significant paranasal sinus mucosal disease. No  significant middle ear or mastoid effusion.    Bones/soft tissues: No aggressive osseous lesion involving the  calvarium, skull base, or visualized upper cervical spine.       Impression    IMPRESSION:    1. No acute pathology, no bleed, mass, or acute infarcts.  2. There are a few nonspecific, nonenhancing T2 hyperintensities.  These may be due to a previous infectious or inflammatory process.  They could also be due to small vessel ischemic change. Hypertensive  patients or diabetic patients or more likely to have these type of  changes. Patients with migraine-type headaches can also have these  type of hyperintensities.     IVETTE HER MD   XR Foot Bilateral G/E 3 Views    Narrative    FOOT BILATERAL THREE OR MORE VIEWS   9/30/2020 5:33 PM     HISTORY: Pain of both heels.    COMPARISON: Left foot exam 4/30/2015.      Impression    IMPRESSION:     Right foot: No acute bony or soft tissue abnormality. Minimal plantar  and posterior calcaneal spurs.    Left foot: No acute bony or soft tissue abnormality. Minimal posterior  calcaneal spur without  "change. Small plantar calcaneal spur has  enlarged since the prior exam.    OUMAR HADDAD MD       Labs:  No results found for any visits on 10/05/20.    ROS:  Constitutional - Denies fevers, weight loss, malaise, lethargy  Neuro - Denies tremors or seizures  Pulmon - Denies SOB, dyspnea, hemoptysis, chronic cough or use of an inhaler  CV - Denies CP, SOB, lower extremity edema, difficulty w/ stairs, has never used NTG   - Denies hematuria, difficulty voiding  Hematology - Denies blood clotting disorders, chronic anemias  Dermatology - No melanomas or skin cancers  Rheumatology - No h/o RA      /71 (BP Location: Right arm, Patient Position: Sitting, Cuff Size: Adult Large)   Pulse 62   Temp 97.6  F (36.4  C) (Tympanic)   Ht 1.638 m (5' 4.5\")   Wt 103 kg (227 lb)   LMP 09/05/2020   BMI 38.36 kg/m      Exam:  General - Alert and Oriented X4, NAD, well nourished  HEENT - Normocephalic, atraumatic  Neck - supple  Lungs -respirations unlabored  CV - Heart RRR  Abdomen - Soft    Rectal -perianal exam is normal with a tiny skin tag anterior, digital exam shows tenderness to palpation on the anterior anal wall is subtle irregularity of the mucosa consistent with fissure, anoscopic exam shows some internal hemorrhoids, but nothing significant.  No other findings.      Neuro -intact  Extremities - No cyanosis, clubbing or edema.      Assessment and Plan: My presumptive diagnosis is an anal fissure.  The symptoms and that exam fit with that although it is not a traumatic fissure.  I just do not see that her internal hemorrhoids would be the source here.    Recommendation: Let us treat this as a fissure with continuation of her Metamucil and plenty of fluid intake.  I will prescribe her the nifedipine treatment of the fissure and when asked that she return in a month if it is not working.      If she returns with continued symptoms, I would put a rubber band on some hemorrhoids and see if that works.  Failing " that, a Botox injection.    Addi Johnson MD FACS               Again, thank you for allowing me to participate in the care of your patient.        Sincerely,        Addi Johnson MD

## 2020-10-19 DIAGNOSIS — Z12.31 VISIT FOR SCREENING MAMMOGRAM: ICD-10-CM

## 2020-11-08 DIAGNOSIS — F32.1 CURRENT MODERATE EPISODE OF MAJOR DEPRESSIVE DISORDER, UNSPECIFIED WHETHER RECURRENT (H): ICD-10-CM

## 2020-11-10 NOTE — TELEPHONE ENCOUNTER
"Requested Prescriptions   Pending Prescriptions Disp Refills     sertraline (ZOLOFT) 50 MG tablet [Pharmacy Med Name: SERTRALINE HCL 50MG TABS] 30 tablet 1     Sig: TAKE ONE TABLET BY MOUTH ONCE DAILY       SSRIs Protocol Failed - 11/8/2020  5:01 AM        Failed - PHQ-9 score less than 5 in past 6 months     Please review last PHQ-9 score.           Passed - Medication is active on med list        Passed - Patient is age 18 or older        Passed - No active pregnancy on record        Passed - No positive pregnancy test in last 12 months        Passed - Recent (6 mo) or future (30 days) visit within the authorizing provider's specialty     Patient had office visit in the last 6 months or has a visit in the next 30 days with authorizing provider or within the authorizing provider's specialty.  See \"Patient Info\" tab in inbasket, or \"Choose Columns\" in Meds & Orders section of the refill encounter.                 "

## 2020-12-15 ENCOUNTER — MYC REFILL (OUTPATIENT)
Dept: FAMILY MEDICINE | Facility: CLINIC | Age: 40
End: 2020-12-15

## 2020-12-15 DIAGNOSIS — I10 BENIGN ESSENTIAL HYPERTENSION: ICD-10-CM

## 2020-12-16 RX ORDER — LOSARTAN POTASSIUM 50 MG/1
50 TABLET ORAL DAILY
Qty: 90 TABLET | Refills: 3 | Status: SHIPPED | OUTPATIENT
Start: 2020-12-16 | End: 2021-11-18

## 2021-01-16 DIAGNOSIS — G47.00 PERSISTENT INSOMNIA: ICD-10-CM

## 2021-01-18 RX ORDER — TEMAZEPAM 30 MG
CAPSULE ORAL
Qty: 30 CAPSULE | Refills: 2 | Status: SHIPPED | OUTPATIENT
Start: 2021-01-18 | End: 2021-04-15

## 2021-04-14 DIAGNOSIS — G47.00 PERSISTENT INSOMNIA: ICD-10-CM

## 2021-04-15 RX ORDER — TEMAZEPAM 30 MG
CAPSULE ORAL
Qty: 30 CAPSULE | Refills: 2 | Status: SHIPPED | OUTPATIENT
Start: 2021-04-15 | End: 2021-07-19

## 2021-04-15 NOTE — TELEPHONE ENCOUNTER
Requested Prescriptions   Pending Prescriptions Disp Refills     temazepam (RESTORIL) 30 MG capsule [Pharmacy Med Name: TEMAZEPAM 30MG CAPS] 30 capsule 2     Sig: TAKE ONE CAPSULE BY MOUTH NIGHTLY AS NEEDED FOR SLEEP       There is no refill protocol information for this order

## 2021-04-26 ENCOUNTER — OFFICE VISIT (OUTPATIENT)
Dept: SURGERY | Facility: CLINIC | Age: 41
End: 2021-04-26
Payer: COMMERCIAL

## 2021-04-26 VITALS
HEIGHT: 65 IN | HEART RATE: 83 BPM | TEMPERATURE: 97.4 F | DIASTOLIC BLOOD PRESSURE: 87 MMHG | WEIGHT: 227 LBS | BODY MASS INDEX: 37.82 KG/M2 | SYSTOLIC BLOOD PRESSURE: 134 MMHG

## 2021-04-26 DIAGNOSIS — K62.5 RECTAL BLEEDING: Primary | ICD-10-CM

## 2021-04-26 DIAGNOSIS — K60.2 FISSURE IN ANO: ICD-10-CM

## 2021-04-26 PROCEDURE — 99213 OFFICE O/P EST LOW 20 MIN: CPT | Performed by: SURGERY

## 2021-04-26 ASSESSMENT — MIFFLIN-ST. JEOR: SCORE: 1687.61

## 2021-04-26 NOTE — NURSING NOTE
"Initial /87 (BP Location: Left arm, Patient Position: Sitting, Cuff Size: Adult Large)   Pulse 83   Temp 97.4  F (36.3  C) (Tympanic)   Ht 1.638 m (5' 4.5\")   Wt 103 kg (227 lb)   BMI 38.36 kg/m   Estimated body mass index is 38.36 kg/m  as calculated from the following:    Height as of this encounter: 1.638 m (5' 4.5\").    Weight as of this encounter: 103 kg (227 lb). .    Jessika Stanford CMA    "

## 2021-04-26 NOTE — PROGRESS NOTES
Aliza is a 40-year-old female who I saw last fall for rectal bleeding.  The diagnosis at that time was not fissure and she has been treated with nifedipine ointment.  Initially she had good results with that medication, but after a month or so the fissure returned and since that time it has been an intermittent problem for her.  Last week Tuesday was a very bad day for her and that she had exquisite tenderness and can even sleep for 2 nights.  She has had a change in her bowel habits in the last 6 months for on known reasons.  She has some diarrhea intermittent with more formed stools.  The bleeding is less of an issue than it was last time, but still persistent.    She has no family history of colon cancers.    I did not examine her today, but rather spent our time discussing the plan from this point.  I am confident that the problem is due to a fissure, but she has not had a colonoscopy so I think that is a reasonable thing to do.  Colonoscopy would be valuable to rule out any type of colitis, polyps, cancers etc.    We discussed further treatment options including Botox and possibly even sphincterotomy.  She would prefer to attempt a Botox injection before she goes for surgical treatment.  She has some concerns, rightly, about incontinence as a complication of the procedure.    Therefore, the plan will be to schedule her for a colonoscopy with the indication being bleeding and diarrhea.  Secondly, we will attempt to get coverage for Botox.  Then we can inject that at her next office visit.  If that fails we will is got sphincterotomy.    We discussed the risks and benefits of Botox injection including incontinence which would be a self-limited thing if it happened.     Orders were written, and the patient was in agreement.    Addi Johnson MD FACS

## 2021-04-26 NOTE — PATIENT INSTRUCTIONS
Per physician instructions      If you have questions or concerns on any instructions given to you by your provider today or if you need to schedule an appointment, you can reach us at [phone].

## 2021-04-26 NOTE — LETTER
4/26/2021         RE: Aliza Orr  [address] [facility] [address]        Dear Colleague,    Thank you for referring your patient, Aliza Orr, to the [facility]. Please see a copy of my visit note below.    Aliza is a 40-year-old female who I saw last fall for rectal bleeding.  The diagnosis at that time was not fissure and she has been treated with nifedipine ointment.  Initially she had good results with that medication, but after a month or so the fissure returned and since that time it has been an intermittent problem for her.  Last week Tuesday was a very bad day for her and that she had exquisite tenderness and can even sleep for 2 nights.  She has had a change in her bowel habits in the last 6 months for on known reasons.  She has some diarrhea intermittent with more formed stools.  The bleeding is less of an issue than it was last time, but still persistent.    She has no family history of colon cancers.    I did not examine her today, but rather spent our time discussing the plan from this point.  I am confident that the problem is due to a fissure, but she has not had a colonoscopy so I think that is a reasonable thing to do.  Colonoscopy would be valuable to rule out any type of colitis, polyps, cancers etc.    We discussed further treatment options including Botox and possibly even sphincterotomy.  She would prefer to attempt a Botox injection before she goes for surgical treatment.  She has some concerns, rightly, about incontinence as a complication of the procedure.    Therefore, the plan will be to schedule her for a colonoscopy with the indication being bleeding and diarrhea.  Secondly, we will attempt to get coverage for Botox.  Then we can inject that at her next office visit.  If that fails we will is got sphincterotomy.    We discussed the risks and benefits of Botox injection including incontinence which would be a self-limited thing if it happened.      Orders were written, and the patient was in agreement.    Addi Johnson MD FACS      Again, thank you for allowing me to participate in the care of your patient.        Sincerely,        Addi Johnson MD

## 2021-04-27 DIAGNOSIS — Z11.59 ENCOUNTER FOR SCREENING FOR OTHER VIRAL DISEASES: ICD-10-CM

## 2021-05-02 DIAGNOSIS — F32.1 CURRENT MODERATE EPISODE OF MAJOR DEPRESSIVE DISORDER, UNSPECIFIED WHETHER RECURRENT (H): ICD-10-CM

## 2021-05-04 DIAGNOSIS — Z11.59 ENCOUNTER FOR SCREENING FOR OTHER VIRAL DISEASES: ICD-10-CM

## 2021-05-04 LAB
LABORATORY COMMENT REPORT: NORMAL
SARS-COV-2 RNA RESP QL NAA+PROBE: NEGATIVE
SARS-COV-2 RNA RESP QL NAA+PROBE: NORMAL
SPECIMEN SOURCE: NORMAL
SPECIMEN SOURCE: NORMAL

## 2021-05-04 PROCEDURE — U0005 INFEC AGEN DETEC AMPLI PROBE: HCPCS | Performed by: SURGERY

## 2021-05-04 PROCEDURE — U0003 INFECTIOUS AGENT DETECTION BY NUCLEIC ACID (DNA OR RNA); SEVERE ACUTE RESPIRATORY SYNDROME CORONAVIRUS 2 (SARS-COV-2) (CORONAVIRUS DISEASE [COVID-19]), AMPLIFIED PROBE TECHNIQUE, MAKING USE OF HIGH THROUGHPUT TECHNOLOGIES AS DESCRIBED BY CMS-2020-01-R: HCPCS | Performed by: SURGERY

## 2021-05-06 ENCOUNTER — ANESTHESIA EVENT (OUTPATIENT)
Dept: GASTROENTEROLOGY | Facility: CLINIC | Age: 41
End: 2021-05-06
Payer: COMMERCIAL

## 2021-05-06 ASSESSMENT — LIFESTYLE VARIABLES: TOBACCO_USE: 1

## 2021-05-06 NOTE — ANESTHESIA PREPROCEDURE EVALUATION
Anesthesia Pre-Procedure Evaluation    Patient: Aliza Orr   MRN: [id number] : 1980        Preoperative Diagnosis: Rectal bleeding [K62.5]   Procedure : Procedure(s):  COLONOSCOPY     Past Medical History:   Diagnosis Date     Benign essential hypertension 3/7/2019     Genital herpes      HSIL (high grade squamous intraepithelial lesion) on Pap smear 12 colp - SHUKRI 2, 3, CIS     PMDD (premenstrual dysphoric disorder) 2014      Past Surgical History:   Procedure Laterality Date     LAPAROSCOPIC TUBAL LIGATION  2011    Procedure:LAPAROSCOPIC TUBAL LIGATION; Laparoscopic Tubal Sterlization; Surgeon:RYANNE HAWK; Location:[place] OR     LEEP TX, CERVICAL  12    SHUKRI 1,2,3     SOFT TISSUE SURGERY      Ankle surgery     SURGICAL HISTORY OF -       ankle right     TONSILLECTOMY        Allergies   Allergen Reactions     Nkda [No Known Drug Allergies]       Social History     Tobacco Use     Smoking status: Former Smoker     Packs/day: 0.50     Years: 15.00     Pack years: 7.50     Types: Cigarettes     Quit date: 2008     Years since quittin.3     Smokeless tobacco: Never Used   Substance Use Topics     Alcohol use: Yes     Comment: Few beers a week      Wt Readings from Last 1 Encounters:   21 103 kg (227 lb)        Anesthesia Evaluation   Pt has had prior anesthetic.         ROS/MED HX  ENT/Pulmonary:     (+) sleep apnea, tobacco use, Past use,     Neurologic:       Cardiovascular:     (+) hypertension-----    METS/Exercise Tolerance:     Hematologic:       Musculoskeletal:       GI/Hepatic:     (+) bowel prep,     Renal/Genitourinary:       Endo:     (+) Obesity,     Psychiatric/Substance Use:     (+) psychiatric history depression     Infectious Disease:       Malignancy:       Other:            Physical Exam    Airway        Mallampati: I   TM distance: > 3 FB   Neck ROM: full   Mouth opening: > 3 cm    Respiratory Devices and Support         Dental   no notable dental history         Cardiovascular   cardiovascular exam normal          Pulmonary   pulmonary exam normal                OUTSIDE LABS:  CBC:   Lab Results   Component Value Date    WBC 7.8 09/15/2020    WBC 9.5 03/07/2019    HGB 13.2 09/15/2020    HGB 12.9 03/07/2019    HCT 39.8 09/15/2020    HCT 38.9 03/07/2019     09/15/2020     03/07/2019     BMP:   Lab Results   Component Value Date     09/15/2020     05/20/2019    POTASSIUM 4.4 09/15/2020    POTASSIUM 4.1 05/20/2019    CHLORIDE 104 09/15/2020    CHLORIDE 104 05/20/2019    CO2 28 09/15/2020    CO2 31 05/20/2019    BUN 13 09/15/2020    BUN 11 05/20/2019    CR 0.77 09/15/2020    CR 0.74 05/20/2019    GLC 84 09/15/2020    GLC 88 05/20/2019     COAGS: No results found for: PTT, INR, FIBR  POC:   Lab Results   Component Value Date    HCG Negative 07/28/2012     HEPATIC:   Lab Results   Component Value Date    ALBUMIN 3.8 09/15/2020    PROTTOTAL 7.8 09/15/2020    ALT 30 09/15/2020    AST 22 09/15/2020    ALKPHOS 86 09/15/2020    BILITOTAL 0.2 09/15/2020     OTHER:   Lab Results   Component Value Date    WALLY 8.9 09/15/2020    TSH 2.13 09/15/2020    CRP 6.1 05/20/2019    SED 12 05/20/2019       Anesthesia Plan    ASA Status:  3   NPO Status:  NPO Appropriate    Anesthesia Type: General.   Induction: Propofol.   Maintenance: TIVA.        Consents    Anesthesia Plan(s) and associated risks, benefits, and realistic alternatives discussed. Questions answered and patient/representative(s) expressed understanding.     - Discussed with:  Patient         Postoperative Care       PONV prophylaxis: Ondansetron (or other 5HT-3), Dexamethasone or Solumedrol     Comments:                GENO Hogan CRNA

## 2021-05-07 ENCOUNTER — HOSPITAL ENCOUNTER (OUTPATIENT)
Facility: CLINIC | Age: 41
Discharge: HOME | End: 2021-05-07
Attending: SURGERY | Admitting: SURGERY
Payer: COMMERCIAL

## 2021-05-07 ENCOUNTER — ANESTHESIA (OUTPATIENT)
Dept: GASTROENTEROLOGY | Facility: CLINIC | Age: 41
End: 2021-05-07
Payer: COMMERCIAL

## 2021-05-07 VITALS
HEART RATE: 65 BPM | HEIGHT: 65 IN | BODY MASS INDEX: 37.82 KG/M2 | OXYGEN SATURATION: 98 % | SYSTOLIC BLOOD PRESSURE: 128 MMHG | WEIGHT: 227 LBS | DIASTOLIC BLOOD PRESSURE: 85 MMHG | TEMPERATURE: 98 F | RESPIRATION RATE: 16 BRPM

## 2021-05-07 LAB — COLONOSCOPY: NORMAL

## 2021-05-07 PROCEDURE — 258N000003 HC RX IP 258 OP 636: Performed by: SURGERY

## 2021-05-07 PROCEDURE — 45378 DIAGNOSTIC COLONOSCOPY: CPT | Performed by: SURGERY

## 2021-05-07 PROCEDURE — 250N000009 HC RX 250: Performed by: SURGERY

## 2021-05-07 PROCEDURE — 370N000017 HC ANESTHESIA TECHNICAL FEE, PER MIN: Performed by: SURGERY

## 2021-05-07 PROCEDURE — 250N000011 HC RX IP 250 OP 636: Performed by: NURSE ANESTHETIST, CERTIFIED REGISTERED

## 2021-05-07 RX ORDER — ONDANSETRON 2 MG/ML
4 INJECTION INTRAMUSCULAR; INTRAVENOUS
Status: DISCONTINUED | OUTPATIENT
Start: 2021-05-07 | End: 2021-05-07 | Stop reason: HOSPADM

## 2021-05-07 RX ORDER — LIDOCAINE 40 MG/G
CREAM TOPICAL
Status: DISCONTINUED | OUTPATIENT
Start: 2021-05-07 | End: 2021-05-07 | Stop reason: HOSPADM

## 2021-05-07 RX ORDER — PROPOFOL 10 MG/ML
INJECTION, EMULSION INTRAVENOUS PRN
Status: DISCONTINUED | OUTPATIENT
Start: 2021-05-07 | End: 2021-05-07

## 2021-05-07 RX ORDER — SODIUM CHLORIDE, SODIUM LACTATE, POTASSIUM CHLORIDE, CALCIUM CHLORIDE 600; 310; 30; 20 MG/100ML; MG/100ML; MG/100ML; MG/100ML
INJECTION, SOLUTION INTRAVENOUS CONTINUOUS
Status: DISCONTINUED | OUTPATIENT
Start: 2021-05-07 | End: 2021-05-07 | Stop reason: HOSPADM

## 2021-05-07 RX ADMIN — PROPOFOL 150 MG: 10 INJECTION, EMULSION INTRAVENOUS at 11:37

## 2021-05-07 RX ADMIN — PROPOFOL 150 MG: 10 INJECTION, EMULSION INTRAVENOUS at 11:38

## 2021-05-07 RX ADMIN — PROPOFOL 150 MG: 10 INJECTION, EMULSION INTRAVENOUS at 11:39

## 2021-05-07 RX ADMIN — SODIUM CHLORIDE, POTASSIUM CHLORIDE, SODIUM LACTATE AND CALCIUM CHLORIDE: 600; 310; 30; 20 INJECTION, SOLUTION INTRAVENOUS at 10:58

## 2021-05-07 RX ADMIN — LIDOCAINE HYDROCHLORIDE 1 ML: 10 INJECTION, SOLUTION EPIDURAL; INFILTRATION; INTRACAUDAL; PERINEURAL at 10:58

## 2021-05-07 RX ADMIN — PROPOFOL 150 MG: 10 INJECTION, EMULSION INTRAVENOUS at 11:40

## 2021-05-07 RX ADMIN — PROPOFOL 100 MG: 10 INJECTION, EMULSION INTRAVENOUS at 11:41

## 2021-05-07 ASSESSMENT — MIFFLIN-ST. JEOR: SCORE: 1687.61

## 2021-05-07 NOTE — H&P
"40 year old year old female here for colonoscopy for blood in stool.    Patient Active Problem List   Diagnosis     Anal fissure     LUCAS (obstructive sleep apnea)     Insomnia     BMI 38.0-38.9,adult     CARDIOVASCULAR SCREENING; LDL GOAL LESS THAN 160     Dyspareunia     Incontinence of urine     SHUKRI 3     CIS (carcinoma in situ of cervix)     Female pelvic pain     S/P LEEP     PMDD (premenstrual dysphoric disorder)     Menorrhagia     Major depression     Persistent insomnia     Benign essential hypertension     Obesity (BMI 35.0-39.9) with comorbidity (H)       Past Medical History:   Diagnosis Date     Benign essential hypertension 3/7/2019     Genital herpes      HSIL (high grade squamous intraepithelial lesion) on Pap smear 1/16/12 1/24/12 colp - SHUKRI 2, 3, CIS     PMDD (premenstrual dysphoric disorder) 9/2/2014       Past Surgical History:   Procedure Laterality Date     LAPAROSCOPIC TUBAL LIGATION  4/20/2011    Procedure:LAPAROSCOPIC TUBAL LIGATION; Laparoscopic Tubal Sterlization; Surgeon:RYANNE HAWK; Location:[place] [facility]     LEEP TX, CERVICAL  1/31/12    SHUKRI 1,2,3     SOFT TISSUE SURGERY  1997    Ankle surgery     SURGICAL HISTORY OF -   1997    ankle right     TONSILLECTOMY  2003       @[facility]@    No current outpatient medications on file.       Allergies   Allergen Reactions     Nkda [No Known Drug Allergies]        Pt reports that she quit smoking about 13 years ago. Her smoking use included cigarettes. She has a 7.50 pack-year smoking history. She has never used smokeless tobacco. She reports current alcohol use. She reports that she does not use drugs.    Exam:  /84 (BP Location: Right arm)   Pulse 75   Temp 98  F (36.7  C) (Oral)   Resp 16   Ht 1.638 m (5' 4.5\")   Wt 103 kg (227 lb)   SpO2 96%   BMI 38.36 kg/m      Awake, Alert OX3  Lungs - CTA bilaterally  CV - RRR, no murmurs, distal pulses intact  Abd - soft, non-distended, non-tender, +BS  Extr - No cyanosis or edema    A/P 40 " year old year old female in need of colonoscopy for blood in stool. Risks, benefits, alternatives, and complications were discussed including the possibility of perforation and the patient agreed to proceed.    Addi Johnson MD

## 2021-05-07 NOTE — BRIEF OP NOTE
[facility]   Brief Operative Note    Pre-operative diagnosis: Rectal bleeding [K62.5]   Post-operative diagnosis anal fissure, otherwise normal     Procedure: Procedure(s):  COLONOSCOPY   Surgeon(s): Surgeon(s) and Role:     * Addi Johnson MD - Primary   Estimated blood loss: * No values recorded between 5/7/2021 11:39 AM and 5/7/2021 11:57 AM *    Specimens: * No specimens in log *   Findings: 1. Anal fissure palpated, some bleeding noted  2. Colon otherwise normal

## 2021-05-07 NOTE — ANESTHESIA POSTPROCEDURE EVALUATION
Patient: Aliza Orr    Procedure(s):  COLONOSCOPY    Diagnosis:Rectal bleeding [K62.5]  Diagnosis Additional Information: No value filed.    Anesthesia Type:  General    Note:  Disposition: Outpatient   Postop Pain Control: Uneventful            Sign Out: Well controlled pain   PONV: No   Neuro/Psych: Uneventful            Sign Out: Acceptable/Baseline neuro status   Airway/Respiratory: Uneventful            Sign Out: Acceptable/Baseline resp. status   CV/Hemodynamics: Uneventful            Sign Out: Acceptable CV status; No obvious hypovolemia; No obvious fluid overload   Other NRE: NONE   DID A NON-ROUTINE EVENT OCCUR? No           Last vitals:  Vitals:    05/07/21 1034   BP: 124/84   Pulse: 75   Resp: 16   Temp: 36.7  C (98  F)   SpO2: 96%       Last vitals prior to Anesthesia Care Transfer:  CRNA VITALS  5/7/2021 1126 - 5/7/2021 1156      5/7/2021             Pulse:  72    Ht Rate:  72    SpO2:  98 %          Electronically Signed By: GENO Lopez CRNA  May 7, 2021  11:56 AM

## 2021-05-07 NOTE — ANESTHESIA CARE TRANSFER NOTE
Patient: Aliza Orr    Procedure(s):  COLONOSCOPY    Diagnosis: Rectal bleeding [K62.5]  Diagnosis Additional Information: No value filed.    Anesthesia Type:   General     Note:    Oropharynx: oropharynx clear of all foreign objects  Level of Consciousness: awake  Oxygen Supplementation: room air    Independent Airway: airway patency satisfactory and stable      Report to RN Given: handoff report given  Patient transferred to: Phase II    Handoff Report: Identifed the Patient, Identified the Reponsible Provider, Reviewed the pertinent medical history, Discussed the surgical course, Reviewed Intra-OP anesthesia mangement and issues during anesthesia, Set expectations for post-procedure period and Allowed opportunity for questions and acknowledgement of understanding      Vitals: (Last set prior to Anesthesia Care Transfer)  CRNA VITALS  5/7/2021 1126 - 5/7/2021 1156      5/7/2021             Pulse:  72    Ht Rate:  72    SpO2:  98 %        Electronically Signed By: GENO Lopez CRNA  May 7, 2021  11:56 AM

## 2021-05-17 ENCOUNTER — TELEPHONE (OUTPATIENT)
Dept: SURGERY | Facility: CLINIC | Age: 41
End: 2021-05-17

## 2021-05-17 NOTE — TELEPHONE ENCOUNTER
[facility]    Denise Behrendt  [facility] CSS

## 2021-05-24 ENCOUNTER — OFFICE VISIT (OUTPATIENT)
Dept: SURGERY | Facility: CLINIC | Age: 41
End: 2021-05-24
Payer: COMMERCIAL

## 2021-05-24 VITALS
BODY MASS INDEX: 37.82 KG/M2 | HEART RATE: 80 BPM | HEIGHT: 65 IN | WEIGHT: 227 LBS | DIASTOLIC BLOOD PRESSURE: 79 MMHG | TEMPERATURE: 98.2 F | SYSTOLIC BLOOD PRESSURE: 114 MMHG

## 2021-05-24 DIAGNOSIS — K60.2 ANAL FISSURE: Primary | ICD-10-CM

## 2021-05-24 PROCEDURE — 46505 CHEMODENERVATION ANAL MUSC: CPT | Performed by: SURGERY

## 2021-05-24 ASSESSMENT — MIFFLIN-ST. JEOR: SCORE: 1687.61

## 2021-05-24 NOTE — PATIENT INSTRUCTIONS
Per physician instructions      If you have questions or concerns on any instructions given to you by your provider today or if you need to schedule an appointment, you can reach us at [phone].

## 2021-05-24 NOTE — LETTER
5/24/2021         RE: Aliza Orr  [address] [facility] [address]        Dear Colleague,    Thank you for referring your patient, Aliza Orr, to the [facility]. Please see a copy of my visit note below.    Aliza returns to the surgical clinic for Botox injection.  She has not improved with judicious use of the nifedipine ointment.  Given that every try so the next step would be Botox injection.  This has been approved by her insurance company.    After thorough discussion of the risks and benefits she was brought to the procedure room and placed on the table in the left lateral decubitus position.  A digital exam revealed persistence of the fissure in the posterior midline.    The internal sphincter was injected with 40 units of Botox on either side of the midline by direct palpation of the internal sphincter.  She tolerated the procedure well.  She understands the potential risks.  I told her that if this is going to be helpful we should see a response within a week or 2.  I have asked her to return in 2 weeks for follow-up and she can cancel that appointment if she has improvement in her symptoms.    Addi Johnson MD FACS      Again, thank you for allowing me to participate in the care of your patient.        Sincerely,        Addi Johnson MD

## 2021-05-24 NOTE — PROGRESS NOTES
Aliza returns to the surgical clinic for Botox injection.  She has not improved with judicious use of the nifedipine ointment.  Given that every try so the next step would be Botox injection.  This has been approved by her insurance company.    After thorough discussion of the risks and benefits she was brought to the procedure room and placed on the table in the left lateral decubitus position.  A digital exam revealed persistence of the fissure in the posterior midline.    The internal sphincter was injected with 40 units of Botox on either side of the midline by direct palpation of the internal sphincter.  She tolerated the procedure well.  She understands the potential risks.  I told her that if this is going to be helpful we should see a response within a week or 2.  I have asked her to return in 2 weeks for follow-up and she can cancel that appointment if she has improvement in her symptoms.    Addi Johnson MD FACS

## 2021-05-24 NOTE — NURSING NOTE
"Initial /79 (BP Location: Right arm, Patient Position: Sitting, Cuff Size: Adult Large)   Pulse 80   Temp 98.2  F (36.8  C) (Tympanic)   Ht 1.638 m (5' 4.5\")   Wt 103 kg (227 lb)   BMI 38.36 kg/m   Estimated body mass index is 38.36 kg/m  as calculated from the following:    Height as of this encounter: 1.638 m (5' 4.5\").    Weight as of this encounter: 103 kg (227 lb). .    Jessika Stanford CMA    "

## 2021-06-15 ENCOUNTER — MYC MEDICAL ADVICE (OUTPATIENT)
Dept: FAMILY MEDICINE | Facility: CLINIC | Age: 41
End: 2021-06-15

## 2021-07-18 DIAGNOSIS — G47.00 PERSISTENT INSOMNIA: ICD-10-CM

## 2021-07-19 RX ORDER — TEMAZEPAM 30 MG
CAPSULE ORAL
Qty: 30 CAPSULE | Refills: 2 | Status: SHIPPED | OUTPATIENT
Start: 2021-07-19 | End: 2021-10-18

## 2021-09-13 ENCOUNTER — MYC MEDICAL ADVICE (OUTPATIENT)
Dept: SURGERY | Facility: CLINIC | Age: 41
End: 2021-09-13

## 2021-09-18 ENCOUNTER — HEALTH MAINTENANCE LETTER (OUTPATIENT)
Age: 41
End: 2021-09-18

## 2021-10-17 DIAGNOSIS — G47.00 PERSISTENT INSOMNIA: ICD-10-CM

## 2021-10-18 RX ORDER — TEMAZEPAM 30 MG
CAPSULE ORAL
Qty: 30 CAPSULE | Refills: 0 | Status: SHIPPED | OUTPATIENT
Start: 2021-10-18 | End: 2021-11-18

## 2021-10-18 NOTE — TELEPHONE ENCOUNTER
Requested Prescriptions   Pending Prescriptions Disp Refills     temazepam (RESTORIL) 30 MG capsule [Pharmacy Med Name: TEMAZEPAM 30MG CAPS] 30 capsule 2     Sig: TAKE ONE CAPSULE BY MOUTH NIGHTLY AS NEEDED FOR SLEEP       There is no refill protocol information for this order        Last Written Prescription Date:  7/19/21  Last Fill Quantity: 30,  # refills: 2   Last office visit: 9/15/2020 with prescribing provider:     Future Office Visit:

## 2021-10-19 ENCOUNTER — MYC MEDICAL ADVICE (OUTPATIENT)
Dept: FAMILY MEDICINE | Facility: CLINIC | Age: 41
End: 2021-10-19

## 2021-11-13 ENCOUNTER — HEALTH MAINTENANCE LETTER (OUTPATIENT)
Age: 41
End: 2021-11-13

## 2021-11-18 ENCOUNTER — OFFICE VISIT (OUTPATIENT)
Dept: FAMILY MEDICINE | Facility: CLINIC | Age: 41
End: 2021-11-18
Payer: COMMERCIAL

## 2021-11-18 ENCOUNTER — TELEPHONE (OUTPATIENT)
Dept: FAMILY MEDICINE | Facility: CLINIC | Age: 41
End: 2021-11-18

## 2021-11-18 VITALS
BODY MASS INDEX: 41.32 KG/M2 | RESPIRATION RATE: 20 BRPM | HEIGHT: 64 IN | TEMPERATURE: 99.1 F | DIASTOLIC BLOOD PRESSURE: 74 MMHG | SYSTOLIC BLOOD PRESSURE: 122 MMHG | OXYGEN SATURATION: 98 % | WEIGHT: 242 LBS | HEART RATE: 88 BPM

## 2021-11-18 DIAGNOSIS — I10 BENIGN ESSENTIAL HYPERTENSION: ICD-10-CM

## 2021-11-18 DIAGNOSIS — E66.01 MORBID OBESITY (H): Primary | ICD-10-CM

## 2021-11-18 DIAGNOSIS — Z23 NEED FOR PROPHYLACTIC VACCINATION AND INOCULATION AGAINST INFLUENZA: ICD-10-CM

## 2021-11-18 DIAGNOSIS — Z23 HIGH PRIORITY FOR 2019-NCOV VACCINE: ICD-10-CM

## 2021-11-18 DIAGNOSIS — G47.00 PERSISTENT INSOMNIA: ICD-10-CM

## 2021-11-18 LAB
ALBUMIN SERPL-MCNC: 3.5 G/DL (ref 3.4–5)
ALP SERPL-CCNC: 84 U/L (ref 40–150)
ALT SERPL W P-5'-P-CCNC: 35 U/L (ref 0–50)
ANION GAP SERPL CALCULATED.3IONS-SCNC: 6 MMOL/L (ref 3–14)
AST SERPL W P-5'-P-CCNC: 18 U/L (ref 0–45)
BASOPHILS # BLD AUTO: 0.1 10E3/UL (ref 0–0.2)
BASOPHILS NFR BLD AUTO: 1 %
BILIRUB SERPL-MCNC: 0.2 MG/DL (ref 0.2–1.3)
BUN SERPL-MCNC: 12 MG/DL (ref 7–30)
CALCIUM SERPL-MCNC: 8.8 MG/DL (ref 8.5–10.1)
CHLORIDE BLD-SCNC: 106 MMOL/L (ref 94–109)
CO2 SERPL-SCNC: 26 MMOL/L (ref 20–32)
CREAT SERPL-MCNC: 0.79 MG/DL (ref 0.52–1.04)
EOSINOPHIL # BLD AUTO: 0.2 10E3/UL (ref 0–0.7)
EOSINOPHIL NFR BLD AUTO: 2 %
ERYTHROCYTE [DISTWIDTH] IN BLOOD BY AUTOMATED COUNT: 12.6 % (ref 10–15)
GFR SERPL CREATININE-BSD FRML MDRD: >90 ML/MIN/1.73M2
GLUCOSE BLD-MCNC: 95 MG/DL (ref 70–99)
HCT VFR BLD AUTO: 38.1 % (ref 35–47)
HGB BLD-MCNC: 12.7 G/DL (ref 11.7–15.7)
LYMPHOCYTES # BLD AUTO: 2.3 10E3/UL (ref 0.8–5.3)
LYMPHOCYTES NFR BLD AUTO: 27 %
MCH RBC QN AUTO: 29.6 PG (ref 26.5–33)
MCHC RBC AUTO-ENTMCNC: 33.3 G/DL (ref 31.5–36.5)
MCV RBC AUTO: 89 FL (ref 78–100)
MONOCYTES # BLD AUTO: 0.8 10E3/UL (ref 0–1.3)
MONOCYTES NFR BLD AUTO: 10 %
NEUTROPHILS # BLD AUTO: 5.3 10E3/UL (ref 1.6–8.3)
NEUTROPHILS NFR BLD AUTO: 61 %
PLATELET # BLD AUTO: 304 10E3/UL (ref 150–450)
POTASSIUM BLD-SCNC: 4 MMOL/L (ref 3.4–5.3)
PROT SERPL-MCNC: 7.6 G/DL (ref 6.8–8.8)
RBC # BLD AUTO: 4.29 10E6/UL (ref 3.8–5.2)
SODIUM SERPL-SCNC: 138 MMOL/L (ref 133–144)
WBC # BLD AUTO: 8.7 10E3/UL (ref 4–11)

## 2021-11-18 PROCEDURE — 90686 IIV4 VACC NO PRSV 0.5 ML IM: CPT | Performed by: FAMILY MEDICINE

## 2021-11-18 PROCEDURE — 91300 COVID-19,PF,PFIZER (12+ YRS): CPT | Performed by: FAMILY MEDICINE

## 2021-11-18 PROCEDURE — 0004A COVID-19,PF,PFIZER (12+ YRS): CPT | Performed by: FAMILY MEDICINE

## 2021-11-18 PROCEDURE — 36415 COLL VENOUS BLD VENIPUNCTURE: CPT | Performed by: FAMILY MEDICINE

## 2021-11-18 PROCEDURE — 90471 IMMUNIZATION ADMIN: CPT | Performed by: FAMILY MEDICINE

## 2021-11-18 PROCEDURE — 80053 COMPREHEN METABOLIC PANEL: CPT | Performed by: FAMILY MEDICINE

## 2021-11-18 PROCEDURE — 99214 OFFICE O/P EST MOD 30 MIN: CPT | Mod: 25 | Performed by: FAMILY MEDICINE

## 2021-11-18 PROCEDURE — 85025 COMPLETE CBC W/AUTO DIFF WBC: CPT | Performed by: FAMILY MEDICINE

## 2021-11-18 RX ORDER — TEMAZEPAM 30 MG
30 CAPSULE ORAL
Qty: 30 CAPSULE | Refills: 3 | Status: SHIPPED | OUTPATIENT
Start: 2021-11-18 | End: 2022-03-16

## 2021-11-18 RX ORDER — LOSARTAN POTASSIUM 50 MG/1
50 TABLET ORAL DAILY
Qty: 90 TABLET | Refills: 3 | Status: SHIPPED | OUTPATIENT
Start: 2021-11-18 | End: 2022-11-29

## 2021-11-18 RX ORDER — SEMAGLUTIDE 0.25 MG/.5ML
0.25 INJECTION, SOLUTION SUBCUTANEOUS
Qty: 0.5 ML | Refills: 3 | Status: SHIPPED | OUTPATIENT
Start: 2021-11-18 | End: 2023-03-30

## 2021-11-18 ASSESSMENT — PATIENT HEALTH QUESTIONNAIRE - PHQ9
SUM OF ALL RESPONSES TO PHQ QUESTIONS 1-9: 4
10. IF YOU CHECKED OFF ANY PROBLEMS, HOW DIFFICULT HAVE THESE PROBLEMS MADE IT FOR YOU TO DO YOUR WORK, TAKE CARE OF THINGS AT HOME, OR GET ALONG WITH OTHER PEOPLE: NOT DIFFICULT AT ALL
SUM OF ALL RESPONSES TO PHQ QUESTIONS 1-9: 4

## 2021-11-18 ASSESSMENT — ANXIETY QUESTIONNAIRES
7. FEELING AFRAID AS IF SOMETHING AWFUL MIGHT HAPPEN: NOT AT ALL
8. IF YOU CHECKED OFF ANY PROBLEMS, HOW DIFFICULT HAVE THESE MADE IT FOR YOU TO DO YOUR WORK, TAKE CARE OF THINGS AT HOME, OR GET ALONG WITH OTHER PEOPLE?: NOT DIFFICULT AT ALL
2. NOT BEING ABLE TO STOP OR CONTROL WORRYING: NOT AT ALL
3. WORRYING TOO MUCH ABOUT DIFFERENT THINGS: NOT AT ALL
6. BECOMING EASILY ANNOYED OR IRRITABLE: NOT AT ALL
7. FEELING AFRAID AS IF SOMETHING AWFUL MIGHT HAPPEN: NOT AT ALL
4. TROUBLE RELAXING: NOT AT ALL
1. FEELING NERVOUS, ANXIOUS, OR ON EDGE: NOT AT ALL
5. BEING SO RESTLESS THAT IT IS HARD TO SIT STILL: NOT AT ALL
GAD7 TOTAL SCORE: 0

## 2021-11-18 ASSESSMENT — MIFFLIN-ST. JEOR: SCORE: 1750.45

## 2021-11-18 ASSESSMENT — PAIN SCALES - GENERAL: PAINLEVEL: EXTREME PAIN (8)

## 2021-11-18 NOTE — PROGRESS NOTES
"  Assessment & Plan     Benign essential hypertension  Well controlled  - losartan (COZAAR) 50 MG tablet; Take 1 tablet (50 mg) by mouth daily  - Comprehensive metabolic panel (BMP + Alb, Alk Phos, ALT, AST, Total. Bili, TP); Future  - CBC with platelets and differential; Future  - Comprehensive metabolic panel (BMP + Alb, Alk Phos, ALT, AST, Total. Bili, TP)  - CBC with platelets and differential    Persistent insomnia  Doing well   - temazepam (RESTORIL) 30 MG capsule; Take 1 capsule (30 mg) by mouth nightly as needed for sleep    Morbid obesity (H)  Would like help  Can see if insurance covers Wegovy  See weight clinic  - Semaglutide-Weight Management (WEGOVY) 0.25 MG/0.5ML SOAJ; Inject 0.25 mg Subcutaneous every 7 days  - Comprehensive Weight Management; Future    Need for prophylactic vaccination and inoculation against influenza  - INFLUENZA VACCINE IM > 6 MONTHS VALENT IIV4 (AFLURIA/FLUZONE)  - COVID-19,,PFIZER (12+ Yrs PURPLE LABEL)    High priority for 2019-nCoV vaccine  Shot given       BMI:   Estimated body mass index is 40.91 kg/m  as calculated from the following:    Height as of this encounter: 1.638 m (5' 4.49\").    Weight as of this encounter: 109.8 kg (242 lb).   Weight management plan: Patient referred to endocrine and/or weight management specialty      Return in about 6 months (around 5/18/2022).    Barb Smith MD  [facility]    Tere Abrams is a 41 year old who presents for the following health issues     HPI     Pt presents today for a refill of her sleep medication. When taking this medication pt states she is able to fall asleep, helps her get back to sleep.    hypertension   On losartan  No chest pain or shortness of breath   Due for labs  BP Readings from Last 6 Encounters:   11/18/21 122/74   05/24/21 114/79   05/07/21 128/85   04/26/21 134/87   10/05/20 115/71   09/30/20 134/82        Anal fissure  Got much worse after colonoscopy  Did not resolve " "with nifedipine gel  Finally had botox injections and those worked well    Obesity  She just keeps gaining weight and would like to try something  Wt Readings from Last 5 Encounters:   11/18/21 109.8 kg (242 lb)   05/24/21 103 kg (227 lb)   05/07/21 103 kg (227 lb)   04/26/21 103 kg (227 lb)   10/05/20 103 kg (227 lb)      Review of Systems   No       Objective    /74 (BP Location: Right arm, Patient Position: Sitting, Cuff Size: Adult Large)   Pulse 88   Temp 99.1  F (37.3  C) (Tympanic)   Resp 20   Ht 1.638 m (5' 4.49\")   Wt 109.8 kg (242 lb)   SpO2 98%   BMI 40.91 kg/m    Body mass index is 40.91 kg/m .  Physical Exam   GENERAL: healthy, alert and no distress  NECK: no adenopathy, no asymmetry, masses, or scars and thyroid normal to palpation  RESP: lungs clear to auscultation - no rales, rhonchi or wheezes  CV: regular rate and rhythm, normal S1 S2, no S3 or S4, no murmur, click or rub, no peripheral edema and peripheral pulses strong  ABDOMEN: soft, obese, nontender, no hepatosplenomegaly, no masses and bowel sounds normal  MS: no gross musculoskeletal defects noted, no edema          Answers for HPI/ROS submitted by the patient on 11/18/2021  If you checked off any problems, how difficult have these problems made it for you to do your work, take care of things at home, or get along with other people?: Not difficult at all  PHQ9 TOTAL SCORE: 4  ALBIN 7 TOTAL SCORE: 0      "

## 2021-11-19 ASSESSMENT — ANXIETY QUESTIONNAIRES: GAD7 TOTAL SCORE: 0

## 2021-11-19 ASSESSMENT — PATIENT HEALTH QUESTIONNAIRE - PHQ9: SUM OF ALL RESPONSES TO PHQ QUESTIONS 1-9: 4

## 2021-11-19 NOTE — TELEPHONE ENCOUNTER
"Prior Authorization Retail Medication Request    Medication/Dose: wegovy 0.25mg/0.5 ml - NDC not covered, new to market fda approved drug    ICD code (if different than what is on RX):  Morbid obesity (H) [E66.01]  - Primary   Previously Tried and Failed:    Rationale: Morbid obesity (H)  Would like help  Can see if insurance covers Wegovy  See weight clinic  - Semaglutide-Weight Management (WEGOVY) 0.25 MG/0.5ML SOAJ; Inject 0.25 mg Subcutaneous every 7 days  - Comprehensive Weight Management; Future   Estimated body mass index is 40.91 kg/m  as calculated from the following:    Height as of this encounter: 1.638 m (5' 4.49\").    Weight as of this encounter: 109.8 kg (242 lb).   Weight management plan: Patient referred to endocrine and/or weight management specialty       Insurance Name:  [employer]  Insurance ID:  [id number]  Insurance phone# [phone]      Pharmacy Information (if different than what is on RX)  Name:   [facility]  Phone:  [phone]    Thank You!    Jerman Moncada CPhT  [facility]      "

## 2021-11-22 NOTE — TELEPHONE ENCOUNTER
Central Prior Authorization Team   Phone: [phone]      PA Initiation    Medication: wegovy 0.25mg/0.5 ml - INITIATED  Insurance Company: [employer] [place] - Phone [phone] Fax [phone]  Pharmacy Filling the Rx: [place], [place] - [address] [place] [address]  Filling Pharmacy Phone: [phone]  Filling Pharmacy Fax: [phone]  Start Date: 11/22/2021

## 2021-11-24 NOTE — TELEPHONE ENCOUNTER
PRIOR AUTHORIZATION DENIED    Medication: wegovy 0.25mg/0.5 ml - denied    Denial Date: 11/23/2021    Denial Rational: medication is a plan exclusion - no weight loss meds are covered by INS    Appeal Information: N/A

## 2022-01-16 ENCOUNTER — MYC MEDICAL ADVICE (OUTPATIENT)
Dept: FAMILY MEDICINE | Facility: CLINIC | Age: 42
End: 2022-01-16
Payer: COMMERCIAL

## 2022-01-16 DIAGNOSIS — I10 BENIGN ESSENTIAL HYPERTENSION: ICD-10-CM

## 2022-01-17 RX ORDER — CHLORTHALIDONE 25 MG/1
25 TABLET ORAL DAILY
Qty: 30 TABLET | Refills: 1 | Status: SHIPPED | OUTPATIENT
Start: 2022-01-17 | End: 2022-01-17

## 2022-02-14 ENCOUNTER — ANCILLARY PROCEDURE (OUTPATIENT)
Dept: MAMMOGRAPHY | Facility: CLINIC | Age: 42
End: 2022-02-14
Attending: FAMILY MEDICINE
Payer: COMMERCIAL

## 2022-02-14 DIAGNOSIS — Z12.31 VISIT FOR SCREENING MAMMOGRAM: ICD-10-CM

## 2022-02-14 PROCEDURE — 77063 BREAST TOMOSYNTHESIS BI: CPT | Mod: TC | Performed by: RADIOLOGY

## 2022-02-14 PROCEDURE — 77067 SCR MAMMO BI INCL CAD: CPT | Mod: TC | Performed by: RADIOLOGY

## 2022-03-14 DIAGNOSIS — G47.00 PERSISTENT INSOMNIA: ICD-10-CM

## 2022-03-16 ENCOUNTER — MYC MEDICAL ADVICE (OUTPATIENT)
Dept: FAMILY MEDICINE | Facility: CLINIC | Age: 42
End: 2022-03-16
Payer: COMMERCIAL

## 2022-03-16 DIAGNOSIS — G47.00 PERSISTENT INSOMNIA: ICD-10-CM

## 2022-03-16 RX ORDER — TEMAZEPAM 30 MG
30 CAPSULE ORAL
Qty: 30 CAPSULE | Refills: 3 | OUTPATIENT
Start: 2022-03-16

## 2022-03-16 RX ORDER — TEMAZEPAM 30 MG
30 CAPSULE ORAL
Qty: 30 CAPSULE | Refills: 3 | Status: SHIPPED | OUTPATIENT
Start: 2022-03-16 | End: 2022-07-14

## 2022-03-16 NOTE — TELEPHONE ENCOUNTER
Routing refill request to provider for review/approval because:    Drug not on the [facility] refill protocol Last Written Prescription Date:  11/18/21  Last Fill Quantity: 30,  # refills: 3   Last office visit:11/18/21  Future Office Visit:  none

## 2022-03-16 NOTE — TELEPHONE ENCOUNTER
Routing refill request to provider for review/approval because:  Drug not on the [facility] refill protocol   Last Written Prescription Date:  11/18/21  Last Fill Quantity: 30,  # refills: 0   Last office visit: 11/18/2021 with prescribing provider:  Dr Tidwell   Future Office Visit:  None.  Yvette Bullard RN

## 2022-07-13 DIAGNOSIS — G47.00 PERSISTENT INSOMNIA: ICD-10-CM

## 2022-07-14 RX ORDER — TEMAZEPAM 30 MG
30 CAPSULE ORAL
Qty: 30 CAPSULE | Refills: 3 | Status: SHIPPED | OUTPATIENT
Start: 2022-07-14 | End: 2022-11-15

## 2022-07-14 NOTE — TELEPHONE ENCOUNTER
Requested Prescriptions   Pending Prescriptions Disp Refills     temazepam (RESTORIL) 30 MG capsule [Pharmacy Med Name: TEMAZEPAM 30MG CAPS] 30 capsule 3     Sig: TAKE 1 CAPSULE (30 MG) BY MOUTH NIGHTLY AS NEEDED FOR SLEEP       There is no refill protocol information for this order        Last Written Prescription Date:  3/16/22  Last Fill Quantity: 30,  # refills: 3   Last office visit: 11/18/2021 with prescribing provider:     Future Office Visit:

## 2022-11-15 DIAGNOSIS — G47.00 PERSISTENT INSOMNIA: ICD-10-CM

## 2022-11-15 RX ORDER — TEMAZEPAM 30 MG
30 CAPSULE ORAL
Qty: 30 CAPSULE | Refills: 3 | Status: SHIPPED | OUTPATIENT
Start: 2022-11-15 | End: 2023-03-13

## 2022-11-19 ENCOUNTER — HEALTH MAINTENANCE LETTER (OUTPATIENT)
Age: 42
End: 2022-11-19

## 2022-11-29 DIAGNOSIS — I10 BENIGN ESSENTIAL HYPERTENSION: ICD-10-CM

## 2022-11-29 RX ORDER — LOSARTAN POTASSIUM 50 MG/1
50 TABLET ORAL DAILY
Qty: 90 TABLET | Refills: 0 | Status: SHIPPED | OUTPATIENT
Start: 2022-11-29 | End: 2023-02-23

## 2023-02-23 ENCOUNTER — MYC MEDICAL ADVICE (OUTPATIENT)
Dept: FAMILY MEDICINE | Facility: CLINIC | Age: 43
End: 2023-02-23

## 2023-02-23 DIAGNOSIS — I10 BENIGN ESSENTIAL HYPERTENSION: ICD-10-CM

## 2023-02-23 RX ORDER — LOSARTAN POTASSIUM 50 MG/1
50 TABLET ORAL DAILY
Qty: 90 TABLET | Refills: 0 | Status: SHIPPED | OUTPATIENT
Start: 2023-02-23 | End: 2023-03-30

## 2023-02-23 NOTE — TELEPHONE ENCOUNTER
"Routing refill request to provider for review/approval because:  Patient needs to be seen because it has been more than 1 year since last office visit. Last office visit with prescriber was 11/18/21. Celect message sent to patient requesting she schedule an appointment.  Requested Prescriptions   Pending Prescriptions Disp Refills    losartan (COZAAR) 50 MG tablet [Pharmacy Med Name: LOSARTAN POTASSIUM 50MG TABS] 90 tablet 0     Sig: TAKE 1 TABLET (50 MG) BY MOUTH DAILY       Angiotensin-II Receptors Failed - 2/23/2023  5:01 AM        Failed - Last blood pressure under 140/90 in past 12 months     BP Readings from Last 3 Encounters:   11/18/21 122/74   05/24/21 114/79   05/07/21 128/85                 Failed - Recent (12 mo) or future (30 days) visit within the authorizing provider's specialty     Patient has had an office visit with the authorizing provider or a provider within the authorizing providers department within the previous 12 mos or has a future within next 30 days. See \"Patient Info\" tab in inbasket, or \"Choose Columns\" in Meds & Orders section of the refill encounter.              Failed - Normal serum creatinine on file in past 12 months     Recent Labs   Lab Test 11/18/21  1725   CR 0.79       Ok to refill medication if creatinine is low          Failed - Normal serum potassium on file in past 12 months     Recent Labs   Lab Test 11/18/21  1725   POTASSIUM 4.0                    Passed - Medication is active on med list        Passed - Patient is age 18 or older        Passed - No active pregnancy on record        Passed - No positive pregnancy test in past 12 months          Julie Behrendt RN       "

## 2023-03-12 ENCOUNTER — MYC MEDICAL ADVICE (OUTPATIENT)
Dept: FAMILY MEDICINE | Facility: CLINIC | Age: 43
End: 2023-03-12

## 2023-03-12 DIAGNOSIS — G47.00 PERSISTENT INSOMNIA: ICD-10-CM

## 2023-03-13 RX ORDER — TEMAZEPAM 30 MG
30 CAPSULE ORAL
Qty: 30 CAPSULE | Refills: 0 | Status: SHIPPED | OUTPATIENT
Start: 2023-03-13 | End: 2023-03-30

## 2023-03-23 ASSESSMENT — ENCOUNTER SYMPTOMS
CHILLS: 0
PALPITATIONS: 0
DYSURIA: 0
SORE THROAT: 0
EYE PAIN: 0
DIARRHEA: 0
NAUSEA: 0
DIZZINESS: 0
PARESTHESIAS: 0
SHORTNESS OF BREATH: 0
WEAKNESS: 0
COUGH: 0
BREAST MASS: 0
NERVOUS/ANXIOUS: 0
JOINT SWELLING: 0
HEADACHES: 0
CONSTIPATION: 0
FEVER: 0
ABDOMINAL PAIN: 0
ARTHRALGIAS: 1
HEMATURIA: 0
HEARTBURN: 0
HEMATOCHEZIA: 0
FREQUENCY: 0
MYALGIAS: 0

## 2023-03-30 ENCOUNTER — OFFICE VISIT (OUTPATIENT)
Dept: FAMILY MEDICINE | Facility: CLINIC | Age: 43
End: 2023-03-30
Payer: COMMERCIAL

## 2023-03-30 VITALS
WEIGHT: 230 LBS | DIASTOLIC BLOOD PRESSURE: 80 MMHG | RESPIRATION RATE: 20 BRPM | SYSTOLIC BLOOD PRESSURE: 122 MMHG | OXYGEN SATURATION: 99 % | HEIGHT: 65 IN | TEMPERATURE: 97.6 F | BODY MASS INDEX: 38.32 KG/M2 | HEART RATE: 88 BPM

## 2023-03-30 DIAGNOSIS — Z12.4 CERVICAL CANCER SCREENING: ICD-10-CM

## 2023-03-30 DIAGNOSIS — M70.61 TROCHANTERIC BURSITIS OF BOTH HIPS: ICD-10-CM

## 2023-03-30 DIAGNOSIS — Z98.890 S/P LEEP: ICD-10-CM

## 2023-03-30 DIAGNOSIS — E66.01 MORBID OBESITY (H): ICD-10-CM

## 2023-03-30 DIAGNOSIS — G47.00 PERSISTENT INSOMNIA: ICD-10-CM

## 2023-03-30 DIAGNOSIS — Z13.6 CARDIOVASCULAR SCREENING; LDL GOAL LESS THAN 160: ICD-10-CM

## 2023-03-30 DIAGNOSIS — M70.62 TROCHANTERIC BURSITIS OF BOTH HIPS: ICD-10-CM

## 2023-03-30 DIAGNOSIS — I10 BENIGN ESSENTIAL HYPERTENSION: ICD-10-CM

## 2023-03-30 DIAGNOSIS — Z00.00 ROUTINE GENERAL MEDICAL EXAMINATION AT A HEALTH CARE FACILITY: Primary | ICD-10-CM

## 2023-03-30 PROBLEM — F32.1 CURRENT MODERATE EPISODE OF MAJOR DEPRESSIVE DISORDER, UNSPECIFIED WHETHER RECURRENT (H): Status: RESOLVED | Noted: 2023-03-30 | Resolved: 2023-03-30

## 2023-03-30 PROBLEM — F32.1 CURRENT MODERATE EPISODE OF MAJOR DEPRESSIVE DISORDER, UNSPECIFIED WHETHER RECURRENT (H): Status: ACTIVE | Noted: 2023-03-30

## 2023-03-30 LAB
ALBUMIN SERPL BCG-MCNC: 4.4 G/DL (ref 3.5–5.2)
ALP SERPL-CCNC: 82 U/L (ref 35–104)
ALT SERPL W P-5'-P-CCNC: 28 U/L (ref 10–35)
ANION GAP SERPL CALCULATED.3IONS-SCNC: 10 MMOL/L (ref 7–15)
AST SERPL W P-5'-P-CCNC: 24 U/L (ref 10–35)
BASOPHILS # BLD AUTO: 0.1 10E3/UL (ref 0–0.2)
BASOPHILS NFR BLD AUTO: 1 %
BILIRUB SERPL-MCNC: 0.2 MG/DL
BUN SERPL-MCNC: 12.7 MG/DL (ref 6–20)
CALCIUM SERPL-MCNC: 9.2 MG/DL (ref 8.6–10)
CHLORIDE SERPL-SCNC: 101 MMOL/L (ref 98–107)
CHOLEST SERPL-MCNC: 178 MG/DL
CREAT SERPL-MCNC: 0.77 MG/DL (ref 0.51–0.95)
DEPRECATED HCO3 PLAS-SCNC: 28 MMOL/L (ref 22–29)
EOSINOPHIL # BLD AUTO: 0.2 10E3/UL (ref 0–0.7)
EOSINOPHIL NFR BLD AUTO: 2 %
ERYTHROCYTE [DISTWIDTH] IN BLOOD BY AUTOMATED COUNT: 12.4 % (ref 10–15)
GFR SERPL CREATININE-BSD FRML MDRD: >90 ML/MIN/1.73M2
GLUCOSE SERPL-MCNC: 98 MG/DL (ref 70–99)
HCT VFR BLD AUTO: 37.4 % (ref 35–47)
HDLC SERPL-MCNC: 72 MG/DL
HGB BLD-MCNC: 12.2 G/DL (ref 11.7–15.7)
IMM GRANULOCYTES # BLD: 0 10E3/UL
IMM GRANULOCYTES NFR BLD: 0 %
LDLC SERPL CALC-MCNC: 74 MG/DL
LYMPHOCYTES # BLD AUTO: 2.1 10E3/UL (ref 0.8–5.3)
LYMPHOCYTES NFR BLD AUTO: 24 %
MCH RBC QN AUTO: 29 PG (ref 26.5–33)
MCHC RBC AUTO-ENTMCNC: 32.6 G/DL (ref 31.5–36.5)
MCV RBC AUTO: 89 FL (ref 78–100)
MONOCYTES # BLD AUTO: 0.6 10E3/UL (ref 0–1.3)
MONOCYTES NFR BLD AUTO: 7 %
NEUTROPHILS # BLD AUTO: 5.9 10E3/UL (ref 1.6–8.3)
NEUTROPHILS NFR BLD AUTO: 67 %
NONHDLC SERPL-MCNC: 106 MG/DL
PLATELET # BLD AUTO: 301 10E3/UL (ref 150–450)
POTASSIUM SERPL-SCNC: 4.2 MMOL/L (ref 3.4–5.3)
PROT SERPL-MCNC: 7.1 G/DL (ref 6.4–8.3)
RBC # BLD AUTO: 4.2 10E6/UL (ref 3.8–5.2)
SODIUM SERPL-SCNC: 139 MMOL/L (ref 136–145)
TRIGL SERPL-MCNC: 160 MG/DL
TSH SERPL DL<=0.005 MIU/L-ACNC: 1.95 UIU/ML (ref 0.3–4.2)
WBC # BLD AUTO: 8.8 10E3/UL (ref 4–11)

## 2023-03-30 PROCEDURE — 80050 GENERAL HEALTH PANEL: CPT | Performed by: FAMILY MEDICINE

## 2023-03-30 PROCEDURE — 87624 HPV HI-RISK TYP POOLED RSLT: CPT | Performed by: FAMILY MEDICINE

## 2023-03-30 PROCEDURE — 36415 COLL VENOUS BLD VENIPUNCTURE: CPT | Performed by: FAMILY MEDICINE

## 2023-03-30 PROCEDURE — G0145 SCR C/V CYTO,THINLAYER,RESCR: HCPCS | Performed by: FAMILY MEDICINE

## 2023-03-30 PROCEDURE — 99396 PREV VISIT EST AGE 40-64: CPT | Performed by: FAMILY MEDICINE

## 2023-03-30 PROCEDURE — 80061 LIPID PANEL: CPT | Performed by: FAMILY MEDICINE

## 2023-03-30 PROCEDURE — 99214 OFFICE O/P EST MOD 30 MIN: CPT | Mod: 25 | Performed by: FAMILY MEDICINE

## 2023-03-30 RX ORDER — LOSARTAN POTASSIUM 50 MG/1
50 TABLET ORAL DAILY
Qty: 90 TABLET | Refills: 3 | Status: SHIPPED | OUTPATIENT
Start: 2023-03-30 | End: 2024-04-29

## 2023-03-30 RX ORDER — TEMAZEPAM 30 MG
30 CAPSULE ORAL
Qty: 30 CAPSULE | Refills: 2 | Status: SHIPPED | OUTPATIENT
Start: 2023-03-30 | End: 2023-07-15

## 2023-03-30 ASSESSMENT — ENCOUNTER SYMPTOMS
EYE PAIN: 0
HEMATOCHEZIA: 0
MYALGIAS: 0
FEVER: 0
HEADACHES: 0
NERVOUS/ANXIOUS: 0
DIARRHEA: 0
ARTHRALGIAS: 1
COUGH: 0
SORE THROAT: 0
DYSURIA: 0
CONSTIPATION: 0
DIZZINESS: 0
WEAKNESS: 0
BREAST MASS: 0
HEMATURIA: 0
HEARTBURN: 0
ABDOMINAL PAIN: 0
PALPITATIONS: 0
FREQUENCY: 0
PARESTHESIAS: 0
NAUSEA: 0
CHILLS: 0
JOINT SWELLING: 0
SHORTNESS OF BREATH: 0

## 2023-03-30 ASSESSMENT — ANXIETY QUESTIONNAIRES
3. WORRYING TOO MUCH ABOUT DIFFERENT THINGS: NOT AT ALL
2. NOT BEING ABLE TO STOP OR CONTROL WORRYING: NOT AT ALL
8. IF YOU CHECKED OFF ANY PROBLEMS, HOW DIFFICULT HAVE THESE MADE IT FOR YOU TO DO YOUR WORK, TAKE CARE OF THINGS AT HOME, OR GET ALONG WITH OTHER PEOPLE?: NOT DIFFICULT AT ALL
IF YOU CHECKED OFF ANY PROBLEMS ON THIS QUESTIONNAIRE, HOW DIFFICULT HAVE THESE PROBLEMS MADE IT FOR YOU TO DO YOUR WORK, TAKE CARE OF THINGS AT HOME, OR GET ALONG WITH OTHER PEOPLE: NOT DIFFICULT AT ALL
GAD7 TOTAL SCORE: 0
7. FEELING AFRAID AS IF SOMETHING AWFUL MIGHT HAPPEN: NOT AT ALL
5. BEING SO RESTLESS THAT IT IS HARD TO SIT STILL: NOT AT ALL
4. TROUBLE RELAXING: NOT AT ALL
GAD7 TOTAL SCORE: 0
7. FEELING AFRAID AS IF SOMETHING AWFUL MIGHT HAPPEN: NOT AT ALL
1. FEELING NERVOUS, ANXIOUS, OR ON EDGE: NOT AT ALL
6. BECOMING EASILY ANNOYED OR IRRITABLE: NOT AT ALL
GAD7 TOTAL SCORE: 0

## 2023-03-30 ASSESSMENT — PATIENT HEALTH QUESTIONNAIRE - PHQ9
10. IF YOU CHECKED OFF ANY PROBLEMS, HOW DIFFICULT HAVE THESE PROBLEMS MADE IT FOR YOU TO DO YOUR WORK, TAKE CARE OF THINGS AT HOME, OR GET ALONG WITH OTHER PEOPLE: NOT DIFFICULT AT ALL
SUM OF ALL RESPONSES TO PHQ QUESTIONS 1-9: 0
SUM OF ALL RESPONSES TO PHQ QUESTIONS 1-9: 0

## 2023-03-30 ASSESSMENT — PAIN SCALES - GENERAL: PAINLEVEL: NO PAIN (0)

## 2023-03-30 NOTE — PROGRESS NOTES
SUBJECTIVE:   CC: Aliza is an 42 year old who presents for preventive health visit.   Additional Questions 3/30/2023   Roomed by Daya   Patient has been advised of split billing requirements and indicates understanding: Yes  Healthy Habits:     Getting at least 3 servings of Calcium per day:  NO    Bi-annual eye exam:  Yes    Dental care twice a year:  Yes    Sleep apnea or symptoms of sleep apnea:  None    Diet:  Regular (no restrictions)    Frequency of exercise:  None    Taking medications regularly:  Yes    Medication side effects:  None    PHQ-2 Total Score: 0    Additional concerns today:  No      Trochanteric bursitis for 5 years  Getting worse    Hypertension Follow-up      Do you check your blood pressure regularly outside of the clinic? No     Are you following a low salt diet? No  BP Readings from Last 6 Encounters:   03/30/23 122/80   11/18/21 122/74   05/24/21 114/79   05/07/21 128/85   04/26/21 134/87   10/05/20 115/71          Today's PHQ-2 Score:   PHQ-2 ( 1999 [employer]) 3/23/2023   Q1: Little interest or pleasure in doing things 0   Q2: Feeling down, depressed or hopeless 0   PHQ-2 Score 0   PHQ-2 Total Score (12-17 Years)- Positive if 3 or more points; Administer PHQ-A if positive -   Q1: Little interest or pleasure in doing things Not at all   Q2: Feeling down, depressed or hopeless Not at all   PHQ-2 Score 0       Have you ever done Advance Care Planning? (For example, a Health Directive, POLST, or a discussion with a medical provider or your loved ones about your wishes): No, advance care planning information given to patient to review.  Advanced care planning was discussed at today's visit.    Social History     Tobacco Use     Smoking status: Former     Packs/day: 0.50     Years: 15.00     Pack years: 7.50     Types: Cigarettes     Quit date: 1/1/2008     Years since quitting: 15.2     Smokeless tobacco: Never   Substance Use Topics     Alcohol use: Yes     Comment: Few beers a week          Alcohol Use 3/23/2023   Prescreen: >3 drinks/day or >7 drinks/week? No     Reviewed orders with patient.  Reviewed health maintenance and updated orders accordingly - Yes  BP Readings from Last 3 Encounters:   03/30/23 122/80   11/18/21 122/74   05/24/21 114/79    Wt Readings from Last 3 Encounters:   03/30/23 104.3 kg (230 lb)   11/18/21 109.8 kg (242 lb)   05/24/21 103 kg (227 lb)                    Breast Cancer Screening:    Breast CA Risk Assessment (FHS-7) 3/23/2023   Do you have a family history of breast, colon, or ovarian cancer? No / Unknown         Mammogram Screening - Offered annual screening and updated Health Maintenance for mutual plan based on risk factor consideration    Pertinent mammograms are reviewed under the imaging tab.    History of abnormal Pap smear: yes -     1/16/12:Pap - HSIL. Plan colp.  1/24/12:[place] - SHUKRI 2 and 3 (moderate and severe dysplasia/squamous cell carcinoma in situ, HSIL). Plan LEEP.  1/31/12:LEEP done with SHUKRI 1,2 and 3, No high grade dysplasia seen at margins; cannot exclude focal low grade dysplasia at margin. Pt notified via [facility]. Repeat Pap in 6 mo. Reminder in epic.   8/27/12:Pap--NIL. Repeat pap in 6 months when also Due for Annual Exam. Reminder placed in EPIC  2/11/13:Pap--NIL. Pap in 6 months. Reminder placed in EPIC  8/23/2013:Pap--NIL. Pap + HPV in 1 year. Episode resolved.  9/2/14:Pap--NIL, -HPV.   6/9/2015:Pap--NIL, neg HPV. Plan cotest in 2 years.   7/14/17 Pap: NIL/neg HPV. Plan Pap+HPV in 3 years  9/15/20 NIL Pap, Neg HPV. Plan cotest in 3 years. Pt needs cotesting every 3 years through 2042.     Recent Labs   Lab Test 04/02/19  1538   CHOL 184   HDL 73   LDL 88   TRIG 116          PAP / HPV Latest Ref Rng & Units 9/15/2020 7/14/2017 6/9/2015   PAP (Historical) - NIL NIL NIL   HPV16 NEG:Negative Negative Negative Negative   HPV18 NEG:Negative Negative Negative Negative   HRHPV NEG:Negative Negative Negative Negative     Reviewed and updated as  needed this visit by clinical staff   Tobacco  Allergies  Meds  Problems  Med Hx  Surg Hx  Fam Hx          Reviewed and updated as needed this visit by Provider   Tobacco  Allergies  Meds  Problems  Med Hx  Surg Hx  Fam Hx         Past Medical History:   Diagnosis Date     Benign essential hypertension 3/7/2019     Current moderate episode of major depressive disorder, unspecified whether recurrent (H) 3/30/2023     Genital herpes      HSIL (high grade squamous intraepithelial lesion) on Pap smear 12 colp - SHUKRI 2, 3, [facility]     PMDD (premenstrual dysphoric disorder) 2014      Past Surgical History:   Procedure Laterality Date     COLONOSCOPY N/A 2021    Procedure: COLONOSCOPY;  Surgeon: Addi Johnson MD;  Location: [facility]     COLONOSCOPY       LAPAROSCOPIC TUBAL LIGATION  2011    Procedure:LAPAROSCOPIC TUBAL LIGATION; Laparoscopic Tubal Sterlization; Surgeon:RYANNE HAWK; Location:[facility]     LEEP TX, CERVICAL  2012    SHUKRI 1,2,3     ORTHOPEDIC SURGERY       SOFT TISSUE SURGERY      Ankle surgery     SURGICAL HISTORY OF -       ankle right     TONSILLECTOMY       OB History    Para Term  AB Living   2 2 2 0 0 2   SAB IAB Ectopic Multiple Live Births   0 0 0 0 2      # Outcome Date GA Lbr Yazan/2nd Weight Sex Delivery Anes PTL Lv   2 Term 11/09/10 39w0d 04:57 / 00:12 3.685 kg (8 lb 2 oz) M Vag-Spont Spinal N JHON      Name: CESAR MENENDEZ      Apgar1: 7  Apgar5: 8   1 Term 08 39w1d 05:29 3.487 kg (7 lb 11 oz) M Vag-Vacuum EPI N JHON      Birth Comments: -Vacuum.      Name: Quentin      Apgar1: 8  Apgar5: 8       Review of Systems   Constitutional: Negative for chills and fever.   HENT: Negative for congestion, ear pain, hearing loss and sore throat.    Eyes: Negative for pain and visual disturbance.   Respiratory: Negative for cough and shortness of breath.    Cardiovascular: Negative for chest pain, palpitations and peripheral  "edema.   Gastrointestinal: Negative for abdominal pain, constipation, diarrhea, heartburn, hematochezia and nausea.   Breasts:  Negative for tenderness, breast mass and discharge.   Genitourinary: Negative for dysuria, frequency, genital sores, hematuria, pelvic pain, urgency, vaginal bleeding and vaginal discharge.   Musculoskeletal: Positive for arthralgias. Negative for joint swelling and myalgias.   Skin: Negative for rash.   Neurological: Negative for dizziness, weakness, headaches and paresthesias.   Psychiatric/Behavioral: Negative for mood changes. The patient is not nervous/anxious.           OBJECTIVE:   /80 (BP Location: Right arm)   Pulse 88   Temp 97.6  F (36.4  C) (Tympanic)   Resp 20   Ht 1.651 m (5' 5\")   Wt 104.3 kg (230 lb)   LMP 03/20/2023   SpO2 99%   BMI 38.27 kg/m    Physical Exam  GENERAL: healthy, alert and no distress  EYES: Eyes grossly normal to inspection, PERRL and conjunctivae and sclerae normal  HENT: mask in place0  NECK: no adenopathy, no asymmetry, masses, or scars and thyroid normal to palpation  RESP: lungs clear to auscultation - no rales, rhonchi or wheezes  BREAST: normal without masses, tenderness or nipple discharge and no palpable axillary masses or adenopathy  CV: regular rate and rhythm, normal S1 S2, no S3 or S4, no murmur, click or rub, no peripheral edema and peripheral pulses strong  ABDOMEN: soft, nontender, no hepatosplenomegaly, no masses and bowel sounds normal   (female): normal female external genitalia, normal urethral meatus, vaginal mucosa pink, moist, well rugated, and normal cervix/adnexa/uterus without masses or discharge  MS: no gross musculoskeletal defects noted, no edema, pap taken  SKIN: no suspicious lesions or rashes  NEURO: Normal strength and tone, mentation intact and speech normal  PSYCH: mentation appears normal, affect normal/bright      ASSESSMENT/PLAN:   Aliza was seen today for physical.    Diagnoses and all orders for this " "visit:    Routine general medical examination at a health care facility    Persistent insomnia  Well controlled  -     temazepam (RESTORIL) 30 MG capsule; Take 1 capsule (30 mg) by mouth nightly as needed for sleep    Benign essential hypertension  Well controlled  -     losartan (COZAAR) 50 MG tablet; Take 1 tablet (50 mg) by mouth daily  -     Comprehensive metabolic panel (BMP + Alb, Alk Phos, ALT, AST, Total. Bili, TP); Future  -     CBC with platelets and differential; Future  -     CBC with platelets and differential  -     Comprehensive metabolic panel (BMP + Alb, Alk Phos, ALT, AST, Total. Bili, TP)    Cervical cancer screening  -     Pap Screen with HPV - recommended age 30 - 65 years  S/P LEEP    Morbid obesity (H)  -     TSH with free T4 reflex; Future  -     TSH with free T4 reflex    CARDIOVASCULAR SCREENING; LDL GOAL LESS THAN 160  -     Lipid panel reflex to direct LDL Fasting; Future  -     Lipid panel reflex to direct LDL Fasting    Trochanteric bursitis of both hips  Physical therapy   If not better, consider injections  -     Physical Therapy Referral; Future    Other orders  -     REVIEW OF HEALTH MAINTENANCE PROTOCOL ORDERS        Patient has been advised of split billing requirements and indicates understanding: Yes      COUNSELING:  Reviewed preventive health counseling, as reflected in patient instructions  Special attention given to:        Regular exercise       Healthy diet/nutrition      BMI:   Estimated body mass index is 38.27 kg/m  as calculated from the following:    Height as of this encounter: 1.651 m (5' 5\").    Weight as of this encounter: 104.3 kg (230 lb).   Weight management plan: Discussed healthy diet and exercise guidelines      She reports that she quit smoking about 15 years ago. Her smoking use included cigarettes. She has a 7.50 pack-year smoking history. She has never used smokeless tobacco.      Barb Smith MD  [facility]  Answers for " HPI/ROS submitted by the patient on 3/30/2023  If you checked off any problems, how difficult have these problems made it for you to do your work, take care of things at home, or get along with other people?: Not difficult at all  PHQ9 TOTAL SCORE: 0  ALBIN 7 TOTAL SCORE: 0

## 2023-03-31 PROBLEM — M70.62 TROCHANTERIC BURSITIS OF BOTH HIPS: Status: ACTIVE | Noted: 2023-03-31

## 2023-03-31 PROBLEM — M70.61 TROCHANTERIC BURSITIS OF BOTH HIPS: Status: ACTIVE | Noted: 2023-03-31

## 2023-04-04 LAB
BKR LAB AP GYN ADEQUACY: NORMAL
BKR LAB AP GYN INTERPRETATION: NORMAL
BKR LAB AP HPV REFLEX: NORMAL
BKR LAB AP LMP: NORMAL
BKR LAB AP PREVIOUS ABNORMAL: NORMAL
PATH REPORT.COMMENTS IMP SPEC: NORMAL
PATH REPORT.COMMENTS IMP SPEC: NORMAL
PATH REPORT.RELEVANT HX SPEC: NORMAL

## 2023-04-06 LAB
HUMAN PAPILLOMA VIRUS 16 DNA: NEGATIVE
HUMAN PAPILLOMA VIRUS 18 DNA: NEGATIVE
HUMAN PAPILLOMA VIRUS FINAL DIAGNOSIS: NORMAL
HUMAN PAPILLOMA VIRUS OTHER HR: NEGATIVE

## 2023-04-25 ENCOUNTER — MYC MEDICAL ADVICE (OUTPATIENT)
Dept: FAMILY MEDICINE | Facility: CLINIC | Age: 43
End: 2023-04-25

## 2023-07-15 DIAGNOSIS — G47.00 PERSISTENT INSOMNIA: ICD-10-CM

## 2023-07-15 RX ORDER — TEMAZEPAM 30 MG
30 CAPSULE ORAL
Qty: 30 CAPSULE | Refills: 2 | Status: SHIPPED | OUTPATIENT
Start: 2023-07-15 | End: 2023-10-13

## 2023-09-06 ENCOUNTER — MYC MEDICAL ADVICE (OUTPATIENT)
Dept: FAMILY MEDICINE | Facility: CLINIC | Age: 43
End: 2023-09-06
Payer: COMMERCIAL

## 2023-09-06 DIAGNOSIS — E66.01 MORBID OBESITY (H): Primary | ICD-10-CM

## 2023-09-06 RX ORDER — TOPIRAMATE 25 MG/1
TABLET, FILM COATED ORAL
Qty: 60 TABLET | Refills: 1 | Status: SHIPPED | OUTPATIENT
Start: 2023-09-06 | End: 2023-09-27 | Stop reason: SINTOL

## 2023-09-06 NOTE — TELEPHONE ENCOUNTER
Pls see [employer] message below.     RX pended and message routed to provider to advise.     Julie Behrendt RN

## 2023-09-08 ENCOUNTER — TELEPHONE (OUTPATIENT)
Dept: FAMILY MEDICINE | Facility: CLINIC | Age: 43
End: 2023-09-08
Payer: COMMERCIAL

## 2023-09-12 NOTE — TELEPHONE ENCOUNTER
PRIOR AUTHORIZATION DENIED    Medication: OZEMPIC (0.25 OR 0.5 MG/DOSE) 2 MG/3ML SC SOPN  Insurance Company: [employer] [place] - Phone [phone] Fax [phone]  Denial Date: 9/12/2023  Denial Rational:     Appeal Information: None Available. Not covered for this diagnosis.    Patient Notified: Yes

## 2023-09-17 ENCOUNTER — MYC MEDICAL ADVICE (OUTPATIENT)
Dept: FAMILY MEDICINE | Facility: CLINIC | Age: 43
End: 2023-09-17

## 2023-09-26 ENCOUNTER — MYC MEDICAL ADVICE (OUTPATIENT)
Dept: FAMILY MEDICINE | Facility: CLINIC | Age: 43
End: 2023-09-26
Payer: COMMERCIAL

## 2023-09-27 ENCOUNTER — E-VISIT (OUTPATIENT)
Dept: FAMILY MEDICINE | Facility: CLINIC | Age: 43
End: 2023-09-27
Payer: COMMERCIAL

## 2023-09-27 DIAGNOSIS — K82.9 GALLBLADDER ATTACK: ICD-10-CM

## 2023-09-27 DIAGNOSIS — R10.11 RUQ ABDOMINAL PAIN: Primary | ICD-10-CM

## 2023-09-27 PROCEDURE — 99421 OL DIG E/M SVC 5-10 MIN: CPT | Performed by: FAMILY MEDICINE

## 2023-10-05 ENCOUNTER — HOSPITAL ENCOUNTER (OUTPATIENT)
Dept: ULTRASOUND IMAGING | Facility: CLINIC | Age: 43
Discharge: HOME | End: 2023-10-05
Attending: FAMILY MEDICINE | Admitting: FAMILY MEDICINE
Payer: COMMERCIAL

## 2023-10-05 ENCOUNTER — TELEPHONE (OUTPATIENT)
Dept: SURGERY | Facility: CLINIC | Age: 43
End: 2023-10-05

## 2023-10-05 ENCOUNTER — OFFICE VISIT (OUTPATIENT)
Dept: SURGERY | Facility: CLINIC | Age: 43
End: 2023-10-05
Payer: COMMERCIAL

## 2023-10-05 ENCOUNTER — MYC MEDICAL ADVICE (OUTPATIENT)
Dept: FAMILY MEDICINE | Facility: CLINIC | Age: 43
End: 2023-10-05

## 2023-10-05 VITALS
DIASTOLIC BLOOD PRESSURE: 84 MMHG | BODY MASS INDEX: 39.12 KG/M2 | WEIGHT: 234.8 LBS | OXYGEN SATURATION: 99 % | SYSTOLIC BLOOD PRESSURE: 142 MMHG | HEART RATE: 86 BPM | HEIGHT: 65 IN | TEMPERATURE: 97.7 F

## 2023-10-05 DIAGNOSIS — K80.20 SYMPTOMATIC CHOLELITHIASIS: Primary | ICD-10-CM

## 2023-10-05 DIAGNOSIS — K82.9 GALLBLADDER ATTACK: ICD-10-CM

## 2023-10-05 DIAGNOSIS — R10.11 RUQ ABDOMINAL PAIN: ICD-10-CM

## 2023-10-05 LAB
ALBUMIN SERPL BCG-MCNC: 4.4 G/DL (ref 3.5–5.2)
ALP SERPL-CCNC: 83 U/L (ref 35–104)
ALT SERPL W P-5'-P-CCNC: 30 U/L (ref 0–50)
ANION GAP SERPL CALCULATED.3IONS-SCNC: 12 MMOL/L (ref 7–15)
AST SERPL W P-5'-P-CCNC: 21 U/L (ref 0–45)
BASO+EOS+MONOS # BLD AUTO: NORMAL 10*3/UL
BASO+EOS+MONOS NFR BLD AUTO: NORMAL %
BASOPHILS # BLD AUTO: 0.1 10E3/UL (ref 0–0.2)
BASOPHILS NFR BLD AUTO: 1 %
BILIRUB SERPL-MCNC: 0.2 MG/DL
BUN SERPL-MCNC: 13.7 MG/DL (ref 6–20)
CALCIUM SERPL-MCNC: 9.5 MG/DL (ref 8.6–10)
CHLORIDE SERPL-SCNC: 103 MMOL/L (ref 98–107)
CREAT SERPL-MCNC: 0.78 MG/DL (ref 0.51–0.95)
DEPRECATED HCO3 PLAS-SCNC: 25 MMOL/L (ref 22–29)
EGFRCR SERPLBLD CKD-EPI 2021: >90 ML/MIN/1.73M2
EOSINOPHIL # BLD AUTO: 0.1 10E3/UL (ref 0–0.7)
EOSINOPHIL NFR BLD AUTO: 2 %
ERYTHROCYTE [DISTWIDTH] IN BLOOD BY AUTOMATED COUNT: 12.1 % (ref 10–15)
GLUCOSE SERPL-MCNC: 142 MG/DL (ref 70–99)
HCT VFR BLD AUTO: 39.4 % (ref 35–47)
HGB BLD-MCNC: 12.7 G/DL (ref 11.7–15.7)
IMM GRANULOCYTES # BLD: 0 10E3/UL
IMM GRANULOCYTES NFR BLD: 0 %
LIPASE SERPL-CCNC: 61 U/L (ref 13–60)
LYMPHOCYTES # BLD AUTO: 1.5 10E3/UL (ref 0.8–5.3)
LYMPHOCYTES NFR BLD AUTO: 22 %
MCH RBC QN AUTO: 28.5 PG (ref 26.5–33)
MCHC RBC AUTO-ENTMCNC: 32.2 G/DL (ref 31.5–36.5)
MCV RBC AUTO: 88 FL (ref 78–100)
MONOCYTES # BLD AUTO: 0.5 10E3/UL (ref 0–1.3)
MONOCYTES NFR BLD AUTO: 7 %
NEUTROPHILS # BLD AUTO: 4.6 10E3/UL (ref 1.6–8.3)
NEUTROPHILS NFR BLD AUTO: 69 %
PLATELET # BLD AUTO: 290 10E3/UL (ref 150–450)
POTASSIUM SERPL-SCNC: 4 MMOL/L (ref 3.4–5.3)
PROT SERPL-MCNC: 7.1 G/DL (ref 6.4–8.3)
RBC # BLD AUTO: 4.46 10E6/UL (ref 3.8–5.2)
SODIUM SERPL-SCNC: 140 MMOL/L (ref 135–145)
WBC # BLD AUTO: 6.7 10E3/UL (ref 4–11)

## 2023-10-05 PROCEDURE — 36415 COLL VENOUS BLD VENIPUNCTURE: CPT | Performed by: SURGERY

## 2023-10-05 PROCEDURE — 99244 OFF/OP CNSLTJ NEW/EST MOD 40: CPT | Performed by: SURGERY

## 2023-10-05 PROCEDURE — 85025 COMPLETE CBC W/AUTO DIFF WBC: CPT | Performed by: SURGERY

## 2023-10-05 PROCEDURE — 80053 COMPREHEN METABOLIC PANEL: CPT | Performed by: SURGERY

## 2023-10-05 PROCEDURE — 83690 ASSAY OF LIPASE: CPT | Performed by: SURGERY

## 2023-10-05 PROCEDURE — 76705 ECHO EXAM OF ABDOMEN: CPT

## 2023-10-05 ASSESSMENT — PAIN SCALES - GENERAL: PAINLEVEL: NO PAIN (0)

## 2023-10-05 NOTE — TELEPHONE ENCOUNTER
Type of surgery: CHOLECYSTECTOMY, LAPAROSCOPIC (N/A)   Primary Incisional Hernia Repair (N/A)     Location of surgery: [facility]  Date and time of surgery: 11/10/2023  Surgeon: ASHLEY  Pre-Op Appt Date: 10/25/2023  Post-Op Appt Date: 11/28/2023   Packet sent out: Yes  Pre-cert/Authorization completed:  No  Date:

## 2023-10-05 NOTE — PROGRESS NOTES
Assessment and Plan:    Aliza Orr is a 43 year old female seen in consultation for Abdominal pain at the request of Barb Smith MD.    It is my impression that Aliza has symptomatic gallstones.   I have offered her a laparoscopic cholecystectomy.      We have discussed the indication, alternatives, risks and expected recovery.  Specifically we have discussed incisions, scarring, postoperative infections, anesthesia, bleeding, blood transfusion, open conversion, common bile duct injury, injury to intra-abdominal organs, adhesions that can lead to bowel obstruction, retained common bile duct stone, bile leak, DVT, PE, hernia, post cholecystectomy diarrhea, postoperative dietary restrictions and physical limitations.  We have discussed the recommended interventions and treatments for these complications.  All questions have been answered to the best of my ability.         Comorbidities:      We will schedule surgery at the patient's convenience.      Chief complaint:  Abdominal pain    HPI:  Aliza Orr is a 43 year old female who presents with intermittent epigastric pain for 2 month.  The pain is associated with eating any type of food.  Positive for associated symptoms of anorexia, nausea, and vomiting.  She does not have a history of jaundice or dark urine.  She  has not had pancreatitis in the past.        Past Medical History:   has a past medical history of Benign essential hypertension (3/7/2019), Current moderate episode of major depressive disorder, unspecified whether recurrent (H) (3/30/2023), Genital herpes, HSIL (high grade squamous intraepithelial lesion) on Pap smear (1/16/12), and PMDD (premenstrual dysphoric disorder) (9/2/2014).    Past Surgical History:  Past Surgical History:   Procedure Laterality Date    COLONOSCOPY N/A 05/07/2021    Procedure: COLONOSCOPY;  Surgeon: Addi Johnson MD;  Location: [facility]    COLONOSCOPY      LAPAROSCOPIC TUBAL LIGATION  04/20/2011     Procedure:LAPAROSCOPIC TUBAL LIGATION; Laparoscopic Tubal Sterlization; Surgeon:RYANNE HAWK; Location:[facility]    LEEP TX, CERVICAL  01/31/2012    SHUKRI 1,2,3    ORTHOPEDIC SURGERY      SOFT TISSUE SURGERY  1997    Ankle surgery    SURGICAL HISTORY OF -   1997    ankle right    TONSILLECTOMY  2003     Social History:  Social History     Socioeconomic History    Marital status:      Spouse name: Not on file    Number of children: Not on file    Years of education: Not on file    Highest education level: Not on file   Occupational History    Not on file   Tobacco Use    Smoking status: Former     Packs/day: 0.50     Years: 15.00     Pack years: 7.50     Types: Cigarettes     Quit date: 1/1/2008     Years since quitting: 15.7    Smokeless tobacco: Never   Vaping Use    Vaping Use: Never used   Substance and Sexual Activity    Alcohol use: Yes     Comment: Few beers a week    Drug use: No    Sexual activity: Yes     Partners: Male     Birth control/protection: Female Surgical   Other Topics Concern     Service No    Blood Transfusions No    Caffeine Concern No    Occupational Exposure No    Hobby Hazards No    Sleep Concern Yes     Comment: Hip pain at night and not sleeping well.    Stress Concern No    Weight Concern No    Special Diet No    Back Care No    Exercise No    Bike Helmet No    Seat Belt Yes    Self-Exams No    Parent/sibling w/ CABG, MI or angioplasty before 65F 55M? No   Social History Narrative    Not on file     Social Determinants of Health     Financial Resource Strain: Not on file   Food Insecurity: Not on file   Transportation Needs: Not on file   Physical Activity: Not on file   Stress: Not on file   Social Connections: Not on file   Interpersonal Safety: Not on file   Housing Stability: Not on file      Family History:  Family History   Problem Relation Age of Onset    Cancer Maternal Grandmother         pancreatic    Other Cancer Maternal Grandmother         Pancreatic     "Cancer Paternal Grandfather         liver,lung    Alcohol/Drug Paternal Grandfather     Other Cancer Paternal Grandfather         Liver, Lung, Lymph Node    Gastrointestinal Disease Father         ulcer    Alcohol/Drug Father     Diabetes Father     Hypertension Father     Hyperlipidemia Father     Depression Mother     Hypertension Mother     Hyperlipidemia Mother     Depression/Anxiety Mother     Heart Disease Maternal Grandfather     Alcohol/Drug Paternal Grandmother     Respiratory Daughter     Alcohol/Drug Brother     Cerebrovascular Disease Brother     Substance Abuse Brother      Family history reviewed and is not pertinent    Review of Systems:  The 10 point review of systems is negative other than noted in the HPI and above.    Physical Exam:  Vitals: BP (!) 142/84   Pulse 86   Temp 97.7  F (36.5  C) (Tympanic)   Ht 1.651 m (5' 5\")   Wt 106.5 kg (234 lb 12.8 oz)   SpO2 99%   BMI 39.07 kg/m    BMI= Body mass index is 39.07 kg/m .  General - Well developed, well nourished female in no apparent distress  HEENT:  Head normocephalic and atraumatic, pupils equal and round, conjunctivae clear, no scleral icterus, mucous membranes moist, external ears and nose normal  Neck: Supple without thyromegaly or masses  Lymphatic: No cervical, or supraclavicular lymphadenopathy  Pulmonary: Clear to auscultation bilaterally  Abdomen:   soft, flat, non-distended with no tenderness noted Sullivan's sign is absent. no masses palpated.  Extremities: Warm without edema  Musculoskeletal:  Normal station and gait  Neurologic: alert, speech is clear, moves all extremities with good strength  Psychiatric: Mood and affect appropriate  Skin: Without lesions, rashes or juandice    Relevant labs:    WBC -   Lab Results   Component Value Date    WBC 8.8 03/30/2023       HgB -   Lab Results   Component Value Date    HGB 12.2 03/30/2023       Plt-   Lab Results   Component Value Date     03/30/2023       Liver Function Studies - "   Recent Labs   Lab Test 03/30/23  1524   PROTTOTAL 7.1   ALBUMIN 4.4   BILITOTAL 0.2   ALKPHOS 82   AST 24   ALT 28       Lipase- No results found for: LIPASE        Imaging:  All imaging studies reviewed by me.    Ultrasound RUQ: positive cholelithiasis, negative gallbladder wall thickening, negative ductal dilatation, negative pericholecystic fluid, negative sonographic Sullivan's sign.    Recent Results (from the past 744 hour(s))   US Abdomen Limited    Narrative    US ABDOMEN LIMITED 10/5/2023 7:21 AM    CLINICAL HISTORY: RUQ abdominal pain; Gallbladder attack  TECHNIQUE: Limited abdominal ultrasound.    COMPARISON: None.    FINDINGS:    GALLBLADDER: Shadowing stone in the gallbladder lumen. No gallbladder  wall thickening or edema. Negative sonographic Sullivan sign.    BILE DUCTS: There is no biliary dilatation. The common duct measures 4  mm.    LIVER: Normal size, contour and echotexture.  No focal lesion.    RIGHT KIDNEY: No hydronephrosis.    PANCREAS: The visualized portions of the pancreas are normal.    No ascites.      Impression    IMPRESSION:  1.  Cholelithiasis without sonographic evidence of acute  cholecystitis. No biliary ductal dilatation.    SPARKLE TALAVERA MD         SYSTEM ID:  [id number]         This note was created using voice recognition software. Undetected word substitutions or other errors may have occurred.       Marquis Butterfield,   [facility] [place]    Please route or send letter to:  Primary Care Provider (PCP)

## 2023-10-05 NOTE — LETTER
10/5/2023         RE: Aliza Orr  [address] [facility] [address]        Dear Colleague,    Thank you for referring your patient, Aliza Orr, to the [facility]. Please see a copy of my visit note below.    Assessment and Plan:    Aliza Orr is a 43 year old female seen in consultation for Abdominal pain at the request of Barb Smith MD.    It is my impression that Aliza has symptomatic gallstones.   I have offered her a laparoscopic cholecystectomy.      We have discussed the indication, alternatives, risks and expected recovery.  Specifically we have discussed incisions, scarring, postoperative infections, anesthesia, bleeding, blood transfusion, open conversion, common bile duct injury, injury to intra-abdominal organs, adhesions that can lead to bowel obstruction, retained common bile duct stone, bile leak, DVT, PE, hernia, post cholecystectomy diarrhea, postoperative dietary restrictions and physical limitations.  We have discussed the recommended interventions and treatments for these complications.  All questions have been answered to the best of my ability.         Comorbidities:      We will schedule surgery at the patient's convenience.      Chief complaint:  Abdominal pain    HPI:  Aliza Orr is a 43 year old female who presents with intermittent epigastric pain for 2 month.  The pain is associated with eating any type of food.  Positive for associated symptoms of anorexia, nausea, and vomiting.  She does not have a history of jaundice or dark urine.  She  has not had pancreatitis in the past.        Past Medical History:   has a past medical history of Benign essential hypertension (3/7/2019), Current moderate episode of major depressive disorder, unspecified whether recurrent (H) (3/30/2023), Genital herpes, HSIL (high grade squamous intraepithelial lesion) on Pap smear (1/16/12), and PMDD (premenstrual dysphoric disorder) (9/2/2014).    Past  Surgical History:  Past Surgical History:   Procedure Laterality Date     COLONOSCOPY N/A 05/07/2021    Procedure: COLONOSCOPY;  Surgeon: Addi Johnson MD;  Location: [facility]     COLONOSCOPY       LAPAROSCOPIC TUBAL LIGATION  04/20/2011    Procedure:LAPAROSCOPIC TUBAL LIGATION; Laparoscopic Tubal Sterlization; Surgeon:RYANNE HAWK; Location:[facility]     LEEP TX, CERVICAL  01/31/2012    SHUKRI 1,2,3     ORTHOPEDIC SURGERY       SOFT TISSUE SURGERY  1997    Ankle surgery     SURGICAL HISTORY OF -   1997    ankle right     TONSILLECTOMY  2003     Social History:  Social History     Socioeconomic History     Marital status:      Spouse name: Not on file     Number of children: Not on file     Years of education: Not on file     Highest education level: Not on file   Occupational History     Not on file   Tobacco Use     Smoking status: Former     Packs/day: 0.50     Years: 15.00     Pack years: 7.50     Types: Cigarettes     Quit date: 1/1/2008     Years since quitting: 15.7     Smokeless tobacco: Never   Vaping Use     Vaping Use: Never used   Substance and Sexual Activity     Alcohol use: Yes     Comment: Few beers a week     Drug use: No     Sexual activity: Yes     Partners: Male     Birth control/protection: Female Surgical   Other Topics Concern      Service No     Blood Transfusions No     Caffeine Concern No     Occupational Exposure No     Hobby Hazards No     Sleep Concern Yes     Comment: Hip pain at night and not sleeping well.     Stress Concern No     Weight Concern No     Special Diet No     Back Care No     Exercise No     Bike Helmet No     Seat Belt Yes     Self-Exams No     Parent/sibling w/ CABG, MI or angioplasty before 65F 55M? No   Social History Narrative     Not on file     Social Determinants of Health     Financial Resource Strain: Not on file   Food Insecurity: Not on file   Transportation Needs: Not on file   Physical Activity: Not on file   Stress: Not on file   Social  "Connections: Not on file   Interpersonal Safety: Not on file   Housing Stability: Not on file      Family History:  Family History   Problem Relation Age of Onset     Cancer Maternal Grandmother         pancreatic     Other Cancer Maternal Grandmother         Pancreatic     Cancer Paternal Grandfather         liver,lung     Alcohol/Drug Paternal Grandfather      Other Cancer Paternal Grandfather         Liver, Lung, Lymph Node     Gastrointestinal Disease Father         ulcer     Alcohol/Drug Father      Diabetes Father      Hypertension Father      Hyperlipidemia Father      Depression Mother      Hypertension Mother      Hyperlipidemia Mother      Depression/Anxiety Mother      Heart Disease Maternal Grandfather      Alcohol/Drug Paternal Grandmother      Respiratory Daughter      Alcohol/Drug Brother      Cerebrovascular Disease Brother      Substance Abuse Brother      Family history reviewed and is not pertinent    Review of Systems:  The 10 point review of systems is negative other than noted in the HPI and above.    Physical Exam:  Vitals: BP (!) 142/84   Pulse 86   Temp 97.7  F (36.5  C) (Tympanic)   Ht 1.651 m (5' 5\")   Wt 106.5 kg (234 lb 12.8 oz)   SpO2 99%   BMI 39.07 kg/m    BMI= Body mass index is 39.07 kg/m .  General - Well developed, well nourished female in no apparent distress  HEENT:  Head normocephalic and atraumatic, pupils equal and round, conjunctivae clear, no scleral icterus, mucous membranes moist, external ears and nose normal  Neck: Supple without thyromegaly or masses  Lymphatic: No cervical, or supraclavicular lymphadenopathy  Pulmonary: Clear to auscultation bilaterally  Abdomen:   soft, flat, non-distended with no tenderness noted Sullivan's sign is absent. no masses palpated.  Extremities: Warm without edema  Musculoskeletal:  Normal station and gait  Neurologic: alert, speech is clear, moves all extremities with good strength  Psychiatric: Mood and affect appropriate  Skin: " Without lesions, rashes or juandice    Relevant labs:    WBC -   Lab Results   Component Value Date    WBC 8.8 03/30/2023       HgB -   Lab Results   Component Value Date    HGB 12.2 03/30/2023       Plt-   Lab Results   Component Value Date     03/30/2023       Liver Function Studies -   Recent Labs   Lab Test 03/30/23  1524   PROTTOTAL 7.1   ALBUMIN 4.4   BILITOTAL 0.2   ALKPHOS 82   AST 24   ALT 28       Lipase- No results found for: LIPASE        Imaging:  All imaging studies reviewed by me.    Ultrasound RUQ: positive cholelithiasis, negative gallbladder wall thickening, negative ductal dilatation, negative pericholecystic fluid, negative sonographic Sullivan's sign.    Recent Results (from the past 744 hour(s))   US Abdomen Limited    Narrative    US ABDOMEN LIMITED 10/5/2023 7:21 AM    CLINICAL HISTORY: RUQ abdominal pain; Gallbladder attack  TECHNIQUE: Limited abdominal ultrasound.    COMPARISON: None.    FINDINGS:    GALLBLADDER: Shadowing stone in the gallbladder lumen. No gallbladder  wall thickening or edema. Negative sonographic Sullivan sign.    BILE DUCTS: There is no biliary dilatation. The common duct measures 4  mm.    LIVER: Normal size, contour and echotexture.  No focal lesion.    RIGHT KIDNEY: No hydronephrosis.    PANCREAS: The visualized portions of the pancreas are normal.    No ascites.      Impression    IMPRESSION:  1.  Cholelithiasis without sonographic evidence of acute  cholecystitis. No biliary ductal dilatation.    SPARKLE TALAVERA MD         SYSTEM ID:  [id number]         This note was created using voice recognition software. Undetected word substitutions or other errors may have occurred.       Marquis Butterfiedl DO  [facility]    Please route or send letter to:  Primary Care Provider (PCP)      Again, thank you for allowing me to participate in the care of your patient.        Sincerely,        Marqius Butterfield, DO

## 2023-10-13 DIAGNOSIS — G47.00 PERSISTENT INSOMNIA: ICD-10-CM

## 2023-10-13 RX ORDER — TEMAZEPAM 30 MG
30 CAPSULE ORAL
Qty: 30 CAPSULE | Refills: 2 | Status: SHIPPED | OUTPATIENT
Start: 2023-10-13 | End: 2024-01-15

## 2023-10-25 ENCOUNTER — OFFICE VISIT (OUTPATIENT)
Dept: FAMILY MEDICINE | Facility: CLINIC | Age: 43
End: 2023-10-25
Payer: COMMERCIAL

## 2023-10-25 VITALS
BODY MASS INDEX: 38.75 KG/M2 | OXYGEN SATURATION: 97 % | WEIGHT: 232.6 LBS | SYSTOLIC BLOOD PRESSURE: 112 MMHG | DIASTOLIC BLOOD PRESSURE: 78 MMHG | HEART RATE: 73 BPM | TEMPERATURE: 97.4 F | HEIGHT: 65 IN | RESPIRATION RATE: 22 BRPM

## 2023-10-25 DIAGNOSIS — G47.00 INSOMNIA, UNSPECIFIED TYPE: ICD-10-CM

## 2023-10-25 DIAGNOSIS — I10 BENIGN ESSENTIAL HYPERTENSION: ICD-10-CM

## 2023-10-25 DIAGNOSIS — Z01.818 PREOP GENERAL PHYSICAL EXAM: Primary | ICD-10-CM

## 2023-10-25 DIAGNOSIS — E66.01 MORBID OBESITY (H): ICD-10-CM

## 2023-10-25 DIAGNOSIS — K80.20 CALCULUS OF GALLBLADDER WITHOUT CHOLECYSTITIS WITHOUT OBSTRUCTION: ICD-10-CM

## 2023-10-25 PROCEDURE — 90471 IMMUNIZATION ADMIN: CPT | Performed by: FAMILY MEDICINE

## 2023-10-25 PROCEDURE — 90686 IIV4 VACC NO PRSV 0.5 ML IM: CPT | Performed by: FAMILY MEDICINE

## 2023-10-25 PROCEDURE — 99214 OFFICE O/P EST MOD 30 MIN: CPT | Mod: 25 | Performed by: FAMILY MEDICINE

## 2023-10-25 ASSESSMENT — ANXIETY QUESTIONNAIRES
GAD7 TOTAL SCORE: 0
7. FEELING AFRAID AS IF SOMETHING AWFUL MIGHT HAPPEN: NOT AT ALL
2. NOT BEING ABLE TO STOP OR CONTROL WORRYING: NOT AT ALL
5. BEING SO RESTLESS THAT IT IS HARD TO SIT STILL: NOT AT ALL
6. BECOMING EASILY ANNOYED OR IRRITABLE: NOT AT ALL
GAD7 TOTAL SCORE: 0
3. WORRYING TOO MUCH ABOUT DIFFERENT THINGS: NOT AT ALL
1. FEELING NERVOUS, ANXIOUS, OR ON EDGE: NOT AT ALL
4. TROUBLE RELAXING: NOT AT ALL
IF YOU CHECKED OFF ANY PROBLEMS ON THIS QUESTIONNAIRE, HOW DIFFICULT HAVE THESE PROBLEMS MADE IT FOR YOU TO DO YOUR WORK, TAKE CARE OF THINGS AT HOME, OR GET ALONG WITH OTHER PEOPLE: NOT DIFFICULT AT ALL

## 2023-10-25 ASSESSMENT — PAIN SCALES - GENERAL: PAINLEVEL: NO PAIN (0)

## 2023-10-25 ASSESSMENT — PATIENT HEALTH QUESTIONNAIRE - PHQ9
SUM OF ALL RESPONSES TO PHQ QUESTIONS 1-9: 0
SUM OF ALL RESPONSES TO PHQ QUESTIONS 1-9: 0
10. IF YOU CHECKED OFF ANY PROBLEMS, HOW DIFFICULT HAVE THESE PROBLEMS MADE IT FOR YOU TO DO YOUR WORK, TAKE CARE OF THINGS AT HOME, OR GET ALONG WITH OTHER PEOPLE: NOT DIFFICULT AT ALL

## 2023-10-25 NOTE — PROGRESS NOTES
[facility]  [address] [place] [address]  Phone: [phone]  Fax: [phone]  Primary Provider: Barb Mejia  Pre-op Performing Provider: JABIER DOHERTY      PREOPERATIVE EVALUATION:  Today's date: 10/25/2023    Aliza is a 43 year old female who presents for a preoperative evaluation.      10/25/2023     8:23 AM   Additional Questions   Roomed by Yoselyn CAMARGO CMA       Surgical Information:  Surgery/Procedure: Cholecystectomy, laparoscopic and primary incisional hernia repair  Surgery Location: [facility]  Surgeon: Dr. Marquis Butterfield  Surgery Date: 11/10/2023  Time of Surgery: 10:45 AM  Where patient plans to recover: At home with family  Fax number for surgical facility: Note does not need to be faxed, will be available electronically in Epic.    Assessment & Plan     The proposed surgical procedure is considered INTERMEDIATE risk.      ICD-10-CM    1. Preop general physical exam  Z01.818       2. Calculus of gallbladder without cholecystitis without obstruction  K80.20       3. Benign essential hypertension  I10       4. Morbid obesity (H)  E66.01       5. Insomnia, unspecified type  G47.00           Antiplatelet or Anticoagulation Medication Instructions:   - Patient is on no antiplatelet or anticoagulation medications.    Additional Medication Instructions:  Patient is to take all scheduled medications on the day of surgery    RECOMMENDATION:  APPROVAL GIVEN to proceed with proposed procedure, without further diagnostic evaluation.      Subjective   HPI related to upcoming procedure:   43-year-old female presents for a preop physical exam.  Patient is scheduled to have laparoscopic cholecystectomy under water in 2023.  She requires evaluation and anesthesia risk assessment prior to undergoing surgery/procedure.  Patient denies any fever, chills, sore throat, cough, shortness of breath, chest pain, palpitation, diarrhea, constipation, abdominal pain, headache or  other relevant systemic symptoms.         10/19/2023     6:11 PM   Preop Questions   1. Have you ever had a heart attack or stroke? No   2. Have you ever had surgery on your heart or blood vessels, such as a stent placement, a coronary artery bypass, or surgery on an artery in your head, neck, heart, or legs? No   3. Do you have chest pain with activity? No   4. Do you have a history of  heart failure? No   5. Do you currently have a cold, bronchitis or symptoms of other infection? No   6. Do you have a cough, shortness of breath, or wheezing? No   7. Do you or anyone in your family have previous history of blood clots? No   8. Do you or does anyone in your family have a serious bleeding problem such as prolonged bleeding following surgeries or cuts? No   9. Have you ever had problems with anemia or been told to take iron pills? No   10. Have you had any abnormal blood loss such as black, tarry or bloody stools, or abnormal vaginal bleeding? No   11. Have you ever had a blood transfusion? No   12. Are you willing to have a blood transfusion if it is medically needed before, during, or after your surgery? Yes   13. Have you or any of your relatives ever had problems with anesthesia? No   14. Do you have sleep apnea, excessive snoring or daytime drowsiness? No   15. Do you have any artifical heart valves or other implanted medical devices like a pacemaker, defibrillator, or continuous glucose monitor? No   16. Do you have artificial joints? No   17. Are you allergic to latex? No   18. Is there any chance that you may be pregnant? No       Preoperative Review of :   reviewed - controlled substances reflected in medication list.      Status of Chronic Conditions:  See problem list for active medical problems.  Problems all longstanding and stable, except as noted/documented.  See ROS for pertinent symptoms related to these conditions.    Review of Systems  CONSTITUTIONAL: NEGATIVE for fever, chills, change in  weight  INTEGUMENTARY/SKIN: NEGATIVE for worrisome rashes, moles or lesions  EYES: NEGATIVE for vision changes or irritation  ENT/MOUTH: NEGATIVE for ear, mouth and throat problems  RESP: NEGATIVE for significant cough or SOB  CV: NEGATIVE for chest pain, palpitations or peripheral edema  GI: NEGATIVE for nausea, abdominal pain, heartburn, or change in bowel habits  : NEGATIVE for frequency, dysuria, or hematuria  MUSCULOSKELETAL: NEGATIVE for significant arthralgias or myalgia  NEURO: NEGATIVE for weakness, dizziness or paresthesias  ENDOCRINE: NEGATIVE for temperature intolerance, skin/hair changes  HEME: NEGATIVE for bleeding problems  PSYCHIATRIC: NEGATIVE for changes in mood or affect    Patient Active Problem List    Diagnosis Date Noted    Trochanteric bursitis of both hips 03/31/2023     Priority: Medium    Obesity (BMI 35.0-39.9) with comorbidity (H) 04/02/2019     Priority: Medium    Benign essential hypertension 03/07/2019     Priority: Medium    Persistent insomnia 07/14/2017     Priority: Medium    PMDD (premenstrual dysphoric disorder) 09/02/2014     Priority: Medium    Menorrhagia 09/02/2014     Priority: Medium    S/P LEEP 08/23/2013     Priority: Medium    CIS (carcinoma in situ of cervix) 02/11/2013     Priority: Medium    Female pelvic pain 02/11/2013     Priority: Medium    SHUKRI 3 01/16/2012     Priority: Medium     1/16/12 HSIL. Plan colp.  1/24/12 [place] SHUKRI 2 and 3. Plan LEEP.  1/31/12 LEEP SHUKRI 1, 2 and 3, cannot exclude focal low grade dysplasia at margin. Repeat Pap in 6 mo.  8/27/12 NIL. Repeat pap in 6 months  2/11/13 NIL. Pap in 6 months  8/23/13 NIL. Cotest in 1 year.   9/2/14 NIL, -HPV.   6/9/15 NIL, neg HPV. Plan cotest in 2 years.   7/14/17 NIL/neg HPV. Plan cotest in 3 years  9/15/20 NIL Pap, Neg HPV. Plan cotest in 3 years. Pt needs cotesting every 3 years through 2042.   3/30/23 NIL pap, neg HPV. Cotest in 3 years          Dyspareunia 12/23/2010     Priority: Medium    Incontinence  of urine 12/23/2010     Priority: Medium    CARDIOVASCULAR SCREENING; LDL GOAL LESS THAN 160 10/31/2010     Priority: Medium    BMI 38.0-38.9,adult 04/08/2010     Priority: Medium    LUCAS (obstructive sleep apnea) 11/05/2009     Priority: Medium     AHI 8.5, RDI 13.4, REM RDI 32.9, supine 27.2, desats to 90%      Insomnia 11/05/2009     Priority: Medium     Using tamazepam, works well  Patient is followed by [facility]ERR[facility]ITLEY for ongoing prescription controlled medicine.  Med: tamazepam.   Maximum use per month: 32  Expected duration: lifetime  agreement on file: YES 8/23/2013   Clinic visit recommended: Q 6  months       Anal fissure 08/25/2008     Priority: Medium      Past Medical History:   Diagnosis Date    Benign essential hypertension 3/7/2019    Current moderate episode of major depressive disorder, unspecified whether recurrent (H) 3/30/2023    Genital herpes     HSIL (high grade squamous intraepithelial lesion) on Pap smear 1/16/12 1/24/12 colp - SHUKRI 2, 3, CIS    PMDD (premenstrual dysphoric disorder) 9/2/2014     Past Surgical History:   Procedure Laterality Date    COLONOSCOPY N/A 05/07/2021    Procedure: COLONOSCOPY;  Surgeon: Addi Johnson MD;  Location: [facility]    COLONOSCOPY      LAPAROSCOPIC TUBAL LIGATION  04/20/2011    Procedure:LAPAROSCOPIC TUBAL LIGATION; Laparoscopic Tubal Sterlization; Surgeon:RYANNE HAWK; Location:[facility]    LEEP TX, CERVICAL  01/31/2012    SHUKRI 1,2,3    ORTHOPEDIC SURGERY      SOFT TISSUE SURGERY  1997    Ankle surgery    SURGICAL HISTORY OF -   1997    ankle right    TONSILLECTOMY  2003     Current Outpatient Medications   Medication Sig Dispense Refill    losartan (COZAAR) 50 MG tablet Take 1 tablet (50 mg) by mouth daily 90 tablet 3    temazepam (RESTORIL) 30 MG capsule TAKE 1 CAPSULE (30 MG) BY MOUTH NIGHTLY AS NEEDED FOR SLEEP 30 capsule 2       Allergies   Allergen Reactions    Nkda [No Known Drug Allergy]         Social History     Tobacco Use    Smoking  "status: Former     Packs/day: 0.50     Years: 15.00     Additional pack years: 0.00     Total pack years: 7.50     Types: Cigarettes     Quit date: 1/1/2008     Years since quitting: 15.8    Smokeless tobacco: Never   Substance Use Topics    Alcohol use: Yes     Comment: Few beers a week     Family History   Problem Relation Age of Onset    Cancer Maternal Grandmother         pancreatic    Other Cancer Maternal Grandmother         Pancreatic    Cancer Paternal Grandfather         liver,lung    Alcohol/Drug Paternal Grandfather     Other Cancer Paternal Grandfather         Liver, Lung, Lymph Node    Gastrointestinal Disease Father         ulcer    Alcohol/Drug Father     Diabetes Father     Hypertension Father     Hyperlipidemia Father     Depression Mother     Hypertension Mother     Hyperlipidemia Mother     Depression/Anxiety Mother     Heart Disease Maternal Grandfather     Alcohol/Drug Paternal Grandmother     Respiratory Daughter     Alcohol/Drug Brother     Cerebrovascular Disease Brother     Substance Abuse Brother      History   Drug Use No         Objective     /78 (BP Location: Right arm, Patient Position: Sitting, Cuff Size: Adult Large)   Pulse 73   Temp 97.4  F (36.3  C) (Tympanic)   Resp 22   Ht 1.651 m (5' 5\")   Wt 105.5 kg (232 lb 9.6 oz)   LMP  (LMP Unknown)   SpO2 97%   BMI 38.71 kg/m      Physical Exam    GENERAL APPEARANCE: healthy, alert and no distress     EYES: EOMI, PERRL     HENT: ear canals and TM's normal and nose and mouth without ulcers or lesions     NECK: no adenopathy, no asymmetry, masses, or scars and thyroid normal to palpation     RESP: lungs clear to auscultation - no rales, rhonchi or wheezes     CV: regular rates and rhythm, normal S1 S2, no S3 or S4 and no murmur, click or rub     ABDOMEN:  soft, nontender, no HSM or masses and bowel sounds normal     MS: extremities normal- no gross deformities noted, no evidence of inflammation in joints, FROM in all " extremities.     SKIN: no suspicious lesions or rashes     NEURO: Normal strength and tone, sensory exam grossly normal, mentation intact and speech normal     PSYCH: mentation appears normal. and affect normal/bright     LYMPHATICS: No cervical adenopathy    Recent Labs   Lab Test 10/05/23  0944 03/30/23  1524   HGB 12.7 12.2    301    139   POTASSIUM 4.0 4.2   CR 0.78 0.77        Diagnostics:  No labs were ordered during this visit.   No EKG required, no history of coronary heart disease, significant arrhythmia, peripheral arterial disease or other structural heart disease.    Revised Cardiac Risk Index (RCRI):  The patient has the following serious cardiovascular risks for perioperative complications:   - No serious cardiac risks = 0 points     RCRI Interpretation: 0 points: Class I (very low risk - 0.4% complication rate)         Signed Electronically by: Rudy Galicia MD  Copy of this evaluation report is provided to requesting physician.

## 2023-10-25 NOTE — H&P (VIEW-ONLY)
[facility]  [address] [place] [id number]  Phone: [phone]  Fax: [phone]  Primary Provider: Barb Mejia  Pre-op Performing Provider: JABIER DOHERTY      PREOPERATIVE EVALUATION:  Today's date: 10/25/2023    Aliza is a 43 year old female who presents for a preoperative evaluation.      10/25/2023     8:23 AM   Additional Questions   Roomed by Yoselyn CAMARGO CMA       Surgical Information:  Surgery/Procedure: Cholecystectomy, laparoscopic and primary incisional hernia repair  Surgery Location: [facility]  Surgeon: Dr. Marquis Butterfield  Surgery Date: 11/10/2023  Time of Surgery: 10:45 AM  Where patient plans to recover: At home with family  Fax number for surgical facility: Note does not need to be faxed, will be available electronically in Epic.    Assessment & Plan     The proposed surgical procedure is considered INTERMEDIATE risk.      ICD-10-CM    1. Preop general physical exam  Z01.818       2. Calculus of gallbladder without cholecystitis without obstruction  K80.20       3. Benign essential hypertension  I10       4. Morbid obesity (H)  E66.01       5. Insomnia, unspecified type  G47.00           Antiplatelet or Anticoagulation Medication Instructions:   - Patient is on no antiplatelet or anticoagulation medications.    Additional Medication Instructions:  Patient is to take all scheduled medications on the day of surgery    RECOMMENDATION:  APPROVAL GIVEN to proceed with proposed procedure, without further diagnostic evaluation.      Subjective   HPI related to upcoming procedure:   43-year-old female presents for a preop physical exam.  Patient is scheduled to have laparoscopic cholecystectomy under water in 2023.  She requires evaluation and anesthesia risk assessment prior to undergoing surgery/procedure.  Patient denies any fever, chills, sore throat, cough, shortness of breath, chest pain, palpitation, diarrhea, constipation, abdominal pain, headache or  other relevant systemic symptoms.         10/19/2023     6:11 PM   Preop Questions   1. Have you ever had a heart attack or stroke? No   2. Have you ever had surgery on your heart or blood vessels, such as a stent placement, a coronary artery bypass, or surgery on an artery in your head, neck, heart, or legs? No   3. Do you have chest pain with activity? No   4. Do you have a history of  heart failure? No   5. Do you currently have a cold, bronchitis or symptoms of other infection? No   6. Do you have a cough, shortness of breath, or wheezing? No   7. Do you or anyone in your family have previous history of blood clots? No   8. Do you or does anyone in your family have a serious bleeding problem such as prolonged bleeding following surgeries or cuts? No   9. Have you ever had problems with anemia or been told to take iron pills? No   10. Have you had any abnormal blood loss such as black, tarry or bloody stools, or abnormal vaginal bleeding? No   11. Have you ever had a blood transfusion? No   12. Are you willing to have a blood transfusion if it is medically needed before, during, or after your surgery? Yes   13. Have you or any of your relatives ever had problems with anesthesia? No   14. Do you have sleep apnea, excessive snoring or daytime drowsiness? No   15. Do you have any artifical heart valves or other implanted medical devices like a pacemaker, defibrillator, or continuous glucose monitor? No   16. Do you have artificial joints? No   17. Are you allergic to latex? No   18. Is there any chance that you may be pregnant? No       Preoperative Review of :   reviewed - controlled substances reflected in medication list.      Status of Chronic Conditions:  See problem list for active medical problems.  Problems all longstanding and stable, except as noted/documented.  See ROS for pertinent symptoms related to these conditions.    Review of Systems  CONSTITUTIONAL: NEGATIVE for fever, chills, change in  weight  INTEGUMENTARY/SKIN: NEGATIVE for worrisome rashes, moles or lesions  EYES: NEGATIVE for vision changes or irritation  ENT/MOUTH: NEGATIVE for ear, mouth and throat problems  RESP: NEGATIVE for significant cough or SOB  CV: NEGATIVE for chest pain, palpitations or peripheral edema  GI: NEGATIVE for nausea, abdominal pain, heartburn, or change in bowel habits  : NEGATIVE for frequency, dysuria, or hematuria  MUSCULOSKELETAL: NEGATIVE for significant arthralgias or myalgia  NEURO: NEGATIVE for weakness, dizziness or paresthesias  ENDOCRINE: NEGATIVE for temperature intolerance, skin/hair changes  HEME: NEGATIVE for bleeding problems  PSYCHIATRIC: NEGATIVE for changes in mood or affect    Patient Active Problem List    Diagnosis Date Noted    Trochanteric bursitis of both hips 03/31/2023     Priority: Medium    Obesity (BMI 35.0-39.9) with comorbidity (H) 04/02/2019     Priority: Medium    Benign essential hypertension 03/07/2019     Priority: Medium    Persistent insomnia 07/14/2017     Priority: Medium    PMDD (premenstrual dysphoric disorder) 09/02/2014     Priority: Medium    Menorrhagia 09/02/2014     Priority: Medium    S/P LEEP 08/23/2013     Priority: Medium    CIS (carcinoma in situ of cervix) 02/11/2013     Priority: Medium    Female pelvic pain 02/11/2013     Priority: Medium    SHUKRI 3 01/16/2012     Priority: Medium     1/16/12 HSIL. Plan colp.  1/24/12 [facility] SHUKRI 2 and 3. Plan LEEP.  1/31/12 LEEP SHUKRI 1, 2 and 3, cannot exclude focal low grade dysplasia at margin. Repeat Pap in 6 mo.  8/27/12 NIL. Repeat pap in 6 months  2/11/13 NIL. Pap in 6 months  8/23/13 NIL. Cotest in 1 year.   9/2/14 NIL, -HPV.   6/9/15 NIL, neg HPV. Plan cotest in 2 years.   7/14/17 NIL/neg HPV. Plan cotest in 3 years  9/15/20 NIL Pap, Neg HPV. Plan cotest in 3 years. Pt needs cotesting every 3 years through 2042.   3/30/23 NIL pap, neg HPV. Cotest in 3 years          Dyspareunia 12/23/2010     Priority: Medium    Incontinence  of urine 12/23/2010     Priority: Medium    CARDIOVASCULAR SCREENING; LDL GOAL LESS THAN 160 10/31/2010     Priority: Medium    BMI 38.0-38.9,adult 04/08/2010     Priority: Medium    LUCAS (obstructive sleep apnea) 11/05/2009     Priority: Medium     AHI 8.5, RDI 13.4, REM RDI 32.9, supine 27.2, desats to 90%      Insomnia 11/05/2009     Priority: Medium     Using tamazepam, works well  Patient is followed by [facility]ERR[facility] for ongoing prescription controlled medicine.  Med: tamazepam.   Maximum use per month: 32  Expected duration: lifetime  agreement on file: YES 8/23/2013   Clinic visit recommended: Q 6  months       Anal fissure 08/25/2008     Priority: Medium      Past Medical History:   Diagnosis Date    Benign essential hypertension 3/7/2019    Current moderate episode of major depressive disorder, unspecified whether recurrent (H) 3/30/2023    Genital herpes     HSIL (high grade squamous intraepithelial lesion) on Pap smear 1/16/12 1/24/12 colp - SHUKRI 2, 3, CIS    PMDD (premenstrual dysphoric disorder) 9/2/2014     Past Surgical History:   Procedure Laterality Date    COLONOSCOPY N/A 05/07/2021    Procedure: COLONOSCOPY;  Surgeon: Addi Johnson MD;  Location: [facility]    COLONOSCOPY      LAPAROSCOPIC TUBAL LIGATION  04/20/2011    Procedure:LAPAROSCOPIC TUBAL LIGATION; Laparoscopic Tubal Sterlization; Surgeon:RYANNE HAWK; Location:[facility]    LEEP TX, CERVICAL  01/31/2012    SHUKRI 1,2,3    ORTHOPEDIC SURGERY      SOFT TISSUE SURGERY  1997    Ankle surgery    SURGICAL HISTORY OF -   1997    ankle right    TONSILLECTOMY  2003     Current Outpatient Medications   Medication Sig Dispense Refill    losartan (COZAAR) 50 MG tablet Take 1 tablet (50 mg) by mouth daily 90 tablet 3    temazepam (RESTORIL) 30 MG capsule TAKE 1 CAPSULE (30 MG) BY MOUTH NIGHTLY AS NEEDED FOR SLEEP 30 capsule 2       Allergies   Allergen Reactions    Nkda [No Known Drug Allergy]         Social History     Tobacco Use    Smoking  "status: Former     Packs/day: 0.50     Years: 15.00     Additional pack years: 0.00     Total pack years: 7.50     Types: Cigarettes     Quit date: 1/1/2008     Years since quitting: 15.8    Smokeless tobacco: Never   Substance Use Topics    Alcohol use: Yes     Comment: Few beers a week     Family History   Problem Relation Age of Onset    Cancer Maternal Grandmother         pancreatic    Other Cancer Maternal Grandmother         Pancreatic    Cancer Paternal Grandfather         liver,lung    Alcohol/Drug Paternal Grandfather     Other Cancer Paternal Grandfather         Liver, Lung, Lymph Node    Gastrointestinal Disease Father         ulcer    Alcohol/Drug Father     Diabetes Father     Hypertension Father     Hyperlipidemia Father     Depression Mother     Hypertension Mother     Hyperlipidemia Mother     Depression/Anxiety Mother     Heart Disease Maternal Grandfather     Alcohol/Drug Paternal Grandmother     Respiratory Daughter     Alcohol/Drug Brother     Cerebrovascular Disease Brother     Substance Abuse Brother      History   Drug Use No         Objective     /78 (BP Location: Right arm, Patient Position: Sitting, Cuff Size: Adult Large)   Pulse 73   Temp 97.4  F (36.3  C) (Tympanic)   Resp 22   Ht 1.651 m (5' 5\")   Wt 105.5 kg (232 lb 9.6 oz)   LMP  (LMP Unknown)   SpO2 97%   BMI 38.71 kg/m      Physical Exam    GENERAL APPEARANCE: healthy, alert and no distress     EYES: EOMI, PERRL     HENT: ear canals and TM's normal and nose and mouth without ulcers or lesions     NECK: no adenopathy, no asymmetry, masses, or scars and thyroid normal to palpation     RESP: lungs clear to auscultation - no rales, rhonchi or wheezes     CV: regular rates and rhythm, normal S1 S2, no S3 or S4 and no murmur, click or rub     ABDOMEN:  soft, nontender, no HSM or masses and bowel sounds normal     MS: extremities normal- no gross deformities noted, no evidence of inflammation in joints, FROM in all " extremities.     SKIN: no suspicious lesions or rashes     NEURO: Normal strength and tone, sensory exam grossly normal, mentation intact and speech normal     PSYCH: mentation appears normal. and affect normal/bright     LYMPHATICS: No cervical adenopathy    Recent Labs   Lab Test 10/05/23  0944 03/30/23  1524   HGB 12.7 12.2    301    139   POTASSIUM 4.0 4.2   CR 0.78 0.77        Diagnostics:  No labs were ordered during this visit.   No EKG required, no history of coronary heart disease, significant arrhythmia, peripheral arterial disease or other structural heart disease.    Revised Cardiac Risk Index (RCRI):  The patient has the following serious cardiovascular risks for perioperative complications:   - No serious cardiac risks = 0 points     RCRI Interpretation: 0 points: Class I (very low risk - 0.4% complication rate)         Signed Electronically by: Rudy Galicia MD  Copy of this evaluation report is provided to requesting physician.

## 2023-11-09 ENCOUNTER — ANESTHESIA EVENT (OUTPATIENT)
Dept: SURGERY | Facility: CLINIC | Age: 43
End: 2023-11-09
Payer: COMMERCIAL

## 2023-11-09 NOTE — ANESTHESIA PREPROCEDURE EVALUATION
Anesthesia Pre-Procedure Evaluation    Patient: Aliza Orr   MRN: [id number] : 1980        Procedure : Procedure(s):  CHOLECYSTECTOMY, LAPAROSCOPIC  Primary Incisional Hernia Repair          Past Medical History:   Diagnosis Date    Benign essential hypertension 3/7/2019    Current moderate episode of major depressive disorder, unspecified whether recurrent (H) 3/30/2023    Genital herpes     HSIL (high grade squamous intraepithelial lesion) on Pap smear 12 colp - SHUKRI 2, 3, CIS    PMDD (premenstrual dysphoric disorder) 2014      Past Surgical History:   Procedure Laterality Date    COLONOSCOPY N/A 2021    Procedure: COLONOSCOPY;  Surgeon: Addi Johnson MD;  Location: [facility]    COLONOSCOPY      LAPAROSCOPIC TUBAL LIGATION  2011    Procedure:LAPAROSCOPIC TUBAL LIGATION; Laparoscopic Tubal Sterlization; Surgeon:RYANNE HAWK; Location:[facility]    LEEP TX, CERVICAL  2012    SHUKRI 1,2,3    ORTHOPEDIC SURGERY      SOFT TISSUE SURGERY      Ankle surgery    SURGICAL HISTORY OF -       ankle right    TONSILLECTOMY        Allergies   Allergen Reactions    Nkda [No Known Drug Allergy]       Social History     Tobacco Use    Smoking status: Former     Packs/day: 0.50     Years: 15.00     Additional pack years: 0.00     Total pack years: 7.50     Types: Cigarettes     Quit date: 2008     Years since quitting: 15.8    Smokeless tobacco: Never   Substance Use Topics    Alcohol use: Yes     Comment: Few beers a week      Wt Readings from Last 1 Encounters:   10/25/23 105.5 kg (232 lb 9.6 oz)        Anesthesia Evaluation   Pt has had prior anesthetic. Type: General and MAC.        ROS/MED HX  ENT/Pulmonary:     (+) sleep apnea, mild, uses CPAP,                                     Neurologic:  - neg neurologic ROS     Cardiovascular:     (+) Dyslipidemia hypertension-range: 112/78/ -   -  - -                                      METS/Exercise Tolerance: 4 - Raking  "leaves, gardening    Hematologic:  - neg hematologic  ROS     Musculoskeletal:  - neg musculoskeletal ROS     GI/Hepatic:  - neg GI/hepatic ROS     Renal/Genitourinary:  - neg Renal ROS     Endo:     (+)               Obesity,       Psychiatric/Substance Use:  - neg psychiatric ROS     Infectious Disease:  - neg infectious disease ROS     Malignancy:  - neg malignancy ROS     Other:  - neg other ROS          Physical Exam    Airway  airway exam normal      Mallampati: II   TM distance: > 3 FB   Neck ROM: full   Mouth opening: > 3 cm    Respiratory Devices and Support         Dental       (+) Completely normal teeth      Cardiovascular   cardiovascular exam normal       Rhythm and rate: regular and normal     Pulmonary   pulmonary exam normal        breath sounds clear to auscultation           OUTSIDE LABS:  CBC:   Lab Results   Component Value Date    WBC 6.7 10/05/2023    WBC 8.8 03/30/2023    HGB 12.7 10/05/2023    HGB 12.2 03/30/2023    HCT 39.4 10/05/2023    HCT 37.4 03/30/2023     10/05/2023     03/30/2023     BMP:   Lab Results   Component Value Date     10/05/2023     03/30/2023    POTASSIUM 4.0 10/05/2023    POTASSIUM 4.2 03/30/2023    CHLORIDE 103 10/05/2023    CHLORIDE 101 03/30/2023    CO2 25 10/05/2023    CO2 28 03/30/2023    BUN 13.7 10/05/2023    BUN 12.7 03/30/2023    CR 0.78 10/05/2023    CR 0.77 03/30/2023     (H) 10/05/2023    GLC 98 03/30/2023     COAGS: No results found for: \"PTT\", \"INR\", \"FIBR\"  POC:   Lab Results   Component Value Date    HCG Negative 07/28/2012     HEPATIC:   Lab Results   Component Value Date    ALBUMIN 4.4 10/05/2023    PROTTOTAL 7.1 10/05/2023    ALT 30 10/05/2023    AST 21 10/05/2023    ALKPHOS 83 10/05/2023    BILITOTAL 0.2 10/05/2023     OTHER:   Lab Results   Component Value Date    WALLY 9.5 10/05/2023    LIPASE 61 (H) 10/05/2023    TSH 1.95 03/30/2023    CRP 6.1 05/20/2019    SED 12 05/20/2019       Anesthesia Plan    ASA Status:  3  "   NPO Status:  NPO Appropriate    Anesthesia Type: General.     - Airway: ETT   Induction: Propofol, Intravenous.   Maintenance: TIVA.        Consents    Anesthesia Plan(s) and associated risks, benefits, and realistic alternatives discussed. Questions answered and patient/representative(s) expressed understanding.     - Discussed: Risks, Benefits and Alternatives for BOTH SEDATION and the PROCEDURE were discussed     - Discussed with:  Patient      - Extended Intubation/Ventilatory Support Discussed: No.      - Patient is DNR/DNI Status: No     Use of blood products discussed: No .     Postoperative Care    Pain management: IV analgesics, Oral pain medications.   PONV prophylaxis: Ondansetron (or other 5HT-3), Dexamethasone or Solumedrol, Background Propofol Infusion     Comments:                GENO Valles CRNA

## 2023-11-10 ENCOUNTER — HOSPITAL ENCOUNTER (OUTPATIENT)
Facility: CLINIC | Age: 43
Discharge: HOME | End: 2023-11-10
Attending: SURGERY | Admitting: SURGERY
Payer: COMMERCIAL

## 2023-11-10 ENCOUNTER — ANESTHESIA (OUTPATIENT)
Dept: SURGERY | Facility: CLINIC | Age: 43
End: 2023-11-10
Payer: COMMERCIAL

## 2023-11-10 VITALS
TEMPERATURE: 98.7 F | SYSTOLIC BLOOD PRESSURE: 116 MMHG | DIASTOLIC BLOOD PRESSURE: 75 MMHG | HEIGHT: 65 IN | BODY MASS INDEX: 38.65 KG/M2 | WEIGHT: 232 LBS | OXYGEN SATURATION: 99 % | RESPIRATION RATE: 12 BRPM | HEART RATE: 68 BPM

## 2023-11-10 DIAGNOSIS — Z90.49 S/P LAPAROSCOPIC CHOLECYSTECTOMY: Primary | ICD-10-CM

## 2023-11-10 PROCEDURE — 272N000001 HC OR GENERAL SUPPLY STERILE: Performed by: SURGERY

## 2023-11-10 PROCEDURE — 250N000011 HC RX IP 250 OP 636: Performed by: NURSE ANESTHETIST, CERTIFIED REGISTERED

## 2023-11-10 PROCEDURE — 271N000001 HC OR GENERAL SUPPLY NON-STERILE: Performed by: SURGERY

## 2023-11-10 PROCEDURE — 250N000011 HC RX IP 250 OP 636: Performed by: SURGERY

## 2023-11-10 PROCEDURE — 88304 TISSUE EXAM BY PATHOLOGIST: CPT | Mod: 26 | Performed by: PATHOLOGY

## 2023-11-10 PROCEDURE — 710N000009 HC RECOVERY PHASE 1, LEVEL 1, PER MIN: Performed by: SURGERY

## 2023-11-10 PROCEDURE — 250N000013 HC RX MED GY IP 250 OP 250 PS 637: Performed by: NURSE ANESTHETIST, CERTIFIED REGISTERED

## 2023-11-10 PROCEDURE — 47562 LAPAROSCOPIC CHOLECYSTECTOMY: CPT | Mod: 22 | Performed by: SURGERY

## 2023-11-10 PROCEDURE — 360N000077 HC SURGERY LEVEL 4, PER MIN: Performed by: SURGERY

## 2023-11-10 PROCEDURE — 88304 TISSUE EXAM BY PATHOLOGIST: CPT | Mod: TC | Performed by: SURGERY

## 2023-11-10 PROCEDURE — 250N000009 HC RX 250: Performed by: NURSE ANESTHETIST, CERTIFIED REGISTERED

## 2023-11-10 PROCEDURE — 710N000012 HC RECOVERY PHASE 2, PER MINUTE: Performed by: SURGERY

## 2023-11-10 PROCEDURE — 370N000017 HC ANESTHESIA TECHNICAL FEE, PER MIN: Performed by: SURGERY

## 2023-11-10 PROCEDURE — 999N000141 HC STATISTIC PRE-PROCEDURE NURSING ASSESSMENT: Performed by: SURGERY

## 2023-11-10 PROCEDURE — 258N000003 HC RX IP 258 OP 636: Performed by: NURSE ANESTHETIST, CERTIFIED REGISTERED

## 2023-11-10 PROCEDURE — 250N000025 HC SEVOFLURANE, PER MIN: Performed by: SURGERY

## 2023-11-10 PROCEDURE — 250N000009 HC RX 250

## 2023-11-10 PROCEDURE — 250N000013 HC RX MED GY IP 250 OP 250 PS 637: Performed by: PHYSICIAN ASSISTANT

## 2023-11-10 PROCEDURE — 250N000009 HC RX 250: Performed by: SURGERY

## 2023-11-10 RX ORDER — PROPOFOL 10 MG/ML
INJECTION, EMULSION INTRAVENOUS CONTINUOUS PRN
Status: DISCONTINUED | OUTPATIENT
Start: 2023-11-10 | End: 2023-11-10

## 2023-11-10 RX ORDER — INDOCYANINE GREEN AND WATER 25 MG
2.5 KIT INJECTION ONCE
Status: COMPLETED | OUTPATIENT
Start: 2023-11-10 | End: 2023-11-10

## 2023-11-10 RX ORDER — LIDOCAINE HYDROCHLORIDE 20 MG/ML
INJECTION, SOLUTION INFILTRATION; PERINEURAL PRN
Status: DISCONTINUED | OUTPATIENT
Start: 2023-11-10 | End: 2023-11-10

## 2023-11-10 RX ORDER — ONDANSETRON 2 MG/ML
4 INJECTION INTRAMUSCULAR; INTRAVENOUS EVERY 30 MIN PRN
Status: DISCONTINUED | OUTPATIENT
Start: 2023-11-10 | End: 2023-11-10 | Stop reason: HOSPADM

## 2023-11-10 RX ORDER — CEFAZOLIN SODIUM/WATER 2 G/20 ML
2 SYRINGE (ML) INTRAVENOUS
Status: COMPLETED | OUTPATIENT
Start: 2023-11-10 | End: 2023-11-10

## 2023-11-10 RX ORDER — FENTANYL CITRATE 50 UG/ML
50 INJECTION, SOLUTION INTRAMUSCULAR; INTRAVENOUS EVERY 5 MIN PRN
Status: DISCONTINUED | OUTPATIENT
Start: 2023-11-10 | End: 2023-11-10 | Stop reason: HOSPADM

## 2023-11-10 RX ORDER — ONDANSETRON 2 MG/ML
INJECTION INTRAMUSCULAR; INTRAVENOUS PRN
Status: DISCONTINUED | OUTPATIENT
Start: 2023-11-10 | End: 2023-11-10

## 2023-11-10 RX ORDER — HYDROMORPHONE HCL IN WATER/PF 6 MG/30 ML
0.4 PATIENT CONTROLLED ANALGESIA SYRINGE INTRAVENOUS EVERY 5 MIN PRN
Status: DISCONTINUED | OUTPATIENT
Start: 2023-11-10 | End: 2023-11-10 | Stop reason: HOSPADM

## 2023-11-10 RX ORDER — MAGNESIUM SULFATE HEPTAHYDRATE 40 MG/ML
2 INJECTION, SOLUTION INTRAVENOUS ONCE
Status: DISCONTINUED | OUTPATIENT
Start: 2023-11-10 | End: 2023-11-10 | Stop reason: HOSPADM

## 2023-11-10 RX ORDER — LIDOCAINE 40 MG/G
CREAM TOPICAL
Status: DISCONTINUED | OUTPATIENT
Start: 2023-11-10 | End: 2023-11-10 | Stop reason: HOSPADM

## 2023-11-10 RX ORDER — DEXAMETHASONE SODIUM PHOSPHATE 4 MG/ML
INJECTION, SOLUTION INTRA-ARTICULAR; INTRALESIONAL; INTRAMUSCULAR; INTRAVENOUS; SOFT TISSUE PRN
Status: DISCONTINUED | OUTPATIENT
Start: 2023-11-10 | End: 2023-11-10

## 2023-11-10 RX ORDER — NALOXONE HYDROCHLORIDE 0.4 MG/ML
0.4 INJECTION, SOLUTION INTRAMUSCULAR; INTRAVENOUS; SUBCUTANEOUS
Status: DISCONTINUED | OUTPATIENT
Start: 2023-11-10 | End: 2023-11-10 | Stop reason: HOSPADM

## 2023-11-10 RX ORDER — KETOROLAC TROMETHAMINE 30 MG/ML
INJECTION, SOLUTION INTRAMUSCULAR; INTRAVENOUS PRN
Status: DISCONTINUED | OUTPATIENT
Start: 2023-11-10 | End: 2023-11-10

## 2023-11-10 RX ORDER — DEXMEDETOMIDINE HYDROCHLORIDE 4 UG/ML
INJECTION, SOLUTION INTRAVENOUS PRN
Status: DISCONTINUED | OUTPATIENT
Start: 2023-11-10 | End: 2023-11-10

## 2023-11-10 RX ORDER — HEPARIN SODIUM 5000 [USP'U]/.5ML
5000 INJECTION, SOLUTION INTRAVENOUS; SUBCUTANEOUS
Status: DISCONTINUED | OUTPATIENT
Start: 2023-11-10 | End: 2023-11-10 | Stop reason: HOSPADM

## 2023-11-10 RX ORDER — HYDROXYZINE HYDROCHLORIDE 25 MG/1
25 TABLET, FILM COATED ORAL EVERY 6 HOURS PRN
Status: DISCONTINUED | OUTPATIENT
Start: 2023-11-10 | End: 2023-11-10 | Stop reason: HOSPADM

## 2023-11-10 RX ORDER — IBUPROFEN 600 MG/1
600 TABLET, FILM COATED ORAL
Status: DISCONTINUED | OUTPATIENT
Start: 2023-11-10 | End: 2023-11-10 | Stop reason: HOSPADM

## 2023-11-10 RX ORDER — NALOXONE HYDROCHLORIDE 0.4 MG/ML
0.2 INJECTION, SOLUTION INTRAMUSCULAR; INTRAVENOUS; SUBCUTANEOUS
Status: DISCONTINUED | OUTPATIENT
Start: 2023-11-10 | End: 2023-11-10 | Stop reason: HOSPADM

## 2023-11-10 RX ORDER — OXYCODONE HYDROCHLORIDE 5 MG/1
5 TABLET ORAL EVERY 6 HOURS PRN
Qty: 12 TABLET | Refills: 0 | Status: SHIPPED | OUTPATIENT
Start: 2023-11-10 | End: 2023-11-28

## 2023-11-10 RX ORDER — LIDOCAINE HYDROCHLORIDE AND EPINEPHRINE 10; 10 MG/ML; UG/ML
INJECTION, SOLUTION INFILTRATION; PERINEURAL PRN
Status: DISCONTINUED | OUTPATIENT
Start: 2023-11-10 | End: 2023-11-10 | Stop reason: HOSPADM

## 2023-11-10 RX ORDER — ONDANSETRON 4 MG/1
4 TABLET, ORALLY DISINTEGRATING ORAL EVERY 30 MIN PRN
Status: DISCONTINUED | OUTPATIENT
Start: 2023-11-10 | End: 2023-11-10 | Stop reason: HOSPADM

## 2023-11-10 RX ORDER — FENTANYL CITRATE 50 UG/ML
INJECTION, SOLUTION INTRAMUSCULAR; INTRAVENOUS PRN
Status: DISCONTINUED | OUTPATIENT
Start: 2023-11-10 | End: 2023-11-10

## 2023-11-10 RX ORDER — SODIUM CHLORIDE, SODIUM LACTATE, POTASSIUM CHLORIDE, CALCIUM CHLORIDE 600; 310; 30; 20 MG/100ML; MG/100ML; MG/100ML; MG/100ML
INJECTION, SOLUTION INTRAVENOUS CONTINUOUS
Status: DISCONTINUED | OUTPATIENT
Start: 2023-11-10 | End: 2023-11-10 | Stop reason: HOSPADM

## 2023-11-10 RX ORDER — DOCUSATE SODIUM 100 MG/1
100 CAPSULE, LIQUID FILLED ORAL 2 TIMES DAILY PRN
Qty: 12 CAPSULE | Refills: 0 | Status: SHIPPED | OUTPATIENT
Start: 2023-11-10 | End: 2023-11-28

## 2023-11-10 RX ORDER — ACETAMINOPHEN 325 MG/1
975 TABLET ORAL ONCE
Status: COMPLETED | OUTPATIENT
Start: 2023-11-10 | End: 2023-11-10

## 2023-11-10 RX ORDER — ALBUTEROL SULFATE 0.83 MG/ML
2.5 SOLUTION RESPIRATORY (INHALATION) EVERY 4 HOURS PRN
Status: DISCONTINUED | OUTPATIENT
Start: 2023-11-10 | End: 2023-11-10 | Stop reason: HOSPADM

## 2023-11-10 RX ORDER — OXYCODONE HYDROCHLORIDE 5 MG/1
5 TABLET ORAL
Status: COMPLETED | OUTPATIENT
Start: 2023-11-10 | End: 2023-11-10

## 2023-11-10 RX ORDER — FENTANYL CITRATE 50 UG/ML
25 INJECTION, SOLUTION INTRAMUSCULAR; INTRAVENOUS EVERY 5 MIN PRN
Status: DISCONTINUED | OUTPATIENT
Start: 2023-11-10 | End: 2023-11-10 | Stop reason: HOSPADM

## 2023-11-10 RX ORDER — HYDROMORPHONE HCL IN WATER/PF 6 MG/30 ML
0.2 PATIENT CONTROLLED ANALGESIA SYRINGE INTRAVENOUS EVERY 5 MIN PRN
Status: DISCONTINUED | OUTPATIENT
Start: 2023-11-10 | End: 2023-11-10 | Stop reason: HOSPADM

## 2023-11-10 RX ORDER — BUPIVACAINE HYDROCHLORIDE 5 MG/ML
INJECTION, SOLUTION PERINEURAL PRN
Status: DISCONTINUED | OUTPATIENT
Start: 2023-11-10 | End: 2023-11-10 | Stop reason: HOSPADM

## 2023-11-10 RX ORDER — PROPOFOL 10 MG/ML
INJECTION, EMULSION INTRAVENOUS PRN
Status: DISCONTINUED | OUTPATIENT
Start: 2023-11-10 | End: 2023-11-10

## 2023-11-10 RX ORDER — CEFAZOLIN SODIUM/WATER 2 G/20 ML
2 SYRINGE (ML) INTRAVENOUS SEE ADMIN INSTRUCTIONS
Status: DISCONTINUED | OUTPATIENT
Start: 2023-11-10 | End: 2023-11-10 | Stop reason: HOSPADM

## 2023-11-10 RX ADMIN — FENTANYL CITRATE 100 MCG: 50 INJECTION INTRAMUSCULAR; INTRAVENOUS at 10:59

## 2023-11-10 RX ADMIN — HYDROMORPHONE HYDROCHLORIDE 0.5 MG: 1 INJECTION, SOLUTION INTRAMUSCULAR; INTRAVENOUS; SUBCUTANEOUS at 11:24

## 2023-11-10 RX ADMIN — PROPOFOL 220 MG: 10 INJECTION, EMULSION INTRAVENOUS at 10:59

## 2023-11-10 RX ADMIN — ROCURONIUM BROMIDE 40 MG: 50 INJECTION, SOLUTION INTRAVENOUS at 10:59

## 2023-11-10 RX ADMIN — ACETAMINOPHEN 975 MG: 325 TABLET, FILM COATED ORAL at 10:11

## 2023-11-10 RX ADMIN — LIDOCAINE HYDROCHLORIDE 100 MG: 20 INJECTION, SOLUTION INFILTRATION; PERINEURAL at 10:59

## 2023-11-10 RX ADMIN — ONDANSETRON 4 MG: 2 INJECTION INTRAMUSCULAR; INTRAVENOUS at 11:12

## 2023-11-10 RX ADMIN — Medication 2 G: at 10:50

## 2023-11-10 RX ADMIN — Medication 2.5 MG: at 10:28

## 2023-11-10 RX ADMIN — DEXAMETHASONE SODIUM PHOSPHATE 4 MG: 4 INJECTION, SOLUTION INTRA-ARTICULAR; INTRALESIONAL; INTRAMUSCULAR; INTRAVENOUS; SOFT TISSUE at 11:12

## 2023-11-10 RX ADMIN — SODIUM CHLORIDE, POTASSIUM CHLORIDE, SODIUM LACTATE AND CALCIUM CHLORIDE: 600; 310; 30; 20 INJECTION, SOLUTION INTRAVENOUS at 10:27

## 2023-11-10 RX ADMIN — PROPOFOL 100 MCG/KG/MIN: 10 INJECTION, EMULSION INTRAVENOUS at 10:59

## 2023-11-10 RX ADMIN — OXYCODONE HYDROCHLORIDE 5 MG: 5 TABLET ORAL at 13:25

## 2023-11-10 RX ADMIN — PROPOFOL 30 MG: 10 INJECTION, EMULSION INTRAVENOUS at 11:15

## 2023-11-10 RX ADMIN — DEXMEDETOMIDINE HYDROCHLORIDE 8 MCG: 4 INJECTION, SOLUTION INTRAVENOUS at 11:25

## 2023-11-10 RX ADMIN — DEXMEDETOMIDINE HYDROCHLORIDE 12 MCG: 4 INJECTION, SOLUTION INTRAVENOUS at 11:20

## 2023-11-10 RX ADMIN — SUGAMMADEX 200 MG: 100 INJECTION, SOLUTION INTRAVENOUS at 11:40

## 2023-11-10 RX ADMIN — PROPOFOL 30 MG: 10 INJECTION, EMULSION INTRAVENOUS at 11:34

## 2023-11-10 RX ADMIN — ROCURONIUM BROMIDE 5 MG: 50 INJECTION, SOLUTION INTRAVENOUS at 11:34

## 2023-11-10 RX ADMIN — MIDAZOLAM 2 MG: 1 INJECTION INTRAMUSCULAR; INTRAVENOUS at 10:51

## 2023-11-10 RX ADMIN — PROPOFOL 30 MG: 10 INJECTION, EMULSION INTRAVENOUS at 11:20

## 2023-11-10 RX ADMIN — KETOROLAC TROMETHAMINE 15 MG: 30 INJECTION, SOLUTION INTRAMUSCULAR at 11:38

## 2023-11-10 ASSESSMENT — ACTIVITIES OF DAILY LIVING (ADL)
ADLS_ACUITY_SCORE: 35
ADLS_ACUITY_SCORE: 35

## 2023-11-10 NOTE — OP NOTE
Procedure Date: November 10, 2023    Pre-operative Diagnosis: Symptomatic Cholelithiasis, Incisional hernia    Post-operative Diagnosis:  Same    Procedure Performed: Laparoscopic Cholecystectomy,     Surgeon: Marquis Butterfield DO    Assistants: Mercedes Montaño PA-C (needed for retraction, suction, assistance with closure)    Anesthesia Type: General plus local    Findings: Critical view of safety, 1 cm incisional hernia defect    Estimated Blood Loss: 3 mL           Condition: Stable    Indications:   Ms. Aliza Orr presents with right upper abdominal pain after meals, and characteristics of cholelithiasis on routine ultrasound.  She had incidental incisional hernia at umbilicus She may benefit from cholecystectomy, and was advised of the indications, alternatives, benefits, and risks (including, but not limited to, bleeding, infection, conversion to open surgery, potential for bile leak or bile duct injury, MI, DVT/PE, or death). She understood the information and consented to the aforementioned operative procedure    Procedure: The patient was brought to the Operating Room and placed on the operating table in a supine position. After induction of general endotracheal anesthesia, the abdomen was prepped and draped in the usual sterile fashion. The abdomen was entered through an infraumbilical incision using an open Gina approach, a 5mm trochar was delivered under direct visualization, and the abdomen was insufflated with CO2 gas to a pressure of 15mmHg. Standard laparoscopic cholecystectomy ports were placed in the epigastrium, right midclavicular line and right anterior axillary line. The gallbladder was identified and the fundus retracted superiorly. The cystic duct and artery were carefully dissected to establish the critical view of safety, confirmed with ICG cholangiography. Both structures were secured with two hemoclips proximal and one hemooclip distal, prior to division. The gallbladder was then  dissected from the liver bed using electrocautery. The gallbladder was retrieved through the infraumbilical port, and the trochar replaced. Once pneumoperitoneum was reestablished, the gallbladder bed was inspected for bleeding and bile leak and neither were found. The patient was positioned flat, irrigant solution was suctioned free, and pneumoperitoneum was relieved before removing the trocars.     The incisional hernia defect was dissected free and pre-peritoneal fat reduced intra-abdominally.  The hernia was closed with interrupted 0-0 Vicryl sutures.  The umbilical stalk was approximated to the anterior rectus fascia using 3-0 Vicryl.  An instrument count, including laparotomy pads, sponges and needles was performed and found to be correct.  Subcutaneous closure was accomplished with interrupted 3-0 Vicryl suture. Skin edges were re-approximated with 4-0 Monocryl suture, and the wounds were all cleansed and dried prior to application of sterile glue. The patient tolerated the procedure well, was extubated without incident, and transferred to the PACU in stable condition.    Marquis Butterfield DO on 11/10/2023 at 12:14 PM

## 2023-11-10 NOTE — ANESTHESIA POSTPROCEDURE EVALUATION
Patient: Aliza Orr    Procedure: Procedure(s):  CHOLECYSTECTOMY, LAPAROSCOPIC  Primary Incisional Hernia Repair       Anesthesia Type:  General    Note:  Disposition: Outpatient   Postop Pain Control:             Sign Out: Well controlled pain   PONV:    Neuro/Psych:             Sign Out: Acceptable/Baseline neuro status   Airway/Respiratory:             Sign Out: Acceptable/Baseline resp. status   CV/Hemodynamics:             Sign Out: Acceptable CV status   Other NRE: NONE   DID A NON-ROUTINE EVENT OCCUR? No       Last vitals:  Vitals Value Taken Time   /72 11/10/23 1245   Temp 37.1  C (98.7  F) 11/10/23 1245   Pulse 69 11/10/23 1248   Resp 10 11/10/23 1248   SpO2 98 % 11/10/23 1248   Vitals shown include unfiled device data.    Electronically Signed By: GENO Melgoza CRNA  November 10, 2023  2:53 PM

## 2023-11-10 NOTE — INTERVAL H&P NOTE
"I have reviewed the surgical (or preoperative) H&P that is linked to this encounter, and examined the patient. There are no significant changes    Clinical Conditions Present on Arrival:  Clinically Significant Risk Factors Present on Admission                  # Obesity: Estimated body mass index is 38.61 kg/m  as calculated from the following:    Height as of this encounter: 1.651 m (5' 5\").    Weight as of this encounter: 105.2 kg (232 lb).       "

## 2023-11-10 NOTE — ANESTHESIA CARE TRANSFER NOTE
Patient: Aliza Orr    Procedure: Procedure(s):  CHOLECYSTECTOMY, LAPAROSCOPIC  Primary Incisional Hernia Repair       Diagnosis: Symptomatic cholelithiasis [K80.20]  Diagnosis Additional Information: No value filed.    Anesthesia Type:   General     Note:    Oropharynx: oropharynx clear of all foreign objects  Level of Consciousness: awake  Oxygen Supplementation: room air    Independent Airway: airway patency satisfactory and stable  Dentition: dentition unchanged  Vital Signs Stable: post-procedure vital signs reviewed and stable    Patient transferred to: PACU    Handoff Report: Identifed the Patient, Identified the Reponsible Provider, Reviewed the pertinent medical history, Discussed the surgical course, Reviewed Intra-OP anesthesia mangement and issues during anesthesia, Set expectations for post-procedure period and Allowed opportunity for questions and acknowledgement of understanding      Vitals:  Vitals Value Taken Time   /67 11/10/23 1215   Temp 37.2  C (98.9  F) 11/10/23 1210   Pulse 70 11/10/23 1220   Resp 13 11/10/23 1220   SpO2 100 % 11/10/23 1220   Vitals shown include unfiled device data.    Electronically Signed By: GENO Carpenter CRNA  November 10, 2023  12:21 PM

## 2023-11-10 NOTE — ANESTHESIA PROCEDURE NOTES
Airway         Procedure Start/Stop Times: 11/10/2023 11:01 AM and 11/10/2023 11:01 AM  Staff -        CRNA: Chay Mireles APRN CRNA       Performed By: CRNA  Consent for Airway        Urgency: elective  Indications and Patient Condition       Indications for airway management: bess-procedural       Induction type:intravenous       Mask difficulty assessment: 1 - vent by mask    Final Airway Details       Final airway type: endotracheal airway       Successful airway: ETT - single  Endotracheal Airway Details        ETT size (mm): 6.5       Cuffed: yes       Successful intubation technique: direct laryngoscopy       DL Blade Type: Virgen 2       Grade View of Cords: 1       Adjucts: stylet       Position: Right       Measured from: lips       Secured at (cm): 21    Post intubation assessment        Placement verified by: capnometry, equal breath sounds and chest rise        Number of attempts at approach: 1       Number of other approaches attempted: 0       Ease of procedure: easy       Dentition: Intact and Unchanged    Medication(s) Administered   Medication Administration Time: 11/10/2023 11:01 AM

## 2023-11-10 NOTE — DISCHARGE INSTRUCTIONS
HOME CARE FOLLOWING LAPAROSCOPIC CHOLECYSTECTOMY      INCISIONAL CARE:  Replace the bandage over your incisions until all drainage stops, or if more comfortable to have in place.  If present, leave the steri-strips (white paper tapes) in place for 14 days after surgery.  If Dermabond (a type of skin glue) is present, leave in place until it wears/flakes off.     BATHING:  Avoid baths for 1 week after surgery.  Showers are okay.  You may wash your hair at any time.  Gently pat your incisions dry after bathing.    ACTIVITY:  Light Activity -- you may immediately be up and about as tolerated.  Driving -- you may drive when comfortable and off narcotic pain medications.  Light Work -- resume when comfortable off pain medications.  (If you can drive, you probably can work.)  Strenuous Work/Activity -- limit lifting to 20 pounds for 1 week.  Progressively increase with time.  Active Sports (running, biking, etc.) -- cautiously resume after 2 weeks.    DISCOMFORT:  Use pain medications as prescribed by your surgeon.  Take the pain medication with some food, when possible, to minimize side effects.  Intermittent use of ice packs at the incision sites may help during the first 48 hours.  Expect gradual improvement.  You may experience shoulder pain, which is due to the air placed within your abdomen during the procedure.  This is temporary and usually passes within 2 days.    DIET:  Drink plenty of fluids.  While taking pain medications, increase dietary fiber or add a fiber supplementation like Metamucil or Citrucel to help prevent constipation - a possible side effect of pain medications.  It is not uncommon to experience some bowel changes (loose stools or constipation) after surgery.  Your body has to adapt to you no longer having a gall bladder.  To help minimize this side effect, avoid fatty foods for the first week after surgery.  You may then slowly increase the amount of fatty foods in your diet.      NAUSEA:  If  nauseated from the anesthetic/pain meds; rest in bed, get up cautiously with assistance, and drink clear liquids (juice, tea, broth).    RETURN APPOINTMENT:  Schedule a follow-up visit 2-3 weeks post-op.  Office Phone:  [phone]     CONTACT US IF THE FOLLOWING DEVELOPS:   1. A fever that is above 101     2. If there is a large amount of drainage, bleeding, or swelling.   3. Severe pain that is not relieved by your prescription.   4. Drainage that is thick, cloudy, yellow, green or white.   5. Any other questions not answered by  Frequently Asked Questions  sheet.      FREQUENTLY ASKED QUESTIONS:    Q:  How should my incision look?    A:  Normally your incision will appear slightly swollen with light redness directly along the incision itself as it heals.  It may feel like a bump or ridge as the healing/scarring happens, and over time (3-4 months) this bump or ridge feeling should slowly go away.  In general, clear or pink watery drainage can be normal at first as your incision heals, but should decrease over time.    Q:  How do I know if my incision is infected?  A:  Look at your incision for signs of infection, like redness around the incision spreading to surrounding skin, or drainage of cloudy or foul-smelling drainage.  If you feel warm, check your temperature to see if you are running a fever.    **If any of these things occur, please notify the nurse at our office.  We may need you to come into the office for an incision check.      Q:  How do I take care of my incision?  A:  If you have a dressing in place - Starting the day after surgery, replace the dressing 1-2 times a day until there is no further drainage from the incision.  At that time, a dressing is no longer needed.  Try to minimize tape on the skin if irritation is occurring at the tape sites.  If you have significant irritation from tape on the skin, please call the office to discuss other method of dressing your incision.    Small pieces of  tape called  steri-strips  may be present directly overlying your incision; these may be removed 10 days after surgery unless otherwise specified by your surgeon.  If these tapes start to loosen at the ends, you may trim them back until they fall off or are removed.    A:  If you had  Dermabond  tissue glue used as a dressing (this causes your incision to look shiny with a clear covering over it) - This type of dressing wears off with time and does not require more dressings over the top unless it is draining around the glue as it wears off.  Do not apply ointments or lotions over the incisions until the glue has completely worn off.    Q:  There is a piece of tape or a sticky  lead  still on my skin.  Can I remove this?  A:  Sometimes the sticky  leads  used for monitoring during surgery or for evaluation in the emergency department are not all removed while you are in the hospital.  These sometimes have a tab or metal dot on them.  You can easily remove these on your own, like taking off a band-aid.  If there is a gel substance under the  lead , simply wipe/clean it off with a washcloth or paper towel.      Q:  What can I do to minimize constipation (very hard stools, or lack of stools)?  A:  Stay well hydrated.  Increase your dietary fiber intake or take a fiber supplement -with plenty of water.  Walk around frequently.  You may consider an over-the-counter stool-softener.  Your Pharmacist can assist you with choosing one that is stocked at your pharmacy.  Constipation is also one of the most common side effects of pain medication.  If you are using pain medication, be pro-active and try to PREVENT problems with constipation by taking the steps above BEFORE constipation becomes a problem.    Q:  What do I do if I need more pain medications?  A:  Call the office to receive refills.  Be aware that certain pain meds cannot be called into a pharmacy and actually require a paper prescription.  A change may be made in  your pain med as you progress thru your recovery period or if you have side effects to certain meds.    --Pain meds are NOT refilled after 5pm on weekdays, and NOT AT ALL on the weekends, so please look ahead to prevent problems.      Q:  Why am I having a hard time sleeping now that I am at home?  A:  Many medications you receive while you are in the hospital can impact your sleep for a number of days after your surgery/hospitalization.  Decreased level of activity and naps during the day may also make sleeping at night difficult.  Try to minimize day-time naps, and get up frequently during the day to walk around your home during your recovery time.  Sleep aides may be of some help, but are not recommended for long-term use.      Q:  I am having some back discomfort.  What should I do?  A:  This may be related to certain positioning that was required for your surgery, extended periods of time in bed, or other changes in your overall activity level.  You may try ice, heat, acetaminophen, or ibuprofen to treat this temporarily.  Note that many pain medications have acetaminophen in them and would state this on the prescription bottle.  Be sure not to exceed the maximum of 4000mg per day of acetaminophen.     **If the pain you are having does not resolve, is severe, or is a flare of back pain you have had on other occasions prior to surgery, please contact your primary physician for further recommendations or for an appointment to be examined at their office.    Q:  Why am I having headaches?  A:  Headaches can be caused by many things:  caffeine withdrawal, use of pain meds, dehydration, high blood pressure, lack of sleep, over-activity/exhaustion, flare-up of usual migraine headaches.  If you feel this is related to muscle tension (a band-like feeling around the head, or a pressure at the low-back of the head) you may try ice or heat to this area.  You may need to drink more fluids (try electrolyte drink like  Gatorade), rest, or take your usual migraine medications.   **If your headaches do not resolve, worsen, are accompanied by other symptoms, or if your blood pressure is high, please call your primary physician for recommendation and/or examination.    Q:  I am unable to urinate.  What do I do?  A:  A small percentage of people can have difficulty urinating initially after surgery.  This includes being able to urinate only a very small amount at a time and feeling discomfort or pressure in the very low abdomen.  This is called  urinary retention , and is actually an urgent situation.  Proceed to your nearest Emergency department for evaluation (not an Urgent Care Center).  Sometimes the bladder does not work correctly after certain medications you receive during surgery, or related to certain procedures.  You may need to have a catheter placed until your bladder recovers.  When planning to go to an Emergency department, it may help to call the ER to let them know you are coming in for this problem after a surgery.  This may help you get in quicker to be evaluated.  **If you have symptoms of a urinary tract infection, please contact your primary physician for the proper evaluation and treatment.          If you have other questions, please call the office Monday thru Friday between 8am and 5pm to discuss with the nurse.  #[phone]    There is a surgeon ON CALL on weekday evenings and over the weekend in case of urgent need only, and may be contacted at the same number.    If you are having an emergency, call 911 or proceed to your nearest emergency department.    HOME CARE FOLLOWING UMBILICAL/VENTRAL HERNIA REPAIR      DIET:  No restrictions.  Increased fluid intake is recommended. While taking pain medications, increase dietary fiber or add a fiber supplementation like Metamucil or Citrucel to help prevent constipation - a possible side effect of pain medications.    NAUSEA:  If nauseated from the anesthetic/pain  "meds; rest in bed, get up cautiously with assistance, and drink clear liquids (juice, tea, broth).    ACTIVITY:  Light Activity -- you may immediately be up and about as tolerated.  Driving -- you may drive when comfortable and off narcotic pain medications.  Light Work -- resume when comfortable off pain medications.  (If you can drive, you probably can work.)  Strenuous Work/Activity -- limit lifting to 20 pounds for 3 weeks.  Active Sports (running, biking, etc.) -- cautiously resume after 4 weeks.    INCISIONAL CARE:  If you have a dressing in place, keep clean and dry for 48 hours after surgery.  After this timeframe, you may replace the gauze daily if it becomes soiled.  You may remove the dressing and shower 48 hours after surgery.  Do not submerse incision in water for 1 week.  If you have a Dermabond dressing (a type of skin glue), you may shower immediately.  Sutures will absorb and need not be removed.  If present, leave the steri-strips (white paper tapes) in place for 14 days after surgery.  If present, leave Dermabond glue in place until it wears/flakes off.  Expect a variable amount of swelling/bruising/discoloration that may appear around or below the repair site.  Some numbness around the incision is common.  A lump/\"healing ridge\" under the incision is normal and will gradually resolve over the following 1-2 months.    DISCOMFORT:  Local anesthetic placed at surgery should provide relief for 4-8 hours.  Begin taking pain pills before discomfort is severe.  Take the pain medication with some food, when possible, to minimize side effects.  Intermittent use of ice packs to the hernia repair site may help during the first 1-3 weeks after surgery.  Expect gradual improvement.    Over-the-counter anti-inflammatory medications (i.e. Ibuprofen/Advil/Motrin or Naprosyn/Aleve) may be used per package instructions in addition to or while tapering off the narcotic pain medications to decrease swelling and " sensitivity at the repair site.  DO NOT TAKE these Anti-inflammatory medications if your primary physician has advised against doing so, or if you have acid reflux, ulcer, or bleeding disorder, or take blood-thinner medications.  Call your primary physician or the surgery office if you have medication questions.      RETURN APPOINTMENT:  Schedule a follow-up visit 2-3 weeks post-op.  Office Phone:  [phone]     CONTACT US IF THE FOLLOWING DEVELOPS:   1. A fever that is above 101     2. If there is a large amount of drainage, bleeding, or swelling.   3. Severe pain that is not relieved by your prescription.   4. Drainage that is thick, cloudy, yellow, green or white.   5. Any other questions not answered by  Frequently Asked Questions  sheet.      FREQUENTLY ASKED QUESTIONS:    Q:  How should my incision look?    A:  Normally your incision will appear slightly swollen with light redness directly along the incision itself as it heals.  It may feel like a bump or ridge as the healing/scarring happens, and over time (3-4 months) this bump or ridge feeling should slowly go away.  In general, clear or pink watery drainage can be normal at first as your incision heals, but should decrease over time.    Q:  How do I know if my incision is infected?  A:  Look at your incision for signs of infection, like redness around the incision spreading to surrounding skin, or drainage of cloudy or foul-smelling drainage.  If you feel warm, check your temperature to see if you are running a fever.    **If any of these things occur, please notify the nurse at our office.  We may need you to come into the office for an incision check.      Q:  How do I take care of my incision?  A:  If you have a dressing in place - Starting the day after surgery, replace the dressing 1-2 times a day until there is no further drainage from the incision.  At that time, a dressing is no longer needed.  Try to minimize tape on the skin if irritation is  occurring at the tape sites.  If you have significant irritation from tape on the skin, please call the office to discuss other method of dressing your incision.    Small pieces of tape called  steri-strips  may be present directly overlying your incision; these may be removed 10 days after surgery unless otherwise specified by your surgeon.  If these tapes start to loosen at the ends, you may trim them back until they fall off or are removed.    A:  If you had  Dermabond  tissue glue used as a dressing (this causes your incision to look shiny with a clear covering over it) - This type of dressing wears off with time and does not require more dressings over the top unless it is draining around the glue as it wears off.  Do not apply ointments or lotions over the incisions until the glue has completely worn off.    Q:  There is a piece of tape or a sticky  lead  still on my skin.  Can I remove this?  A:  Sometimes the sticky  leads  used for monitoring during surgery or for evaluation in the emergency department are not all removed while you are in the hospital.  These sometimes have a tab or metal dot on them.  You can easily remove these on your own, like taking off a band-aid.  If there is a gel substance under the  lead , simply wipe/clean it off with a washcloth or paper towel.      Q:  What can I do to minimize constipation (very hard stools, or lack of stools)?  A:  Stay well hydrated.  Increase your dietary fiber intake or take a fiber supplement -with plenty of water.  Walk around frequently.  You may consider an over-the-counter stool-softener.  Your Pharmacist can assist you with choosing one that is stocked at your pharmacy.  Constipation is also one of the most common side effects of pain medication.  If you are using pain medication, be pro-active and try to PREVENT problems with constipation by taking the steps above BEFORE constipation becomes a problem.    Q:  What do I do if I need more pain  medications?  A:  Call the office to receive refills.  Be aware that certain pain meds cannot be called into a pharmacy and actually require a paper prescription.  A change may be made in your pain med as you progress thru your recovery period or if you have side effects to certain meds.    --Pain meds are NOT refilled after 5pm on weekdays, and NOT AT ALL on the weekends, so please look ahead to prevent problems.      Q:  Why am I having a hard time sleeping now that I am at home?  A:  Many medications you receive while you are in the hospital can impact your sleep for a number of days after your surgery/hospitalization.  Decreased level of activity and naps during the day may also make sleeping at night difficult.  Try to minimize day-time naps, and get up frequently during the day to walk around your home during your recovery time.  Sleep aides may be of some help, but are not recommended for long-term use.      Q:  I am having some back discomfort.  What should I do?  A:  This may be related to certain positioning that was required for your surgery, extended periods of time in bed, or other changes in your overall activity level.  You may try ice, heat, acetaminophen, or ibuprofen to treat this temporarily.  Note that many pain medications have acetaminophen in them and would state this on the prescription bottle.  Be sure not to exceed the maximum of 4000mg per day of acetaminophen.     **If the pain you are having does not resolve, is severe, or is a flare of back pain you have had on other occasions prior to surgery, please contact your primary physician for further recommendations or for an appointment to be examined at their office.    Q:  Why am I having headaches?  A:  Headaches can be caused by many things:  caffeine withdrawal, use of pain meds, dehydration, high blood pressure, lack of sleep, over-activity/exhaustion, flare-up of usual migraine headaches.  If you feel this is related to muscle tension  (a band-like feeling around the head, or a pressure at the low-back of the head) you may try ice or heat to this area.  You may need to drink more fluids (try electrolyte drink like Gatorade), rest, or take your usual migraine medications.   **If your headaches do not resolve, worsen, are accompanied by other symptoms, or if your blood pressure is high, please call your primary physician for recommendation and/or examination.    Q:  I am unable to urinate.  What do I do?  A:  A small percentage of people can have difficulty urinating initially after surgery.  This includes being able to urinate only a very small amount at a time and feeling discomfort or pressure in the very low abdomen.  This is called  urinary retention , and is actually an urgent situation.  Proceed to your nearest Emergency department for evaluation (not an Urgent Care Center).  Sometimes the bladder does not work correctly after certain medications you receive during surgery, or related to certain procedures.  You may need to have a catheter placed until your bladder recovers.  When planning to go to an Emergency department, it may help to call the ER to let them know you are coming in for this problem after a surgery.  This may help you get in quicker to be evaluated.  **If you have symptoms of a urinary tract infection, please contact your primary physician for the proper evaluation and treatment.          If you have other questions, please call the office Monday thru Friday between 8am and 5pm to discuss with the nurse.  #[phone]    There is a surgeon ON CALL on weekday evenings and over the weekend in case of urgent need only, and may be contacted at the same number.    If you are having an emergency, call 911 or proceed to your nearest emergency department.                        Same Day Surgery Discharge Instructions  Special Precautions After Surgery - Adult    It is not unusual to feel lightheaded or faint, up to 24 hours after  surgery or while taking pain medication.  If you have these symptoms; sit for a few minutes before standing and have someone assist you when getting up.  You should rest and relax for the next 24 hours and must have someone stay with you for at least 24 hours after your discharge.  DO NOT DRIVE any vehicle or operate mechanical equipment for 24 hours following the end of your surgery.  DO NOT DRIVE while taking narcotic pain medications that have been prescribed by your physician.  If you had a limb operated on, you must be able to use it fully to drive.  DO NOT drink alcoholic beverages for 24 hours following surgery or while taking prescription pain medication.  Drink clear liquids (apple juice, ginger ale, broth, 7-Up, etc.).  Progress to your regular diet as you feel able.  Any questions call your physician and do not make important decisions for 24 hours.    Nausea and Vomiting: Nausea and vomiting can occur any time after receiving anesthesia. If you experience nausea and vomiting we encourage you to move to a clear liquid diet and advance your diet as tolerated. If nausea and vomiting do not improve within 12 hours please call the surgeon or present to the Emergency department.     Break-through Bleeding: If your experience bleeding from your surgical site apply pressure and additional dressing per nurse instruction. For simple problems such as a saturated dressing, you may need to reinforce the dressing with more gauze and tape and put slight pressure on the site. If bleeding does not subside contact the surgeon or present to the Emergency Department.    Post-op Infection: If you develop a fever of 100.4 or greater, have pus like drainage, redness, swelling or severe pain at the surgical site not alleviated with pain medications; please contact the surgeon or present to the Emergency Department.     Medications:  Acetaminophen (Tylenol):  Next dose: 4pm.  Ibuprofen (Motrin, Advil):  Next dose:  5:30pm.  Follow the instructions on the bottle.  __________________________________________________________________________________________________________________________________  IMPORTANT NUMBERS:    [facility] Main Number:  [phone], [phone]  Pharmacy:  [phone]  Same Day Surgery:  [phone], for general post-op questions call Monday - Thursday until 8:30 p.m., Fridays until 6:00 p.m.  Nurse Advice Line:  [phone]                                                                          [facility]:  [phone]

## 2023-11-10 NOTE — ANESTHESIA POSTPROCEDURE EVALUATION
Patient: Aliza Orr    Procedure: Procedure(s):  CHOLECYSTECTOMY, LAPAROSCOPIC  Primary Incisional Hernia Repair       Anesthesia Type:  General    Note:  Disposition: Outpatient   Postop Pain Control: Uneventful            Sign Out: Well controlled pain   PONV: No   Neuro/Psych: Uneventful            Sign Out: Acceptable/Baseline neuro status   Airway/Respiratory: Uneventful            Sign Out: Acceptable/Baseline resp. status   CV/Hemodynamics: Uneventful            Sign Out: Acceptable CV status; No obvious hypovolemia; No obvious fluid overload   Other NRE: NONE   DID A NON-ROUTINE EVENT OCCUR? No           Last vitals:  Vitals Value Taken Time   /72 11/10/23 1245   Temp 37.1  C (98.7  F) 11/10/23 1245   Pulse 69 11/10/23 1248   Resp 10 11/10/23 1248   SpO2 98 % 11/10/23 1248   Vitals shown include unfiled device data.    Electronically Signed By: GENO Carpenter CRNA  November 10, 2023  1:41 PM

## 2023-11-15 LAB
PATH REPORT.COMMENTS IMP SPEC: NORMAL
PATH REPORT.COMMENTS IMP SPEC: NORMAL
PATH REPORT.FINAL DX SPEC: NORMAL
PATH REPORT.GROSS SPEC: NORMAL
PATH REPORT.MICROSCOPIC SPEC OTHER STN: NORMAL
PATH REPORT.RELEVANT HX SPEC: NORMAL
PHOTO IMAGE: NORMAL

## 2023-11-28 ENCOUNTER — OFFICE VISIT (OUTPATIENT)
Dept: SURGERY | Facility: CLINIC | Age: 43
End: 2023-11-28
Payer: COMMERCIAL

## 2023-11-28 VITALS
DIASTOLIC BLOOD PRESSURE: 82 MMHG | WEIGHT: 231.92 LBS | BODY MASS INDEX: 38.64 KG/M2 | HEART RATE: 74 BPM | HEIGHT: 65 IN | SYSTOLIC BLOOD PRESSURE: 118 MMHG | TEMPERATURE: 98 F

## 2023-11-28 DIAGNOSIS — Z90.49 S/P LAPAROSCOPIC CHOLECYSTECTOMY: Primary | ICD-10-CM

## 2023-11-28 PROCEDURE — 99024 POSTOP FOLLOW-UP VISIT: CPT | Performed by: SURGERY

## 2023-11-28 ASSESSMENT — PAIN SCALES - GENERAL: PAINLEVEL: NO PAIN (0)

## 2023-11-28 NOTE — NURSING NOTE
"Initial /82 (BP Location: Right arm, Patient Position: Sitting, Cuff Size: Adult Large)   Pulse 74   Temp 98  F (36.7  C) (Tympanic)   Ht 1.651 m (5' 5\")   Wt 105.2 kg (231 lb 14.8 oz)   LMP  (LMP Unknown)   BMI 38.59 kg/m   Estimated body mass index is 38.59 kg/m  as calculated from the following:    Height as of this encounter: 1.651 m (5' 5\").    Weight as of this encounter: 105.2 kg (231 lb 14.8 oz). .  Cherry Banks MA    "

## 2023-11-28 NOTE — PROGRESS NOTES
"General Surgery Post Op    Pt returns for follow up visit s/p laparoscopic cholecystectomy on 11/10/23.    Patient has been doing well, tolerating diet. Bowels moving well. Pain controlled. No issues with wound healing/redness/drainage. No fevers.      Physical exam: Vitals: /82 (BP Location: Right arm, Patient Position: Sitting, Cuff Size: Adult Large)   Pulse 74   Temp 98  F (36.7  C) (Tympanic)   Ht 1.651 m (5' 5\")   Wt 105.2 kg (231 lb 14.8 oz)   LMP  (LMP Unknown)   BMI 38.59 kg/m    BMI= Body mass index is 38.59 kg/m .    Exam:  Constitutional: healthy, alert, and no distress  Cardiovascular: negative  Respiratory: negative  Gastrointestinal: Abdomen soft, non-tender. BS normal. No masses, organomegaly  Incisions C/D/I    Path:  Final Diagnosis   A(1). Gallbladder, cholecystectomy:  -Chronic cholecystitis, and cholelithiasis.  -Negative for dysplasia or malignancy.       Assessment:     ICD-10-CM    1. S/P laparoscopic cholecystectomy  Z90.49         Plan: Aliza Orr was seen for follow-up after laparoscopic cholecystectomy.  Patient is doing well and recovering without issue at this time.  We reviewed the pathology which was benign.  We discussed routine post-operative care of wounds including avoiding sun exposure to wounds to limit scarring, no need for overlying ointments, and weight restrictions going forward.  Patient was instructed to call with any questions or concerns.  Aliza Orr can follow-up on an as needed basis.    Marquis Butterfield, DO on 11/28/2023 at 8:34 AM      "

## 2023-11-28 NOTE — LETTER
"    11/28/2023         RE: Aliza Orr  [address] [facility] [address]        Dear Colleague,    Thank you for referring your patient, Aliza Orr, to the [facility]. Please see a copy of my visit note below.    General Surgery Post Op    Pt returns for follow up visit s/p laparoscopic cholecystectomy on 11/10/23.    Patient has been doing well, tolerating diet. Bowels moving well. Pain controlled. No issues with wound healing/redness/drainage. No fevers.      Physical exam: Vitals: /82 (BP Location: Right arm, Patient Position: Sitting, Cuff Size: Adult Large)   Pulse 74   Temp 98  F (36.7  C) (Tympanic)   Ht 1.651 m (5' 5\")   Wt 105.2 kg (231 lb 14.8 oz)   LMP  (LMP Unknown)   BMI 38.59 kg/m    BMI= Body mass index is 38.59 kg/m .    Exam:  Constitutional: healthy, alert, and no distress  Cardiovascular: negative  Respiratory: negative  Gastrointestinal: Abdomen soft, non-tender. BS normal. No masses, organomegaly  Incisions C/D/I    Path:  Final Diagnosis   A(1). Gallbladder, cholecystectomy:  -Chronic cholecystitis, and cholelithiasis.  -Negative for dysplasia or malignancy.       Assessment:     ICD-10-CM    1. S/P laparoscopic cholecystectomy  Z90.49         Plan: Aliza Orr was seen for follow-up after laparoscopic cholecystectomy.  Patient is doing well and recovering without issue at this time.  We reviewed the pathology which was benign.  We discussed routine post-operative care of wounds including avoiding sun exposure to wounds to limit scarring, no need for overlying ointments, and weight restrictions going forward.  Patient was instructed to call with any questions or concerns.  Aliza Orr can follow-up on an as needed basis.    Marquis Butterfield DO on 11/28/2023 at 8:34 AM        Again, thank you for allowing me to participate in the care of your patient.        Sincerely,        Marquis Butterfield, DO  "

## 2023-12-03 ENCOUNTER — MYC MEDICAL ADVICE (OUTPATIENT)
Dept: FAMILY MEDICINE | Facility: CLINIC | Age: 43
End: 2023-12-03
Payer: COMMERCIAL

## 2023-12-04 NOTE — TELEPHONE ENCOUNTER
Please advise if you want to see her before prescribing Wegovy, Ozempic prior auth was denied a few months ago

## 2023-12-27 NOTE — TELEPHONE ENCOUNTER
Pt says she is out of her Temazepam for sleep and is aware that Dr Tidwell is out of office today. She asking if another provider could refill this for her.    Requested Prescriptions   Pending Prescriptions Disp Refills     temazepam (RESTORIL) 30 MG capsule 30 capsule 2     Sig: Take 1 capsule (30 mg) by mouth nightly as needed for sleep       There is no refill protocol information for this order        Temazepam 30 mg      Last Written Prescription Date:  4/2/19  Last Fill Quantity: 30,   # refills: 2  Last Office Visit: 5/20/19  Future Office visit:       Routing refill request to provider for review/approval because:  Drug not on the G, P or [facility] refill protocol or controlled substance     Writer is following for potential discharge to [facility].  Pt is scheduled for EGD 12/28 at 0730.   Writer placed message to Rosenda at [facility] for update.  Electronically signed by MANDI Hopson on 12/27/2023 at 3:25 PM

## 2024-01-12 DIAGNOSIS — G47.00 PERSISTENT INSOMNIA: ICD-10-CM

## 2024-01-15 RX ORDER — TEMAZEPAM 30 MG
30 CAPSULE ORAL
Qty: 30 CAPSULE | Refills: 2 | Status: SHIPPED | OUTPATIENT
Start: 2024-01-15 | End: 2024-03-08

## 2024-02-01 ENCOUNTER — PATIENT OUTREACH (OUTPATIENT)
Dept: CARE COORDINATION | Facility: CLINIC | Age: 44
End: 2024-02-01
Payer: COMMERCIAL

## 2024-02-29 ENCOUNTER — PATIENT OUTREACH (OUTPATIENT)
Dept: CARE COORDINATION | Facility: CLINIC | Age: 44
End: 2024-02-29
Payer: COMMERCIAL

## 2024-03-08 ENCOUNTER — TELEPHONE (OUTPATIENT)
Dept: FAMILY MEDICINE | Facility: CLINIC | Age: 44
End: 2024-03-08
Payer: COMMERCIAL

## 2024-03-08 NOTE — TELEPHONE ENCOUNTER
Prior Authorization Retail Medication Request    Medication/Dose: Temazepam 7.5 mg caps  Diagnosis and ICD code (if different than what is on RX):    New/renewal/insurance change PA/secondary ins. PA:  Previously Tried and Failed:    Rationale:      Insurance   Primary: [employer]  Insurance ID: [id number]  [phone]    Thank You,  Reena Smith, [facility]

## 2024-03-12 ENCOUNTER — MYC MEDICAL ADVICE (OUTPATIENT)
Dept: FAMILY MEDICINE | Facility: CLINIC | Age: 44
End: 2024-03-12
Payer: COMMERCIAL

## 2024-03-13 ENCOUNTER — ANCILLARY PROCEDURE (OUTPATIENT)
Dept: MAMMOGRAPHY | Facility: CLINIC | Age: 44
End: 2024-03-13
Attending: FAMILY MEDICINE
Payer: COMMERCIAL

## 2024-03-13 DIAGNOSIS — Z12.31 VISIT FOR SCREENING MAMMOGRAM: ICD-10-CM

## 2024-03-13 PROCEDURE — 77063 BREAST TOMOSYNTHESIS BI: CPT | Mod: TC | Performed by: RADIOLOGY

## 2024-03-13 PROCEDURE — 77067 SCR MAMMO BI INCL CAD: CPT | Mod: TC | Performed by: RADIOLOGY

## 2024-03-14 ENCOUNTER — PATIENT OUTREACH (OUTPATIENT)
Dept: CARE COORDINATION | Facility: CLINIC | Age: 44
End: 2024-03-14
Payer: COMMERCIAL

## 2024-03-18 ENCOUNTER — MYC MEDICAL ADVICE (OUTPATIENT)
Dept: FAMILY MEDICINE | Facility: CLINIC | Age: 44
End: 2024-03-18
Payer: COMMERCIAL

## 2024-03-18 DIAGNOSIS — G47.00 PERSISTENT INSOMNIA: ICD-10-CM

## 2024-03-18 RX ORDER — TEMAZEPAM 15 MG/1
15 CAPSULE ORAL
Qty: 14 CAPSULE | Refills: 0 | Status: SHIPPED | OUTPATIENT
Start: 2024-03-18 | End: 2024-03-19

## 2024-03-19 RX ORDER — TEMAZEPAM 7.5 MG/1
7.5 CAPSULE ORAL
Qty: 14 CAPSULE | Refills: 0 | Status: SHIPPED | OUTPATIENT
Start: 2024-03-19 | End: 2024-05-22

## 2024-03-21 NOTE — TELEPHONE ENCOUNTER
Retail Pharmacy Prior Authorization Team   Phone: [phone]    PA Initiation    Medication: TEMAZEPAM 7.5 MG PO CAPS  Insurance Company: [employer] Clinical Review - Phone [phone] Fax [phone]  Pharmacy Filling the Rx: [place], [place] - [address] [place] [address]  Filling Pharmacy Phone: [phone]  Filling Pharmacy Fax:    Start Date: 3/21/2024

## 2024-03-25 NOTE — TELEPHONE ENCOUNTER
Note: Due to record-high volumes, our turn-around time is taking longer than usual . We are currently 10 business days behind in the pools.   We are working diligently to submit all requests in a timely manner and in the order they are received. Please only flag TRUE URGENT requests as high priority to the pool at this time.   If you have questions - please send a note/message in the active PA encounter and send back to the RPPA (Retail Pharmacy Prior Authorization) team [[id number]].    If you have more specific questions about our process please reach out to our supervisor Aliza Schroeder.   Thank you!     We are currently submitting requests we received on 03/13/2024    PRIOR AUTHORIZATION DENIED    Medication: TEMAZEPAM 7.5 MG PO CAPS  Insurance Company: [employer] Clinical Review - Phone [phone] Fax [phone]  Denial Date: 3/23/2024  Denial Rational:         Appeal Information:   If provider would like to appeal please review the plan's reasons for denial listed above. Please utilize that information to complete letter and provide specific, detailed clinical information/rationale of your patient's health status to address their denial reasons.        Patient Notified: No

## 2024-03-29 RX ORDER — ZOLPIDEM TARTRATE 5 MG/1
5 TABLET ORAL
Qty: 30 TABLET | Refills: 0 | Status: SHIPPED | OUTPATIENT
Start: 2024-03-29 | End: 2024-05-03

## 2024-04-28 DIAGNOSIS — I10 BENIGN ESSENTIAL HYPERTENSION: ICD-10-CM

## 2024-04-29 RX ORDER — LOSARTAN POTASSIUM 50 MG/1
50 TABLET ORAL DAILY
Qty: 90 TABLET | Refills: 2 | Status: SHIPPED | OUTPATIENT
Start: 2024-04-29

## 2024-05-02 DIAGNOSIS — G47.00 PERSISTENT INSOMNIA: ICD-10-CM

## 2024-05-03 RX ORDER — ZOLPIDEM TARTRATE 5 MG/1
5 TABLET ORAL
Qty: 30 TABLET | Refills: 0 | Status: SHIPPED | OUTPATIENT
Start: 2024-05-03 | End: 2024-05-22

## 2024-05-03 NOTE — TELEPHONE ENCOUNTER
Called and spoke with pt. Notified new PCP is needed. Pt understood, will make appt on [facility] [employer].    Cathy Moses Patient

## 2024-05-22 ENCOUNTER — OFFICE VISIT (OUTPATIENT)
Dept: FAMILY MEDICINE | Facility: CLINIC | Age: 44
End: 2024-05-22
Payer: COMMERCIAL

## 2024-05-22 VITALS
SYSTOLIC BLOOD PRESSURE: 120 MMHG | TEMPERATURE: 98 F | BODY MASS INDEX: 38.65 KG/M2 | DIASTOLIC BLOOD PRESSURE: 84 MMHG | WEIGHT: 232 LBS | HEART RATE: 105 BPM | HEIGHT: 65 IN | RESPIRATION RATE: 20 BRPM | OXYGEN SATURATION: 98 %

## 2024-05-22 DIAGNOSIS — E66.01 MORBID OBESITY (H): ICD-10-CM

## 2024-05-22 DIAGNOSIS — R20.2 RIGHT HAND PARESTHESIA: ICD-10-CM

## 2024-05-22 DIAGNOSIS — G47.00 PERSISTENT INSOMNIA: Primary | ICD-10-CM

## 2024-05-22 PROCEDURE — 99214 OFFICE O/P EST MOD 30 MIN: CPT | Performed by: FAMILY MEDICINE

## 2024-05-22 RX ORDER — ZOLPIDEM TARTRATE 5 MG/1
5 TABLET ORAL
Qty: 30 TABLET | Refills: 0 | Status: SHIPPED | OUTPATIENT
Start: 2024-06-02 | End: 2024-07-03

## 2024-05-22 ASSESSMENT — PAIN SCALES - GENERAL: PAINLEVEL: NO PAIN (0)

## 2024-05-22 NOTE — PROGRESS NOTES
"  Assessment & Plan     Persistent insomnia  Known to have persistent insomnia, tapered off temazepam about a month ago and started Ambien.  Still complains of some temazepam withdrawal side effects.  Recommended counseling and sleep medicine referral placed.  Ambien refilled.  Stressed on regular exercise, healthy diet and engaging in healthy activities  - zolpidem (AMBIEN) 5 MG tablet; Take 1 tablet (5 mg) by mouth nightly as needed for sleep  - Adult Mental Health  Referral; Future  - Adult Sleep Eval & Management  Referral; Future    Morbid obesity (H)  Healthy lifestyle modifications stressed including regular exercise, balanced diet, weight loss and limiting salt/caffeine/pop intake    Right hand paresthesia  Right hand fourth and fifth digit numb, differentials discussed in detail including ulnar nerve neuropathy.  Hand surgery referral placed  - Orthopedic  Referral; Future      BMI  Estimated body mass index is 38.61 kg/m  as calculated from the following:    Height as of this encounter: 1.651 m (5' 5\").    Weight as of this encounter: 105.2 kg (232 lb).   Weight management plan: Discussed healthy diet and exercise guidelines        Tere Abrams is a 43 year old, presenting for the following health issues:  Pre-Op Exam      5/22/2024     9:21 AM   Additional Questions   Roomed by Daya     History of Present Illness       Reason for visit:  Regular check-up to renew meds    She eats 0-1 servings of fruits and vegetables daily.She consumes 0 sweetened beverage(s) daily.She exercises with enough effort to increase her heart rate 9 or less minutes per day.  She exercises with enough effort to increase her heart rate 3 or less days per week.   She is taking medications regularly.       Sleep follow up - needs refills    Rt hand numbness in 2 fingers      Review of Systems  Constitutional, HEENT, cardiovascular, pulmonary, gi and gu systems are negative, except as otherwise " "noted.      Objective    /84 (BP Location: Right arm)   Pulse 105   Temp 98  F (36.7  C) (Tympanic)   Resp 20   Ht 1.651 m (5' 5\")   Wt 105.2 kg (232 lb)   LMP 05/01/2024   SpO2 98%   BMI 38.61 kg/m    Body mass index is 38.61 kg/m .  Physical Exam   GENERAL: alert and no distress  EYES: Eyes grossly normal to inspection, PERRL and conjunctivae and sclerae normal  NECK: no adenopathy, no asymmetry, masses, or scars  RESP: lungs clear to auscultation - no rales, rhonchi or wheezes  CV: regular rate and rhythm, normal S1 S2, no S3 or S4, no murmur, click or rub, no peripheral edema  ABDOMEN: soft, nontender, no hepatosplenomegaly, no masses and bowel sounds normal  MS: Right fourth and fifth digit decreased sensation to touch and pressure, no joint swelling, skin discoloration noted, normal handgrip strength, normal capillary refills, radial pulses 3+  NEURO: Normal strength and tone, mentation intact and speech normal  PSYCH: mentation appears normal, tearful, anxious, and judgement and insight intact        Signed Electronically by: Rudy Galicia MD    "

## 2024-06-16 ENCOUNTER — HEALTH MAINTENANCE LETTER (OUTPATIENT)
Age: 44
End: 2024-06-16

## 2024-06-19 ENCOUNTER — ANCILLARY PROCEDURE (OUTPATIENT)
Dept: GENERAL RADIOLOGY | Facility: CLINIC | Age: 44
End: 2024-06-19
Attending: PEDIATRICS
Payer: COMMERCIAL

## 2024-06-19 ENCOUNTER — OFFICE VISIT (OUTPATIENT)
Dept: ORTHOPEDICS | Facility: CLINIC | Age: 44
End: 2024-06-19
Attending: FAMILY MEDICINE
Payer: COMMERCIAL

## 2024-06-19 ENCOUNTER — TELEPHONE (OUTPATIENT)
Dept: ORTHOPEDICS | Facility: CLINIC | Age: 44
End: 2024-06-19

## 2024-06-19 DIAGNOSIS — M79.641 RIGHT HAND PAIN: Primary | ICD-10-CM

## 2024-06-19 DIAGNOSIS — R20.2 RIGHT HAND PARESTHESIA: ICD-10-CM

## 2024-06-19 PROCEDURE — 99244 OFF/OP CNSLTJ NEW/EST MOD 40: CPT | Performed by: PEDIATRICS

## 2024-06-19 PROCEDURE — 73110 X-RAY EXAM OF WRIST: CPT | Mod: TC | Performed by: RADIOLOGY

## 2024-06-19 NOTE — PROGRESS NOTES
ASSESSMENT & PLAN    Aliza was seen today for pain, numbness and extremity weakness.    Diagnoses and all orders for this visit:    Right hand pain  -     Occupational Therapy  Referral; Future  -     EMG; Future  -     Wrist/Arm/Hand Bracking Supplies Order Wrist Brace; Right; non-thumb spica    Right hand paresthesia  -     Orthopedic  Referral  -     XR Wrist Right G/E 3 Views; Future  -     Occupational Therapy  Referral; Future  -     EMG; Future  -     Wrist/Arm/Hand Bracking Supplies Order Wrist Brace; Right; non-thumb spica      This issue is chronic and Unchanged.      ICD-10-CM    1. Right hand pain  M79.641 Occupational Therapy  Referral     EMG     Wrist/Arm/Hand Bracking Supplies Order Wrist Brace; Right; non-thumb spica      2. Right hand paresthesia  R20.2 Orthopedic  Referral     XR Wrist Right G/E 3 Views     Occupational Therapy  Referral     EMG     Wrist/Arm/Hand Bracking Supplies Order Wrist Brace; Right; non-thumb spica        Patient Instructions   We discussed these other possible diagnosis: Symptoms concerning for nerve entrapment (though not classic carpal tunnel or ulnar distribution) v. Tendinosis.    We discussed the following treatment options: symptom treatment, activity modification/rest, imaging, rehab and referral. Following discussion, plan: will start with bracing, hand therapy and EMG testing.    Plan:  - Today's Plan of Care:  Discussed wrist bracing  Rehab: Occupational Therapy: [facility] - [phone]  EMG testing    Discussed activity considerations and other supportive care including Ice/Heat, OTC and other topical medications as needed.    Follow Up: In clinic with Dr. Stout after EMG (wait at least 1-2 days)     Concerning signs and symptoms were reviewed and all questions were answered at this time.    Mary Stout MD [facility]  Sports Medicine Physician  [facility]      -----  Chief  "Complaint   Patient presents with    Right Hand - Pain, Numbness, Extremity Weakness       SUBJECTIVE  Aliza Orr is a/an 43 year old female who is seen in consultation at the request of  Rudy Galicia M.D. for evaluation of right hand numbness.    The patient is seen by themselves.  The patient is Right handed    Onset: 6 month(s) ago. Reports insidious onset without acute precipitating event.  Has lessened in the last week or 2.    Location of Pain: right hand/wrist;   Worsened by: wrist flexion, pronation/supination, index finger extension, denies worsening at night  Better with: elevation, \"shaking it\"   Treatments tried: no treatment tried to date  Associated symptoms: swelling, numbness, tingling, warmth, weakness of right hand, feeling of instability, and intense pain  - numbness dorsal fourth and fifth digits, some on the radial first digit.  - she reports pain in the fourth and fifth digits with finger extension.  - went to PCP and was referred to our clinic    Orthopedic/Surgical history: Yes, had similar issue on the left wrist/hand last year.   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  CV: distal perfusion intact   Resp: normal respiratory effort without conversational dyspnea   Psych: normal mood and affect  Gait: NORMAL  Neuro: Normal light sensory exam of upper extremity    Bilateral Wrist and Hand exam  Inspection:       No swelling, bruising or deformity bilateral    Tender:       None - points to 4th and 5th fingers    Non Tender:       Remainder of the Wrist and Hand bilateral    ROM:       Full and symmetric active and passive range of motion of the forearm, wrist and digits bilateral    Strength:       5/5 strength in the muscles of the hand, wrist and forearm bilateral    Special Tests:        Mild " pain with Tinel's test right and       neg (-) Phalen's test right    Neurovascular:       2+ radial pulses bilaterally with brisk capillary refill and      normal sensation to light touch in the radial, median and ulnar nerve distributions      RADIOLOGY:  Final results and radiologist's interpretation, available in the [facility] health record.  Images were reviewed with the patient in the office today.  My personal interpretation of the performed imaging:    3 XR views of right wrist reviewed: no acute bony abnormality, no significant degenerative change  - will follow official read    Reviewed PCP note  Review of the result(s) of each unique test - XR

## 2024-06-19 NOTE — LETTER
6/19/2024      Aliza Orr  [address] [facility] [address]      Dear Colleague,    Thank you for referring your patient, Aliza Orr, to the [facility] [place]. Please see a copy of my visit note below.    ASSESSMENT & PLAN    Aliza was seen today for pain, numbness and extremity weakness.    Diagnoses and all orders for this visit:    Right hand pain  -     Occupational Therapy  Referral; Future  -     EMG; Future  -     Wrist/Arm/Hand Bracking Supplies Order Wrist Brace; Right; non-thumb spica    Right hand paresthesia  -     Orthopedic  Referral  -     XR Wrist Right G/E 3 Views; Future  -     Occupational Therapy  Referral; Future  -     EMG; Future  -     Wrist/Arm/Hand Bracking Supplies Order Wrist Brace; Right; non-thumb spica      This issue is chronic and Unchanged.      ICD-10-CM    1. Right hand pain  M79.641 Occupational Therapy  Referral     EMG     Wrist/Arm/Hand Bracking Supplies Order Wrist Brace; Right; non-thumb spica      2. Right hand paresthesia  R20.2 Orthopedic  Referral     XR Wrist Right G/E 3 Views     Occupational Therapy  Referral     EMG     Wrist/Arm/Hand Bracking Supplies Order Wrist Brace; Right; non-thumb spica        Patient Instructions   We discussed these other possible diagnosis: Symptoms concerning for nerve entrapment (though not classic carpal tunnel or ulnar distribution) v. Tendinosis.    We discussed the following treatment options: symptom treatment, activity modification/rest, imaging, rehab and referral. Following discussion, plan: will start with bracing, hand therapy and EMG testing.    Plan:  - Today's Plan of Care:  Discussed wrist bracing  Rehab: Occupational Therapy: [facility] - [phone]  EMG testing    Discussed activity considerations and other supportive care including Ice/Heat, OTC and other topical medications as needed.    Follow Up: In clinic  "with Dr. Stout after EMG (wait at least 1-2 days)     Concerning signs and symptoms were reviewed and all questions were answered at this time.    Mary Stout MD [facility]  Sports Medicine Physician  [facility]      -----  Chief Complaint   Patient presents with     Right Hand - Pain, Numbness, Extremity Weakness       SUBJECTIVE  Aliza Orr is a/an 43 year old female who is seen in consultation at the request of  Rudy Galicia M.D. for evaluation of right hand numbness.    The patient is seen by themselves.  The patient is Right handed    Onset: 6 month(s) ago. Reports insidious onset without acute precipitating event.  Has lessened in the last week or 2.    Location of Pain: right hand/wrist;   Worsened by: wrist flexion, pronation/supination, index finger extension, denies worsening at night  Better with: elevation, \"shaking it\"   Treatments tried: no treatment tried to date  Associated symptoms: swelling, numbness, tingling, warmth, weakness of right hand, feeling of instability, and intense pain  - numbness dorsal fourth and fifth digits, some on the radial first digit.  - she reports pain in the fourth and fifth digits with finger extension.  - went to PCP and was referred to our clinic    Orthopedic/Surgical history: Yes, had similar issue on the left wrist/hand last year.   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  CV: distal perfusion intact   Resp: normal respiratory effort without conversational dyspnea   Psych: normal mood and affect  Gait: NORMAL  Neuro: Normal light sensory exam of upper extremity    Bilateral Wrist and Hand exam  Inspection:       No swelling, bruising or deformity bilateral    Tender:       None - points to 4th and 5th fingers    Non Tender:       Remainder of the Wrist " and Hand bilateral    ROM:       Full and symmetric active and passive range of motion of the forearm, wrist and digits bilateral    Strength:       5/5 strength in the muscles of the hand, wrist and forearm bilateral    Special Tests:        Mild pain with Tinel's test right and       neg (-) Phalen's test right    Neurovascular:       2+ radial pulses bilaterally with brisk capillary refill and      normal sensation to light touch in the radial, median and ulnar nerve distributions      RADIOLOGY:  Final results and radiologist's interpretation, available in the [facility] health record.  Images were reviewed with the patient in the office today.  My personal interpretation of the performed imaging:    3 XR views of right wrist reviewed: no acute bony abnormality, no significant degenerative change  - will follow official read    Reviewed PCP note  Review of the result(s) of each unique test - XR             Again, thank you for allowing me to participate in the care of your patient.        Sincerely,        Mary Stout MD

## 2024-06-19 NOTE — TELEPHONE ENCOUNTER
EMG orders sent to;     [facility]  Dr. Joseph Arambula MD  Phone: [phone]  Fax: [phone]      Herlinda Eddy ATC, CSCS  Dr. Stout's Clinical Coordinator  [facility] [place] [facility]

## 2024-06-19 NOTE — PATIENT INSTRUCTIONS
We discussed these other possible diagnosis: Symptoms concerning for nerve entrapment (though not classic carpal tunnel or ulnar distribution) v. Tendinosis.    We discussed the following treatment options: symptom treatment, activity modification/rest, imaging, rehab and referral. Following discussion, plan: will start with bracing, hand therapy and EMG testing.    Plan:  - Today's Plan of Care:  Discussed wrist bracing  [facility]hab[facility] [phone]  EMG testing    Discussed activity considerations and other supportive care including Ice/Heat, OTC and other topical medications as needed.    Follow Up: In clinic with Dr. Stout after EMG (wait at least 1-2 days)     If you have any further questions for your physician or physician s care team you can call [phone].     [facility] [place]  Address: [address], [place] [address]  Phone: [phone]  Fax: [phone]    [facility]  Address: [address], [place] [address]  Phone: [phone]  Fax: [phone]    [facility]  Dr. Joseph Arambula MD  Phone: [phone]  Fax: [phone]    [facility] [place]  [address], [address]  [place] [address]  Phone; [phone]  Fax: [phone]

## 2024-07-03 DIAGNOSIS — G47.00 PERSISTENT INSOMNIA: ICD-10-CM

## 2024-07-03 RX ORDER — ZOLPIDEM TARTRATE 5 MG/1
5 TABLET ORAL
Qty: 30 TABLET | Refills: 0 | Status: SHIPPED | OUTPATIENT
Start: 2024-07-03 | End: 2024-07-25

## 2024-07-03 NOTE — TELEPHONE ENCOUNTER
Pending Prescriptions:                       Disp   Refills    zolpidem (AMBIEN) 5 MG tablet [Pharmacy Me*30 tab*0        Sig: Take 1 tablet (5 mg) by mouth nightly as needed for           sleep    Routing refill request to provider for review/approval because:  Drug not on the [facility] refill protocol     Christy Martin RN  [facility]

## 2024-07-23 ENCOUNTER — TRANSFERRED RECORDS (OUTPATIENT)
Dept: HEALTH INFORMATION MANAGEMENT | Facility: CLINIC | Age: 44
End: 2024-07-23
Payer: COMMERCIAL

## 2024-07-24 ENCOUNTER — VIRTUAL VISIT (OUTPATIENT)
Dept: ORTHOPEDICS | Facility: CLINIC | Age: 44
End: 2024-07-24
Payer: COMMERCIAL

## 2024-07-24 DIAGNOSIS — R20.2 RIGHT HAND PARESTHESIA: Primary | ICD-10-CM

## 2024-07-24 DIAGNOSIS — M79.641 RIGHT HAND PAIN: ICD-10-CM

## 2024-07-24 PROCEDURE — 99212 OFFICE O/P EST SF 10 MIN: CPT | Mod: 95 | Performed by: PEDIATRICS

## 2024-07-24 NOTE — PROGRESS NOTES
Aliza is a 43 year old who is being evaluated via a billable video visit.    What phone number would you like to be contacted at? [phone] (home)     How would you like to obtain your AVS? Matternet    Video-Visit Details    Type of service:  Video Visit   Originating Location (pt. Location): Home    Distant Location (provider location):  On-site  Platform used for Video Visit: AmWell  Call was 11mins 30 sec, ended at 3pm.    ASSESSMENT & PLAN    Diagnoses and all orders for this visit:    Right hand paresthesia    Right hand pain      This issue is chronic and Unchanged.      ICD-10-CM    1. Right hand paresthesia  R20.2       2. Right hand pain  M79.641         Patient Instructions   We discussed these other possible diagnosis: EMG with evidence of mild carpal tunnel syndrome, however dorsal area of numbness does not fit this distribution.  Possible ulnar nerve entrapment that is not evaluated with EMG.  Other differential includes wrist extensor tendinosis.  Discussed next step would be evaluation by hand therapy.    Plan:  - Today's Plan of Care:  Rehab: Occupational Therapy: [facility] - [phone]  Discussed activity considerations and other supportive care including Ice/Heat, OTC and other topical medications as needed.    -We also discussed other future treatment options:  MRI of right wrist  Trial of carpal tunnel injection  Referral to Hand Surgery    Follow Up: 1 month and TBD based on OT evaluation    Concerning signs and symptoms were reviewed and all questions were answered at this time.    Mary Stout MD [facility]  Sports Medicine Physician  [facility]    SUBJECTIVE- Interim History July 24, 2024    No chief complaint on file.      Aliza Orr is a 43 year old female who is seen in f/u up for    Right hand paresthesia  Right hand pain. Since last visit on 6/19/24 patient has been doing the same, She is looking to go over EMG today.  Onset: 7 month(s) ago. Reports  "insidious onset without acute precipitating event.    Location of Pain: right hand/wrist;   Worsened by: wrist flexion, pronation/supination, index finger extension, denies worsening at night  Better with: elevation, \"shaking it\"   Treatments tried: no treatment tried to date, previous imaging (xray 6/19/24, EMG 7/23/24)  Associated symptoms: swelling, numbness, tingling, warmth, weakness of right hand, feeling of instability, and intense pain  - numbness dorsal fourth and fifth digits, some on the radial first digit.  - she reports pain in the fourth and fifth digits with finger extension.  - went to PCP and was referred to our clinic     Orthopedic/Surgical history: Yes, had similar issue on the left wrist/hand last year.   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   GENERAL: alert and no distress  EYES: Eyes grossly normal to inspection.  No discharge or erythema, or obvious scleral/conjunctival abnormalities.  RESP: No audible wheeze, cough, or visible cyanosis.    SKIN: Visible skin clear. No significant rash, abnormal pigmentation or lesions.  NEURO: Cranial nerves grossly intact.  Mentation and speech appropriate for age.  PSYCH: Appropriate affect, tone    Normal ROM bilateral hands and wrists    RADIOLOGY:  Final results and radiologist's interpretation, available in the [facility] health record.  Images were reviewed with the patient in the office today.  My personal interpretation of the performed imaging:     Results for orders placed or performed in visit on 06/19/24   XR Wrist Right G/E 3 Views    Narrative    RIGHT WRIST THREE OR MORE VIEWS   6/19/2024 8:30 AM     HISTORY:  Right hand paresthesia.    COMPARISON: None.      Impression    IMPRESSION: Normal. No signs of degenerative or inflammatory  arthropathy or fracture. No soft tissue abnormalities " evident  radiographically.    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 There is electrodiagnostic evidence of sensory fiber demyelination, no motor or sensory axon loss  There is no electrodiagnostic evidence of right ulnar neuropathy at the elbow or wrist  There is not electrodiagnostic evidence of the right cervical radiculopathy      Review of the result(s) of each unique test - XR and EMG

## 2024-07-24 NOTE — PATIENT INSTRUCTIONS
We discussed these other possible diagnosis: EMG with evidence of mild carpal tunnel syndrome, however dorsal area of numbness does not fit this distribution.  Possible ulnar nerve entrapment that is not evaluated with EMG.  Other differential includes wrist extensor tendinosis.  Discussed next step would be evaluation by hand therapy.    Plan:  - Today's Plan of Care:  Rehab: [facility] - [phone]  Discussed activity considerations and other supportive care including Ice/Heat, OTC and other topical medications as needed.    -We also discussed other future treatment options:  MRI of right wrist  Trial of carpal tunnel injection  Referral to Hand Surgery    Follow Up: 1 month and TBD based on OT evaluation    If you have any further questions for your physician or physician s care team you can call [phone].

## 2024-07-24 NOTE — LETTER
7/24/2024      Aliza Orr  [address] [facility] [address]      Dear Colleague,    Thank you for referring your patient, Aliza Orr, to the [facility] [place]. Please see a copy of my visit note below.    Aliza is a 43 year old who is being evaluated via a billable video visit.    What phone number would you like to be contacted at? [phone] (home)     How would you like to obtain your AVS? MyChart    Video-Visit Details    Type of service:  Video Visit   Originating Location (pt. Location): Home    Distant Location (provider location):  On-site  Platform used for Video Visit: AmWell  Call was 11mins 30 sec, ended at 3pm.    ASSESSMENT & PLAN    Diagnoses and all orders for this visit:    Right hand paresthesia    Right hand pain      This issue is chronic and Unchanged.      ICD-10-CM    1. Right hand paresthesia  R20.2       2. Right hand pain  M79.641         Patient Instructions   We discussed these other possible diagnosis: EMG with evidence of mild carpal tunnel syndrome, however dorsal area of numbness does not fit this distribution.  Possible ulnar nerve entrapment that is not evaluated with EMG.  Other differential includes wrist extensor tendinosis.  Discussed next step would be evaluation by hand therapy.    Plan:  - Today's Plan of Care:  Rehab: Occupational Therapy: [facility] - [phone]  Discussed activity considerations and other supportive care including Ice/Heat, OTC and other topical medications as needed.    -We also discussed other future treatment options:  MRI of right wrist  Trial of carpal tunnel injection  Referral to Hand Surgery    Follow Up: 1 month and TBD based on OT evaluation    Concerning signs and symptoms were reviewed and all questions were answered at this time.    Mary Stout MD [facility]  Sports Medicine Physician  [facility]    SUBJECTIVE- Interim History July 24, 2024    No chief complaint on  "file.      Aliza Orr is a 43 year old female who is seen in f/u up for    Right hand paresthesia  Right hand pain. Since last visit on 6/19/24 patient has been doing the same, She is looking to go over EMG today.  Onset: 7 month(s) ago. Reports insidious onset without acute precipitating event.    Location of Pain: right hand/wrist;   Worsened by: wrist flexion, pronation/supination, index finger extension, denies worsening at night  Better with: elevation, \"shaking it\"   Treatments tried: no treatment tried to date, previous imaging (xray 6/19/24, EMG 7/23/24)  Associated symptoms: swelling, numbness, tingling, warmth, weakness of right hand, feeling of instability, and intense pain  - numbness dorsal fourth and fifth digits, some on the radial first digit.  - she reports pain in the fourth and fifth digits with finger extension.  - went to PCP and was referred to our clinic     Orthopedic/Surgical history: Yes, had similar issue on the left wrist/hand last year.   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   GENERAL: alert and no distress  EYES: Eyes grossly normal to inspection.  No discharge or erythema, or obvious scleral/conjunctival abnormalities.  RESP: No audible wheeze, cough, or visible cyanosis.    SKIN: Visible skin clear. No significant rash, abnormal pigmentation or lesions.  NEURO: Cranial nerves grossly intact.  Mentation and speech appropriate for age.  PSYCH: Appropriate affect, tone    Normal ROM bilateral hands and wrists    RADIOLOGY:  Final results and radiologist's interpretation, available in the [facility] health record.  Images were reviewed with the patient in the office today.  My personal interpretation of the performed imaging:     Results for orders placed or performed in visit on 06/19/24   XR Wrist Right G/E 3 Views    Narrative    RIGHT WRIST " THREE OR MORE VIEWS   6/19/2024 8:30 AM     HISTORY:  Right hand paresthesia.    COMPARISON: None.      Impression    IMPRESSION: Normal. No signs of degenerative or inflammatory  arthropathy or fracture. No soft tissue abnormalities evident  radiographically.    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 There is electrodiagnostic evidence of sensory fiber demyelination, no motor or sensory axon loss  There is no electrodiagnostic evidence of right ulnar neuropathy at the elbow or wrist  There is not electrodiagnostic evidence of the right cervical radiculopathy      Review of the result(s) of each unique test - XR and EMG             Again, thank you for allowing me to participate in the care of your patient.        Sincerely,        Mary Stout MD

## 2024-07-24 NOTE — Clinical Note
Ciro Scott, let me know what you think after evaluating this patient.  She is describing an area of dorsal hand numbness on the ulnar side.  She does have pain in that area with wrist flexion, possible irritation of those extensor tendons.  EMG shows mild carpal tunnel, however this does not really fit with her symptoms.  I am having trouble figuring out what is going on, I know you did mention possible ganglion cyst in the past, however never seen that causing numbness.

## 2024-07-25 ENCOUNTER — MYC REFILL (OUTPATIENT)
Dept: FAMILY MEDICINE | Facility: CLINIC | Age: 44
End: 2024-07-25
Payer: COMMERCIAL

## 2024-07-25 DIAGNOSIS — G47.00 PERSISTENT INSOMNIA: ICD-10-CM

## 2024-07-26 ENCOUNTER — TELEPHONE (OUTPATIENT)
Dept: ORTHOPEDICS | Facility: CLINIC | Age: 44
End: 2024-07-26

## 2024-07-26 ENCOUNTER — THERAPY VISIT (OUTPATIENT)
Dept: OCCUPATIONAL THERAPY | Facility: CLINIC | Age: 44
End: 2024-07-26
Attending: PEDIATRICS
Payer: COMMERCIAL

## 2024-07-26 DIAGNOSIS — R20.2 RIGHT HAND PARESTHESIA: Primary | ICD-10-CM

## 2024-07-26 PROCEDURE — 97165 OT EVAL LOW COMPLEX 30 MIN: CPT | Mod: GO | Performed by: OCCUPATIONAL THERAPIST

## 2024-07-26 PROCEDURE — 97110 THERAPEUTIC EXERCISES: CPT | Mod: GO | Performed by: OCCUPATIONAL THERAPIST

## 2024-07-26 PROCEDURE — 97535 SELF CARE MNGMENT TRAINING: CPT | Mod: GO | Performed by: OCCUPATIONAL THERAPIST

## 2024-07-26 NOTE — PROGRESS NOTES
OCCUPATIONAL THERAPY EVALUATION  Type of Visit: Evaluation         Subjective      Presenting condition or subjective complaint: Hand evaluation per orthos instructions 8 months ago noticed numbness in right ulnar dorsal hand that is worse with wrist flexion and will get a burning pain.  Will also have numbness on tip radial side of index but this has gotten better  Date of onset: 11/08/23    Relevant medical history:     Dates & types of surgery:      Prior diagnostic imaging/testing results: X-ray; EMG     Prior therapy history for the same diagnosis, illness or injury: No  , night splinting    Prior Level of Function  Transfers: Independent  Ambulation: Independent  ADL: Independent  IADL:  Independent    Living Environment  Social support: With a significant other or spouse   Type of home: House   Stairs to enter the home: No       Ramp: No   Stairs inside the home: Yes 2 Is there a railing: No     Help at home: None  Equipment owned:       Employment: Yes   Hobbies/Interests:  kids    Patient goals for therapy:  Patient would like to pull laundry out of washer without pain    Pain assessment: See objective evaluation for additional pain details     Objective   ADDITIONAL HISTORY:  Right hand dominant  Patient reports symptoms of pain, edema, and numbness  Transportation: drives  Currently working in normal job without restrictions    Functional Outcome Measure:   Upper Extremity Functional Index Score:  SCORE:   Column Totals: /80: 77   (A lower score indicates greater disability.)     PAIN:  Pain Level at Rest: 0/10  Pain Level with Use: 9/10  Pain Location: points to dorsal MCP area of right hand  Pain Quality: Burning  Pain Frequency: intermittent  Pain is Worst: daytime  Pain is Exacerbated By: bending wrist  Pain is Relieved By: rest  Pain Progression: Worsened        EDEMA: DWC : Right 18.4, Left 17.5 Right MCP's 21.3, left 20 cm         SENSATION: reported numbness dorsal MCP area ,  monofilament test normal  2.83 ulnar and median nerve distributions        ROM:   Wrist ROM  Left AROM Right AROM    Extension 65 50   Flexion 65 65   Can make full fist    OBSERVATIONS/APPEARANCE:  does have mild observable swelling on right side vs left      SPECIAL TESTS:  Phalens, tinnels negative        STRENGTH:     Measured in pounds 7/26/2024 7/26/2024    Left Right   Average 62 58     Lateral Pinch  Measured in pounds 7/26/2024 7/26/2024    Left Right   Average 18 22     3 Point Pinch  Measured in pounds 7/26/2024 7/26/2024    Left Right                  Average 17 19     Wrist and hand MMT 5/5 pain free  PALPATION: could not reproduce symptoms with palpation           Assessment & Plan   CLINICAL IMPRESSIONS  Medical Diagnosis: Right hand Paresthesias    Treatment Diagnosis: pain, paresthesia affecting IADL's    Impression/Assessment: Pt is a 43 year old female presenting to Occupational Therapy due to above diagnoses.  The following significant findings have been identified: Impaired ROM, Impaired sensation, and Pain.  These identified deficits interfere with their ability to perform self care tasks and household chores as compared to previous level of function.     Clinical Decision Making (Complexity):  Assessment of Occupational Performance: 1-3 Performance Deficits  Occupational Performance Limitations: dressing and laundry  Clinical Decision Making (Complexity): Low complexity    PLAN OF CARE  Treatment Interventions:  Therapeutic Exercise:  Tendon Gliding  Neuromuscular re-education:  Nerve Gliding  Self Care:  Self Care Tasks and Ergonomic Considerations    Long Term Goals   OT Goal 1  Goal Identifier: home program  Goal Description: demonstrate independence with HEP  Rationale: In order to maximize safety and independence with ADL/IADLs  Target Date: 07/26/24  Date Met: 07/26/24      Frequency of Treatment: 1x  Duration of Treatment: 1 week     Recommended Referrals to Other Professionals:    Education Assessment: Learner/Method: Patient;Listening;Reading;Demonstration;Pictures/Video;No Barriers to Learning  Education Comments: PTRX on  phone     Risks and benefits of evaluation/treatment have been explained.   Patient/Family/caregiver agrees with Plan of Care.     Evaluation Time:    OT Ligia Low Complexity Minutes (20706): 25       Signing Clinician: TIMMY Hector/L CHT  Occupational Therapist, Certified Hand Therapist

## 2024-07-26 NOTE — TELEPHONE ENCOUNTER
See [employer] message/refill request.    Routing refill request to provider for review/approval because:  Drug not on the [facility] refill protocol     Christy Martin RN  [facility]

## 2024-07-26 NOTE — TELEPHONE ENCOUNTER
EMG results from [facility] are ready for review.     Herlinda Eddy ATC, CSCS  Dr. Stout's Clinical Coordinator  [facility] Sports Medicine Team

## 2024-07-27 RX ORDER — ZOLPIDEM TARTRATE 5 MG/1
5 TABLET ORAL
Qty: 60 TABLET | Refills: 0 | Status: SHIPPED | OUTPATIENT
Start: 2024-08-01 | End: 2024-09-30

## 2024-09-05 ENCOUNTER — HOSPITAL ENCOUNTER (OUTPATIENT)
Dept: MRI IMAGING | Facility: CLINIC | Age: 44
Discharge: HOME | End: 2024-09-05
Attending: PEDIATRICS | Admitting: PEDIATRICS
Payer: COMMERCIAL

## 2024-09-05 DIAGNOSIS — M25.531 RIGHT WRIST PAIN: ICD-10-CM

## 2024-09-05 DIAGNOSIS — R20.2 RIGHT HAND PARESTHESIA: ICD-10-CM

## 2024-09-05 DIAGNOSIS — M79.641 RIGHT HAND PAIN: ICD-10-CM

## 2024-09-05 PROCEDURE — 73218 MRI UPPER EXTREMITY W/O DYE: CPT | Mod: 26 | Performed by: RADIOLOGY

## 2024-09-05 PROCEDURE — 73221 MRI JOINT UPR EXTREM W/O DYE: CPT | Mod: 26 | Performed by: RADIOLOGY

## 2024-09-05 PROCEDURE — 73221 MRI JOINT UPR EXTREM W/O DYE: CPT | Mod: RT

## 2024-09-05 PROCEDURE — 73218 MRI UPPER EXTREMITY W/O DYE: CPT | Mod: RT

## 2024-09-20 ENCOUNTER — OFFICE VISIT (OUTPATIENT)
Dept: ORTHOPEDICS | Facility: CLINIC | Age: 44
End: 2024-09-20
Payer: COMMERCIAL

## 2024-09-20 DIAGNOSIS — R20.2 LEFT HAND PARESTHESIA: ICD-10-CM

## 2024-09-20 DIAGNOSIS — R20.2 RIGHT HAND PARESTHESIA: Primary | ICD-10-CM

## 2024-09-20 DIAGNOSIS — M25.531 RIGHT WRIST PAIN: ICD-10-CM

## 2024-09-20 DIAGNOSIS — M79.641 RIGHT HAND PAIN: ICD-10-CM

## 2024-09-20 PROCEDURE — 99213 OFFICE O/P EST LOW 20 MIN: CPT | Performed by: PEDIATRICS

## 2024-09-20 NOTE — LETTER
9/20/2024      Aliza Orr  [address]  [address]      Dear Colleague,    Thank you for referring your patient, Aliza Orr, to the [facility] [place]. Please see a copy of my visit note below.    ASSESSMENT & PLAN    Aliza was seen today for pain and numbness.    Diagnoses and all orders for this visit:    Right hand paresthesia  -     Adult Neurology  Referral; Future    Right hand pain  -     Adult Neurology  Referral; Future    Right wrist pain  -     Adult Neurology  Referral; Future    Left hand paresthesia  -     Adult Neurology  Referral; Future      This issue is chronic and Unchanged.      ICD-10-CM    1. Right hand paresthesia  R20.2 Adult Neurology  Referral      2. Right hand pain  M79.641 Adult Neurology  Referral      3. Right wrist pain  M25.531 Adult Neurology  Referral      4. Left hand paresthesia  R20.2 Adult Neurology  Referral        Patient Instructions   Hand numbness, pain has improved. Unremarkable work up thus far including EMG, MRI and OT evaluation. Given becoming more widespread, discussed neurology referral next step.    Plan:  - Today's Plan of Care:  Referral to Neurology    Discussed activity considerations and other supportive care including Ice/Heat, OTC and other topical medications as needed.    -We also discussed other future treatment options:  Referral to Sports Medicine at [facility]    Follow Up: as needed, pending Neurology referral    Concerning signs and symptoms were reviewed and all questions were answered at this time.    Mary Stout MD [facility]  Sports Medicine Physician  [facility]    SUBJECTIVE- Interim History September 20, 2024    Chief Complaint   Patient presents with     Right Hand - Pain, Numbness       Aliza Orr is a 44 year old female who is seen in f/u up for    Right hand paresthesia  Right hand pain  Right wrist  "pain  Left hand paresthesia.  Since last visit on 7/24/24 and in person OV on 6/19/24 patient has persistent symptoms. The sharp pain is gone, but she is still having numbness on the ulnar dorsal hand. She also has index finger numbness. She is now experiencing the same symptoms on her left index finger. .  EMG has been completed at [facility], She has done physical therapy. She is here to review MRI.  - Now ~ 9 months from initial onset    Some toe numbness, an area of numbness on her back. No neck pain, shooting pain into arms, dizziness, headaches.    Location of Pain: right hand/wrist;   Worsened by: wrist flexion, pronation/supination, index finger extension, denies worsening at night  Better with: elevation, \"shaking it\"   Treatments tried: previous imaging (xray 6/19/24, MRI 9/5/24, EMG done at [facility] )  Associated symptoms: swelling, numbness, tingling, warmth, weakness of right hand, feeling of instability, and intense pain  - numbness dorsal fourth and fifth digits, some on the radial first digit.  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   General: healthy, alert and in no distress  Skin: no suspicious lesions or rash.  CV: distal perfusion intact   Resp: normal respiratory effort without conversational dyspnea   Psych: normal mood and affect  Gait: NORMAL  Neuro: Normal light sensory exam of upper extremity     Bilateral Wrist and Hand exam  Inspection:       No swelling, bruising or deformity bilateral     Tender:       None - points to 4th and 5th fingers  Non Tender:       Remainder of the Wrist and Hand bilateral     ROM:       Full and symmetric active and passive range of motion of the forearm, " wrist and digits bilateral     Strength:       5/5 strength in the muscles of the hand, wrist and forearm bilateral     Special Tests:        neg (-) Tinel's test right and       neg (-) Phalen's test right     Neurovascular:       2+ radial pulses bilaterally with brisk capillary refill and      normal sensation to light touch in the radial, median and ulnar nerve distributions  - dorsal numbness right ulnar hand, radial index finger numbness bilaterally    RADIOLOGY:  Final results and radiologist's interpretation, available in the [facility] health record.  Images were reviewed with the patient in the office today.  My personal interpretation of the performed imaging:     Reviewed normal MRI 9/5/2024    Results for orders placed or performed during the hospital encounter of 09/05/24   MR Wrist Right w/o Contrast    Narrative    MR right hand and wrist without contrast 9/6/2024 8:09 AM    Techniques: Multiplanar multisequence imaging of the Right hand and  wrist was obtained without administration of intra-articular or  intravenous contrast using routine protocol.    History: eval swelling, dorsal hand and wrist pain; Right hand  paresthesia; Right hand pain; Right wrist pain    Comparison: Radiographs 6/19/2024    Findings:    Triangular Fibrocartilage: Thickened and edematous dorsal radioulnar  ligament. The substantial disc appears intact. The ulnar attachments  grossly intact.    Interosseous Ligaments:  Scapholunate ligament: Intact.  Lunatotriquetral ligament: Intact.  Carpal alignment: Normal.    Bones: No fracture, stress reaction, or osseous lesion.    Articulations:  Joint effusion: Small amount of fluid in the first carpal row and  distal radioulnar joint.  Cartilage: No hyaline cartilage defects.  Synovium: No synovial thickening.    Tendons:  Flexor tendons: Normal. No tear or tendon sheath effusion.  Extensor tendons: Normal. No tear or tendon sheath effusion.    Miscellaneous soft tissues: No volar or  dorsal ganglion cysts.    Grossly normal appearance of the median and ulnar nerves.      Impression    IMPRESSION:    1. Sprain of the dorsal distal radioulnar ligament of uncertain  acuity.    2. Grossly normal appearance of the median and ulnar nerves.    KAY HERRMANN MD (Joe)         SYSTEM ID:  [id number]   MR Hand Right w/o Contrast    Narrative    MR right hand and wrist without contrast 9/6/2024 8:09 AM    Techniques: Multiplanar multisequence imaging of the Right hand and  wrist was obtained without administration of intra-articular or  intravenous contrast using routine protocol.    History: eval swelling, dorsal hand and wrist pain; Right hand  paresthesia; Right hand pain; Right wrist pain    Comparison: Radiographs 6/19/2024    Findings:    Triangular Fibrocartilage: Thickened and edematous dorsal radioulnar  ligament. The substantial disc appears intact. The ulnar attachments  grossly intact.    Interosseous Ligaments:  Scapholunate ligament: Intact.  Lunatotriquetral ligament: Intact.  Carpal alignment: Normal.    Bones: No fracture, stress reaction, or osseous lesion.    Articulations:  Joint effusion: Small amount of fluid in the first carpal row and  distal radioulnar joint.  Cartilage: No hyaline cartilage defects.  Synovium: No synovial thickening.    Tendons:  Flexor tendons: Normal. No tear or tendon sheath effusion.  Extensor tendons: Normal. No tear or tendon sheath effusion.    Miscellaneous soft tissues: No volar or dorsal ganglion cysts.    Grossly normal appearance of the median and ulnar nerves.      Impression    IMPRESSION:    1. Sprain of the dorsal distal radioulnar ligament of uncertain  acuity.    2. Grossly normal appearance of the median and ulnar nerves.    KAY HERRMANN MD (Joe)         SYSTEM ID:  [id number]             Review of prior external note(s) from - OT  Review of the result(s) of each unique test - MRI and EMG             Again, thank you for allowing me to  participate in the care of your patient.        Sincerely,        Mary Stout MD

## 2024-09-20 NOTE — PROGRESS NOTES
ASSESSMENT & PLAN    Aliza was seen today for pain and numbness.    Diagnoses and all orders for this visit:    Right hand paresthesia  -     Adult Neurology  Referral; Future    Right hand pain  -     Adult Neurology  Referral; Future    Right wrist pain  -     Adult Neurology  Referral; Future    Left hand paresthesia  -     Adult Neurology  Referral; Future      This issue is chronic and Unchanged.      ICD-10-CM    1. Right hand paresthesia  R20.2 Adult Neurology  Referral      2. Right hand pain  M79.641 Adult Neurology  Referral      3. Right wrist pain  M25.531 Adult Neurology  Referral      4. Left hand paresthesia  R20.2 Adult Neurology  Referral        Patient Instructions   Hand numbness, pain has improved. Unremarkable work up thus far including EMG, MRI and OT evaluation. Given becoming more widespread, discussed neurology referral next step.    Plan:  - Today's Plan of Care:  Referral to Neurology    Discussed activity considerations and other supportive care including Ice/Heat, OTC and other topical medications as needed.    -We also discussed other future treatment options:  Referral to Sports Medicine at [place]    Follow Up: as needed, pending Neurology referral    Concerning signs and symptoms were reviewed and all questions were answered at this time.    Mary Stout MD [facility]  Sports Medicine Physician  [facility]    SUBJECTIVE- Interim History September 20, 2024    Chief Complaint   Patient presents with    Right Hand - Pain, Numbness       Aliza Orr is a 44 year old female who is seen in f/u up for    Right hand paresthesia  Right hand pain  Right wrist pain  Left hand paresthesia.  Since last visit on 7/24/24 and in person OV on 6/19/24 patient has persistent symptoms. The sharp pain is gone, but she is still having numbness on the ulnar dorsal hand. She also has index finger numbness. She is now  "experiencing the same symptoms on her left index finger. .  EMG has been completed at [facility], She has done physical therapy. She is here to review MRI.  - Now ~ 9 months from initial onset    Some toe numbness, an area of numbness on her back. No neck pain, shooting pain into arms, dizziness, headaches.    Location of Pain: right hand/wrist;   Worsened by: wrist flexion, pronation/supination, index finger extension, denies worsening at night  Better with: elevation, \"shaking it\"   Treatments tried: previous imaging (xray 6/19/24, MRI 9/5/24, EMG done at [facility] )  Associated symptoms: swelling, numbness, tingling, warmth, weakness of right hand, feeling of instability, and intense pain  - numbness dorsal fourth and fifth digits, some on the radial first digit.  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   General: healthy, alert and in no distress  Skin: no suspicious lesions or rash.  CV: distal perfusion intact   Resp: normal respiratory effort without conversational dyspnea   Psych: normal mood and affect  Gait: NORMAL  Neuro: Normal light sensory exam of upper extremity     Bilateral Wrist and Hand exam  Inspection:       No swelling, bruising or deformity bilateral     Tender:       None - points to 4th and 5th fingers  Non Tender:       Remainder of the Wrist and Hand bilateral     ROM:       Full and symmetric active and passive range of motion of the forearm, wrist and digits bilateral     Strength:       5/5 strength in the muscles of the hand, wrist and forearm bilateral     Special Tests:        neg (-) Tinel's test right and       neg (-) Phalen's test right     Neurovascular:       2+ radial pulses " bilaterally with brisk capillary refill and      normal sensation to light touch in the radial, median and ulnar nerve distributions  - dorsal numbness right ulnar hand, radial index finger numbness bilaterally    RADIOLOGY:  Final results and radiologist's interpretation, available in the [facility] health record.  Images were reviewed with the patient in the office today.  My personal interpretation of the performed imaging:     Reviewed normal MRI 9/5/2024    Results for orders placed or performed during the hospital encounter of 09/05/24   MR Wrist Right w/o Contrast    Narrative    MR right hand and wrist without contrast 9/6/2024 8:09 AM    Techniques: Multiplanar multisequence imaging of the Right hand and  wrist was obtained without administration of intra-articular or  intravenous contrast using routine protocol.    History: eval swelling, dorsal hand and wrist pain; Right hand  paresthesia; Right hand pain; Right wrist pain    Comparison: Radiographs 6/19/2024    Findings:    Triangular Fibrocartilage: Thickened and edematous dorsal radioulnar  ligament. The substantial disc appears intact. The ulnar attachments  grossly intact.    Interosseous Ligaments:  Scapholunate ligament: Intact.  Lunatotriquetral ligament: Intact.  Carpal alignment: Normal.    Bones: No fracture, stress reaction, or osseous lesion.    Articulations:  Joint effusion: Small amount of fluid in the first carpal row and  distal radioulnar joint.  Cartilage: No hyaline cartilage defects.  Synovium: No synovial thickening.    Tendons:  Flexor tendons: Normal. No tear or tendon sheath effusion.  Extensor tendons: Normal. No tear or tendon sheath effusion.    Miscellaneous soft tissues: No volar or dorsal ganglion cysts.    Grossly normal appearance of the median and ulnar nerves.      Impression    IMPRESSION:    1. Sprain of the dorsal distal radioulnar ligament of uncertain  acuity.    2. Grossly normal appearance of the median and ulnar  nerves.    KAY HERRMANN MD (Joe)         SYSTEM ID:  [id number]   MR Hand Right w/o Contrast    Narrative    MR right hand and wrist without contrast 9/6/2024 8:09 AM    Techniques: Multiplanar multisequence imaging of the Right hand and  wrist was obtained without administration of intra-articular or  intravenous contrast using routine protocol.    History: eval swelling, dorsal hand and wrist pain; Right hand  paresthesia; Right hand pain; Right wrist pain    Comparison: Radiographs 6/19/2024    Findings:    Triangular Fibrocartilage: Thickened and edematous dorsal radioulnar  ligament. The substantial disc appears intact. The ulnar attachments  grossly intact.    Interosseous Ligaments:  Scapholunate ligament: Intact.  Lunatotriquetral ligament: Intact.  Carpal alignment: Normal.    Bones: No fracture, stress reaction, or osseous lesion.    Articulations:  Joint effusion: Small amount of fluid in the first carpal row and  distal radioulnar joint.  Cartilage: No hyaline cartilage defects.  Synovium: No synovial thickening.    Tendons:  Flexor tendons: Normal. No tear or tendon sheath effusion.  Extensor tendons: Normal. No tear or tendon sheath effusion.    Miscellaneous soft tissues: No volar or dorsal ganglion cysts.    Grossly normal appearance of the median and ulnar nerves.      Impression    IMPRESSION:    1. Sprain of the dorsal distal radioulnar ligament of uncertain  acuity.    2. Grossly normal appearance of the median and ulnar nerves.    KAY HERRMANN MD (Joe)         SYSTEM ID:  [id number]             Review of prior external note(s) from - OT  Review of the result(s) of each unique test - MRI and EMG

## 2024-09-20 NOTE — PATIENT INSTRUCTIONS
Hand numbness, pain has improved. Unremarkable work up thus far including EMG, MRI and OT evaluation. Given becoming more widespread, discussed neurology referral next step.    Plan:  - Today's Plan of Care:  Referral to Neurology    Discussed activity considerations and other supportive care including Ice/Heat, OTC and other topical medications as needed.    -We also discussed other future treatment options:  Referral to Sports Medicine at [place]    Follow Up: as needed, pending Neurology referral    If you have any further questions for your physician or physician s care team you can call [phone].

## 2024-09-29 DIAGNOSIS — G47.00 PERSISTENT INSOMNIA: ICD-10-CM

## 2024-09-30 RX ORDER — ZOLPIDEM TARTRATE 5 MG/1
5 TABLET ORAL
Qty: 60 TABLET | Refills: 0 | Status: SHIPPED | OUTPATIENT
Start: 2024-09-30

## 2024-09-30 NOTE — TELEPHONE ENCOUNTER
Pending Prescriptions:                       Disp   Refills    zolpidem (AMBIEN) 5 MG tablet [Pharmacy Me*60 tab*0        Sig: Take 1 tablet (5 mg) by mouth nightly as needed for           sleep    Routing refill request to provider for review/approval because:  Drug not on the [facility] refill protocol     Christy Martin RN  [facility]

## 2024-11-25 DIAGNOSIS — G47.00 PERSISTENT INSOMNIA: ICD-10-CM

## 2024-11-26 RX ORDER — ZOLPIDEM TARTRATE 5 MG/1
TABLET ORAL
Qty: 60 TABLET | Refills: 0 | Status: SHIPPED | OUTPATIENT
Start: 2024-11-29

## 2024-11-26 NOTE — TELEPHONE ENCOUNTER
Requested Prescriptions   Pending Prescriptions Disp Refills    zolpidem (AMBIEN) 5 MG tablet [Pharmacy Med Name: ZOLPIDEM TARTRATE 5MG TABS] 60 tablet 0     Sig: TAKE ONE TABLET (5 MG) BY MOUTH NIGHTLY AS NEEDED FOR SLEEP       There is no refill protocol information for this order

## 2025-01-21 ENCOUNTER — MYC REFILL (OUTPATIENT)
Dept: FAMILY MEDICINE | Facility: CLINIC | Age: 45
End: 2025-01-21
Payer: COMMERCIAL

## 2025-01-21 DIAGNOSIS — G47.00 PERSISTENT INSOMNIA: ICD-10-CM

## 2025-01-22 RX ORDER — ZOLPIDEM TARTRATE 5 MG/1
5 TABLET ORAL
Qty: 60 TABLET | Refills: 0 | Status: SHIPPED | OUTPATIENT
Start: 2025-01-22

## 2025-01-22 NOTE — TELEPHONE ENCOUNTER
Requested Prescriptions   Pending Prescriptions Disp Refills    zolpidem (AMBIEN) 5 MG tablet 60 tablet 0       There is no refill protocol information for this order          Julie Behrendt RN

## 2025-01-26 DIAGNOSIS — I10 BENIGN ESSENTIAL HYPERTENSION: ICD-10-CM

## 2025-01-27 RX ORDER — LOSARTAN POTASSIUM 50 MG/1
50 TABLET ORAL DAILY
Qty: 90 TABLET | Refills: 1 | Status: SHIPPED | OUTPATIENT
Start: 2025-01-27

## 2025-03-25 DIAGNOSIS — G47.00 PERSISTENT INSOMNIA: ICD-10-CM

## 2025-03-26 RX ORDER — ZOLPIDEM TARTRATE 5 MG/1
5 TABLET ORAL
Qty: 60 TABLET | Refills: 0 | Status: SHIPPED | OUTPATIENT
Start: 2025-03-26

## 2025-05-27 ENCOUNTER — MYC REFILL (OUTPATIENT)
Dept: FAMILY MEDICINE | Facility: CLINIC | Age: 45
End: 2025-05-27
Payer: COMMERCIAL

## 2025-05-27 DIAGNOSIS — G47.00 PERSISTENT INSOMNIA: ICD-10-CM

## 2025-05-28 RX ORDER — ZOLPIDEM TARTRATE 5 MG/1
5 TABLET ORAL
Qty: 7 TABLET | Refills: 0 | Status: SHIPPED | OUTPATIENT
Start: 2025-05-28

## 2025-05-31 SDOH — HEALTH STABILITY: PHYSICAL HEALTH: ON AVERAGE, HOW MANY MINUTES DO YOU ENGAGE IN EXERCISE AT THIS LEVEL?: 20 MIN

## 2025-05-31 SDOH — HEALTH STABILITY: PHYSICAL HEALTH: ON AVERAGE, HOW MANY DAYS PER WEEK DO YOU ENGAGE IN MODERATE TO STRENUOUS EXERCISE (LIKE A BRISK WALK)?: 2 DAYS

## 2025-05-31 ASSESSMENT — SOCIAL DETERMINANTS OF HEALTH (SDOH): HOW OFTEN DO YOU GET TOGETHER WITH FRIENDS OR RELATIVES?: TWICE A WEEK

## 2025-06-02 ENCOUNTER — OFFICE VISIT (OUTPATIENT)
Dept: FAMILY MEDICINE | Facility: CLINIC | Age: 45
End: 2025-06-02
Payer: COMMERCIAL

## 2025-06-02 VITALS
WEIGHT: 239.2 LBS | SYSTOLIC BLOOD PRESSURE: 110 MMHG | BODY MASS INDEX: 39.85 KG/M2 | TEMPERATURE: 98 F | RESPIRATION RATE: 20 BRPM | HEIGHT: 65 IN | OXYGEN SATURATION: 98 % | HEART RATE: 82 BPM | DIASTOLIC BLOOD PRESSURE: 84 MMHG

## 2025-06-02 DIAGNOSIS — Z12.39 ENCOUNTER FOR SCREENING FOR MALIGNANT NEOPLASM OF BREAST, UNSPECIFIED SCREENING MODALITY: ICD-10-CM

## 2025-06-02 DIAGNOSIS — I10 BENIGN ESSENTIAL HYPERTENSION: ICD-10-CM

## 2025-06-02 DIAGNOSIS — G47.00 PERSISTENT INSOMNIA: ICD-10-CM

## 2025-06-02 DIAGNOSIS — Z00.00 ROUTINE GENERAL MEDICAL EXAMINATION AT A HEALTH CARE FACILITY: ICD-10-CM

## 2025-06-02 DIAGNOSIS — R20.2 PARESTHESIA: ICD-10-CM

## 2025-06-02 LAB
ANION GAP SERPL CALCULATED.3IONS-SCNC: 11 MMOL/L (ref 7–15)
BUN SERPL-MCNC: 10.2 MG/DL (ref 6–20)
CALCIUM SERPL-MCNC: 9.1 MG/DL (ref 8.8–10.4)
CHLORIDE SERPL-SCNC: 99 MMOL/L (ref 98–107)
CREAT SERPL-MCNC: 0.82 MG/DL (ref 0.51–0.95)
EGFRCR SERPLBLD CKD-EPI 2021: 89 ML/MIN/1.73M2
ERYTHROCYTE [DISTWIDTH] IN BLOOD BY AUTOMATED COUNT: 12.1 % (ref 10–15)
EST. AVERAGE GLUCOSE BLD GHB EST-MCNC: 108 MG/DL
GLUCOSE SERPL-MCNC: 118 MG/DL (ref 70–99)
HBA1C MFR BLD: 5.4 % (ref 0–5.6)
HCO3 SERPL-SCNC: 26 MMOL/L (ref 22–29)
HCT VFR BLD AUTO: 37.9 % (ref 35–47)
HGB BLD-MCNC: 12.4 G/DL (ref 11.7–15.7)
MCH RBC QN AUTO: 28.2 PG (ref 26.5–33)
MCHC RBC AUTO-ENTMCNC: 32.7 G/DL (ref 31.5–36.5)
MCV RBC AUTO: 86 FL (ref 78–100)
PLATELET # BLD AUTO: 319 10E3/UL (ref 150–450)
POTASSIUM SERPL-SCNC: 4.4 MMOL/L (ref 3.4–5.3)
RBC # BLD AUTO: 4.4 10E6/UL (ref 3.8–5.2)
SODIUM SERPL-SCNC: 136 MMOL/L (ref 135–145)
TSH SERPL DL<=0.005 MIU/L-ACNC: 1.47 UIU/ML (ref 0.3–4.2)
WBC # BLD AUTO: 8.8 10E3/UL (ref 4–11)

## 2025-06-02 PROCEDURE — 83036 HEMOGLOBIN GLYCOSYLATED A1C: CPT | Performed by: FAMILY MEDICINE

## 2025-06-02 PROCEDURE — 99214 OFFICE O/P EST MOD 30 MIN: CPT | Mod: 25 | Performed by: FAMILY MEDICINE

## 2025-06-02 PROCEDURE — 3079F DIAST BP 80-89 MM HG: CPT | Performed by: FAMILY MEDICINE

## 2025-06-02 PROCEDURE — 1126F AMNT PAIN NOTED NONE PRSNT: CPT | Performed by: FAMILY MEDICINE

## 2025-06-02 PROCEDURE — 3074F SYST BP LT 130 MM HG: CPT | Performed by: FAMILY MEDICINE

## 2025-06-02 PROCEDURE — G2211 COMPLEX E/M VISIT ADD ON: HCPCS | Performed by: FAMILY MEDICINE

## 2025-06-02 PROCEDURE — 85027 COMPLETE CBC AUTOMATED: CPT | Performed by: FAMILY MEDICINE

## 2025-06-02 PROCEDURE — 36415 COLL VENOUS BLD VENIPUNCTURE: CPT | Performed by: FAMILY MEDICINE

## 2025-06-02 PROCEDURE — 84443 ASSAY THYROID STIM HORMONE: CPT | Performed by: FAMILY MEDICINE

## 2025-06-02 PROCEDURE — 82607 VITAMIN B-12: CPT | Performed by: FAMILY MEDICINE

## 2025-06-02 PROCEDURE — 99396 PREV VISIT EST AGE 40-64: CPT | Performed by: FAMILY MEDICINE

## 2025-06-02 PROCEDURE — 80048 BASIC METABOLIC PNL TOTAL CA: CPT | Performed by: FAMILY MEDICINE

## 2025-06-02 RX ORDER — LOSARTAN POTASSIUM 50 MG/1
50 TABLET ORAL DAILY
Qty: 90 TABLET | Refills: 3 | Status: SHIPPED | OUTPATIENT
Start: 2025-06-02

## 2025-06-02 RX ORDER — ZOLPIDEM TARTRATE 5 MG/1
5 TABLET ORAL
Qty: 30 TABLET | Refills: 5 | Status: SHIPPED | OUTPATIENT
Start: 2025-06-02

## 2025-06-02 ASSESSMENT — PAIN SCALES - GENERAL: PAINLEVEL_OUTOF10: NO PAIN (0)

## 2025-06-02 NOTE — PATIENT INSTRUCTIONS
Patient Education   Preventive Care Advice   This is general advice given by our system to help you stay healthy. However, your care team may have specific advice just for you. Please talk to your care team about your preventive care needs.  Nutrition  Eat 5 or more servings of fruits and vegetables each day.  Try wheat bread, brown rice and whole grain pasta (instead of white bread, rice, and pasta).  Get enough calcium and vitamin D. Check the label on foods and aim for 100% of the RDA (recommended daily allowance).  Lifestyle  Exercise at least 150 minutes each week  (30 minutes a day, 5 days a week).  Do muscle strengthening activities 2 days a week. These help control your weight and prevent disease.  No smoking.  Wear sunscreen to prevent skin cancer.  Have a dental exam and cleaning every 6 months.  Yearly exams  See your health care team every year to talk about:  Any changes in your health.  Any medicines your care team has prescribed.  Preventive care, family planning, and ways to prevent chronic diseases.  Shots (vaccines)   HPV shots (up to age 26), if you've never had them before.  Hepatitis B shots (up to age 59), if you've never had them before.  COVID-19 shot: Get this shot when it's due.  Flu shot: Get a flu shot every year.  Tetanus shot: Get a tetanus shot every 10 years.  Pneumococcal, hepatitis A, and RSV shots: Ask your care team if you need these based on your risk.  Shingles shot (for age 50 and up)  General health tests  Diabetes screening:  Starting at age 35, Get screened for diabetes at least every 3 years.  If you are younger than age 35, ask your care team if you should be screened for diabetes.  Cholesterol test: At age 39, start having a cholesterol test every 5 years, or more often if advised.  Bone density scan (DEXA): At age 50, ask your care team if you should have this scan for osteoporosis (brittle bones).  Hepatitis C: Get tested at least once in your life.  STIs (sexually  transmitted infections)  Before age 24: Ask your care team if you should be screened for STIs.  After age 24: Get screened for STIs if you're at risk. You are at risk for STIs (including HIV) if:  You are sexually active with more than one person.  You don't use condoms every time.  You or a partner was diagnosed with a sexually transmitted infection.  If you are at risk for HIV, ask about PrEP medicine to prevent HIV.  Get tested for HIV at least once in your life, whether you are at risk for HIV or not.  Cancer screening tests  Cervical cancer screening: If you have a cervix, begin getting regular cervical cancer screening tests starting at age 21.  Breast cancer scan (mammogram): If you've ever had breasts, begin having regular mammograms starting at age 40. This is a scan to check for breast cancer.  Colon cancer screening: It is important to start screening for colon cancer at age 45.  Have a colonoscopy test every 10 years (or more often if you're at risk) Or, ask your provider about stool tests like a FIT test every year or Cologuard test every 3 years.  To learn more about your testing options, visit:   .  For help making a decision, visit:   https://bit.ly/zc48985.  Prostate cancer screening test: If you have a prostate, ask your care team if a prostate cancer screening test (PSA) at age 55 is right for you.  Lung cancer screening: If you are a current or former smoker ages 50 to 80, ask your care team if ongoing lung cancer screenings are right for you.  For informational purposes only. Not to replace the advice of your health care provider. Copyright   2023 [employer]. All rights reserved. Clinically reviewed by the [facility] Transitions Program. [employer] [id number] - REV 01/24.

## 2025-06-02 NOTE — PROGRESS NOTES
"Preventive Care Visit  [facility]  Rudy Galicia MD, Family Medicine  Jun 2, 2025      Assessment & Plan     Routine general medical examination at a health care facility  C/o paresthesia involving left foot which she has been experiencing recently.  Previously was having similar symptoms involving right hand.  Blood workup, EMG and imaging results were unremarkable.  Routine labs ordered and neurology referral placed.  Recommended regular exercise, healthy diet and weight loss    Benign essential hypertension  Blood pressure within target goal of less than 140/90.  Losartan refilled  - losartan (COZAAR) 50 MG tablet; Take 1 tablet (50 mg) by mouth daily.    Persistent insomnia  Zolpidem refilled, judicious use stressed  - zolpidem (AMBIEN) 5 MG tablet; Take 1 tablet (5 mg) by mouth nightly as needed for sleep.    Paresthesia  As above  - CBC with platelets; Future  - Basic metabolic panel  (Ca, Cl, CO2, Creat, Gluc, K, Na, BUN); Future  - Hemoglobin A1c; Future  - Vitamin B12; Future  - TSH with free T4 reflex; Future  - Adult Neurology  Referral; Future    Encounter for screening for malignant neoplasm of breast, unspecified screening modality  - MA Screen Bilateral w/Mauro; Future      BMI  Estimated body mass index is 39.8 kg/m  as calculated from the following:    Height as of this encounter: 1.651 m (5' 5\").    Weight as of this encounter: 108.5 kg (239 lb 3.2 oz).   Weight management plan: Discussed healthy diet and exercise guidelines    Counseling  Appropriate preventive services were addressed with this patient via screening, questionnaire, or discussion as appropriate for fall prevention, nutrition, physical activity, Tobacco-use cessation, social engagement, weight loss and cognition.  Checklist reviewing preventive services available has been given to the patient.  Reviewed patient's diet, addressing concerns and/or questions.   She is at risk for lack of exercise " and has been provided with information to increase physical activity for the benefit of her well-being.       Tere Abrams is a 44 year old, presenting for the following:  Physical      HPI     Advance Care Planning    Discussed advance care planning with patient; however, patient declined at this time.        5/31/2025   General Health   How would you rate your overall physical health? Good   Feel stress (tense, anxious, or unable to sleep) Not at all         5/31/2025   Nutrition   Three or more servings of calcium each day? Yes   Diet: Regular (no restrictions)   How many servings of fruit and vegetables per day? (!) 0-1   How many sweetened beverages each day? 0-1         5/31/2025   Exercise   Days per week of moderate/strenous exercise 2 days   Average minutes spent exercising at this level 20 min   (!) EXERCISE CONCERN      5/31/2025   Social Factors   Frequency of gathering with friends or relatives Twice a week   Worry food won't last until get money to buy more No   Food not last or not have enough money for food? No   Do you have housing? (Housing is defined as stable permanent housing and does not include staying outside in a car, in a tent, in an abandoned building, in an overnight shelter, or couch-surfing.) Yes   Are you worried about losing your housing? No   Lack of transportation? No   Unable to get utilities (heat,electricity)? No         5/31/2025   Dental   Dentist two times every year? Yes         Today's PHQ-2 Score:       6/2/2025    11:19 AM   PHQ-2 ( 1999 [employer])   Q1: Little interest or pleasure in doing things 0   Q2: Feeling down, depressed or hopeless 0   PHQ-2 Score 0    Q1: Little interest or pleasure in doing things Not at all   Q2: Feeling down, depressed or hopeless Not at all   PHQ-2 Score 0       Patient-reported           5/31/2025   Substance Use   Alcohol more than 3/day or more than 7/wk No   Do you use any other substances recreationally? No     Social History      Tobacco Use    Smoking status: Former     Current packs/day: 0.00     Average packs/day: 0.5 packs/day for 15.0 years (7.5 ttl pk-yrs)     Types: Cigarettes     Start date: 1993     Quit date: 2008     Years since quittin.4    Smokeless tobacco: Never   Vaping Use    Vaping status: Never Used   Substance Use Topics    Alcohol use: Yes     Comment: Few beers a week    Drug use: No           3/13/2024   LAST FHS-7 RESULTS   1st degree relative breast or ovarian cancer No   Any relative bilateral breast cancer No   Any male have breast cancer No   Any ONE woman have BOTH breast AND ovarian cancer No   Any woman with breast cancer before 50yrs No   2 or more relatives with breast AND/OR ovarian cancer No   2 or more relatives with breast AND/OR bowel cancer No                2025   STI Screening   New sexual partner(s) since last STI/HIV test? No     History of  Pap smear:         Latest Ref Rng & Units 3/30/2023     2:10 PM 9/15/2020     7:05 PM 9/15/2020     4:00 PM   PAP / HPV   PAP  Negative for Intraepithelial Lesion or Malignancy (NILM)      PAP (Historical)   NIL     HPV 16 DNA Negative Negative   Negative    HPV 18 DNA Negative Negative   Negative    Other HR HPV Negative Negative   Negative      ASCVD Risk   The 10-year ASCVD risk score (Stephania RAE, et al., 2019) is: 0.4%    Values used to calculate the score:      Age: 44 years      Sex: Female      Is Non- : No      Diabetic: No      Tobacco smoker: No      Systolic Blood Pressure: 110 mmHg      Is BP treated: Yes      HDL Cholesterol: 72 mg/dL      Total Cholesterol: 178 mg/dL       Reviewed and updated as needed this visit by Provider                    Past Medical History:   Diagnosis Date    Benign essential hypertension 3/7/2019    Current moderate episode of major depressive disorder, unspecified whether recurrent (H) 3/30/2023    Genital herpes     HSIL (high grade squamous intraepithelial lesion)  on Pap smear 12 colp - SHUKRI 2, 3, CIS    PMDD (premenstrual dysphoric disorder) 2014     Past Surgical History:   Procedure Laterality Date    COLONOSCOPY N/A 2021    Procedure: COLONOSCOPY;  Surgeon: Addi Johnson MD;  Location: [facility]    COLONOSCOPY      HERNIORRHAPHY INCISIONAL (LOCATION) N/A 11/10/2023    Procedure: Primary Incisional Hernia Repair;  Surgeon: Marquis Butterfield DO;  Location: [facility]    LAPAROSCOPIC CHOLECYSTECTOMY N/A 11/10/2023    Procedure: CHOLECYSTECTOMY, LAPAROSCOPIC;  Surgeon: Marquis Butterfield DO;  Location: [facility]    LAPAROSCOPIC TUBAL LIGATION  2011    Procedure:LAPAROSCOPIC TUBAL LIGATION; Laparoscopic Tubal Sterlization; Surgeon:RYANNE HAWK; Location:[facility]    LEEP TX, CERVICAL  2012    SHUKRI 1,2,3    ORTHOPEDIC SURGERY      SOFT TISSUE SURGERY      Ankle surgery    SURGICAL HISTORY OF -       ankle right    TONSILLECTOMY       OB History    Para Term  AB Living   2 2 2 0 0 2   SAB IAB Ectopic Multiple Live Births   0 0 0 0 2      # Outcome Date GA Lbr Yazan/2nd Weight Sex Type Anes PTL Lv   2 Term 11/09/10 39w0d 04:57 / 00:12 3.685 kg (8 lb 2 oz) M Vag-Spont Spinal N JHON      Name: CESAR MENENDEZ      Apgar1: 7  Apgar5: 8   1 Term 08 39w1d 05:29 3.487 kg (7 lb 11 oz) M Vag-Vacuum EPI N JHON      Birth Comments: -Vacuum.      Name: Quentin      Apgar1: 8  Apgar5: 8     Lab work is in process  Labs reviewed in EPIC  BP Readings from Last 3 Encounters:   25 110/84   24 120/84   23 118/82    Wt Readings from Last 3 Encounters:   25 108.5 kg (239 lb 3.2 oz)   24 105.2 kg (232 lb)   23 105.2 kg (231 lb 14.8 oz)                  Patient Active Problem List   Diagnosis    Anal fissure    LUCAS (obstructive sleep apnea)    Insomnia    BMI 38.0-38.9,adult    CARDIOVASCULAR SCREENING; LDL GOAL LESS THAN 160    Dyspareunia    Incontinence of urine    SHUKRI 3    CIS (carcinoma  in situ of cervix)    Female pelvic pain    S/P LEEP    PMDD (premenstrual dysphoric disorder)    Menorrhagia    Persistent insomnia    Benign essential hypertension    Obesity (BMI 35.0-39.9) with comorbidity (H)    Trochanteric bursitis of both hips    Right hand paresthesia     Past Surgical History:   Procedure Laterality Date    COLONOSCOPY N/A 2021    Procedure: COLONOSCOPY;  Surgeon: Addi Johnson MD;  Location: [facility]    COLONOSCOPY      HERNIORRHAPHY INCISIONAL (LOCATION) N/A 11/10/2023    Procedure: Primary Incisional Hernia Repair;  Surgeon: Marquis Butterfield DO;  Location: [facility]    LAPAROSCOPIC CHOLECYSTECTOMY N/A 11/10/2023    Procedure: CHOLECYSTECTOMY, LAPAROSCOPIC;  Surgeon: Marquis Butterfield DO;  Location: [facility]    LAPAROSCOPIC TUBAL LIGATION  2011    Procedure:LAPAROSCOPIC TUBAL LIGATION; Laparoscopic Tubal Sterlization; Surgeon:RYANNE HAWK; Location:[facility]    LEEP TX, CERVICAL  2012    SHUKRI 1,2,3    ORTHOPEDIC SURGERY      SOFT TISSUE SURGERY      Ankle surgery    SURGICAL HISTORY OF -       ankle right    TONSILLECTOMY         Social History     Tobacco Use    Smoking status: Former     Current packs/day: 0.00     Average packs/day: 0.5 packs/day for 15.0 years (7.5 ttl pk-yrs)     Types: Cigarettes     Start date: 1993     Quit date: 2008     Years since quittin.4    Smokeless tobacco: Never   Substance Use Topics    Alcohol use: Yes     Comment: Few beers a week     Family History   Problem Relation Age of Onset    Cancer Maternal Grandmother         pancreatic    Other Cancer Maternal Grandmother         Pancreatic    Cancer Paternal Grandfather         liver,lung    Alcohol/Drug Paternal Grandfather     Other Cancer Paternal Grandfather         Liver, Lung, Lymph Node    Gastrointestinal Disease Father         ulcer    Alcohol/Drug Father     Diabetes Father     Hypertension Father     Hyperlipidemia Father     Obesity Father      "Depression Mother     Hypertension Mother     Hyperlipidemia Mother     Depression/Anxiety Mother     Obesity Mother     Heart Disease Maternal Grandfather     Alcohol/Drug Paternal Grandmother     Respiratory Daughter     Alcohol/Drug Brother     Cerebrovascular Disease Brother     Substance Abuse Brother     Coronary Artery Disease Brother          from heart disease in July at the age of 49    Hypertension Brother     Cerebrovascular Disease Brother     Depression Brother     Anxiety Disorder Brother     Substance Abuse Brother     Cerebrovascular Disease Other         Uncle         Current Outpatient Medications   Medication Sig Dispense Refill    losartan (COZAAR) 50 MG tablet TAKE ONE TABLET BY MOUTH ONCE DAILY 90 tablet 1    zolpidem (AMBIEN) 5 MG tablet Take 1 tablet (5 mg) by mouth nightly as needed for sleep. 7 tablet 0     Allergies   Allergen Reactions    Nkda [No Known Drug Allergy]      Recent Labs   Lab Test 10/05/23  0944 23  1524 21  1725 21  1725 09/15/20  1747 19  1019 19  1538   LDL  --  74  --   --   --   --  88   HDL  --  72  --   --   --   --  73   TRIG  --  160*  --   --   --   --  116   ALT 30 28  --  35 30 36 48   CR 0.78 0.77   < > 0.79 0.77 0.74 0.81   GFRESTIMATED >90 >90   < > >90 >90 >90 >90   GFRESTBLACK  --   --   --   --  >90 >90 >90   POTASSIUM 4.0 4.2  --  4.0 4.4 4.1 4.2   TSH  --  1.95  --   --  2.13  --   --     < > = values in this interval not displayed.           Review of Systems  Constitutional, neuro, ENT, endocrine, pulmonary, cardiac, gastrointestinal, genitourinary, musculoskeletal, integument and psychiatric systems are negative, except as otherwise noted.     Objective    Exam  /84   Pulse 82   Temp 98  F (36.7  C) (Tympanic)   Resp 20   Ht 1.651 m (5' 5\")   Wt 108.5 kg (239 lb 3.2 oz)   LMP 2025 (Exact Date)   SpO2 98%   BMI 39.80 kg/m     Estimated body mass index is 39.8 kg/m  as calculated from the " "following:    Height as of this encounter: 1.651 m (5' 5\").    Weight as of this encounter: 108.5 kg (239 lb 3.2 oz).  Wt Readings from Last 10 Encounters:   06/02/25 108.5 kg (239 lb 3.2 oz)   05/22/24 105.2 kg (232 lb)   11/28/23 105.2 kg (231 lb 14.8 oz)   11/10/23 105.2 kg (232 lb)   10/25/23 105.5 kg (232 lb 9.6 oz)   10/05/23 106.5 kg (234 lb 12.8 oz)   03/30/23 104.3 kg (230 lb)   11/18/21 109.8 kg (242 lb)   05/24/21 103 kg (227 lb)   05/07/21 103 kg (227 lb)        Physical Exam  GENERAL: alert and no distress  EYES: Eyes grossly normal to inspection, PERRL and conjunctivae and sclerae normal  HENT: normal cephalic/atraumatic, nose and mouth without ulcers or lesions, oropharynx clear, and oral mucous membranes moist  NECK: no adenopathy, no asymmetry, masses, or scars  RESP: lungs clear to auscultation - no rales, rhonchi or wheezes  CV: regular rates and rhythm, normal S1 S2, no S3 or S4, no murmur, click or rub, peripheral pulses strong, and no peripheral edema  ABDOMEN: soft, nontender and no organomegaly or masses  MS: no gross musculoskeletal defects noted, no edema  SKIN: no suspicious lesions or rashes  NEURO: Normal strength and tone, mentation intact and speech normal  PSYCH: mentation appears normal, affect normal/bright        Signed Electronically by: Rudy Galicia MD    "

## 2025-06-03 ENCOUNTER — RESULTS FOLLOW-UP (OUTPATIENT)
Dept: FAMILY MEDICINE | Facility: CLINIC | Age: 45
End: 2025-06-03

## 2025-06-03 LAB — VIT B12 SERPL-MCNC: 359 PG/ML (ref 232–1245)

## 2025-08-27 ENCOUNTER — MYC MEDICAL ADVICE (OUTPATIENT)
Dept: FAMILY MEDICINE | Facility: CLINIC | Age: 45
End: 2025-08-27

## 2025-08-27 ENCOUNTER — MYC REFILL (OUTPATIENT)
Dept: FAMILY MEDICINE | Facility: CLINIC | Age: 45
End: 2025-08-27

## 2025-08-27 DIAGNOSIS — I10 BENIGN ESSENTIAL HYPERTENSION: ICD-10-CM

## 2025-08-27 RX ORDER — LOSARTAN POTASSIUM 50 MG/1
50 TABLET ORAL DAILY
Qty: 90 TABLET | Refills: 3 | OUTPATIENT
Start: 2025-08-27

## 2025-09-03 ENCOUNTER — OFFICE VISIT (OUTPATIENT)
Dept: FAMILY MEDICINE | Facility: CLINIC | Age: 45
End: 2025-09-03
Payer: COMMERCIAL

## 2025-09-03 VITALS
HEIGHT: 65 IN | WEIGHT: 239 LBS | RESPIRATION RATE: 16 BRPM | HEART RATE: 84 BPM | OXYGEN SATURATION: 100 % | SYSTOLIC BLOOD PRESSURE: 110 MMHG | DIASTOLIC BLOOD PRESSURE: 70 MMHG | TEMPERATURE: 97.8 F | BODY MASS INDEX: 39.82 KG/M2

## 2025-09-03 DIAGNOSIS — N64.52 NIPPLE DISCHARGE: Primary | ICD-10-CM

## 2025-09-03 LAB
ALBUMIN SERPL BCG-MCNC: 4.4 G/DL (ref 3.5–5.2)
ALP SERPL-CCNC: 68 U/L (ref 40–150)
ALT SERPL W P-5'-P-CCNC: 20 U/L (ref 0–50)
ANION GAP SERPL CALCULATED.3IONS-SCNC: 11 MMOL/L (ref 7–15)
AST SERPL W P-5'-P-CCNC: 22 U/L (ref 0–45)
BILIRUB SERPL-MCNC: 0.2 MG/DL
BUN SERPL-MCNC: 11.9 MG/DL (ref 6–20)
CALCIUM SERPL-MCNC: 9.2 MG/DL (ref 8.8–10.4)
CHLORIDE SERPL-SCNC: 103 MMOL/L (ref 98–107)
CREAT SERPL-MCNC: 0.8 MG/DL (ref 0.51–0.95)
EGFRCR SERPLBLD CKD-EPI 2021: >90 ML/MIN/1.73M2
ERYTHROCYTE [DISTWIDTH] IN BLOOD BY AUTOMATED COUNT: 12.1 % (ref 10–15)
GLUCOSE SERPL-MCNC: 110 MG/DL (ref 70–99)
HCG SERPL QL: NEGATIVE
HCO3 SERPL-SCNC: 26 MMOL/L (ref 22–29)
HCT VFR BLD AUTO: 38.3 % (ref 35–47)
HGB BLD-MCNC: 12.5 G/DL (ref 11.7–15.7)
MCH RBC QN AUTO: 28.7 PG (ref 26.5–33)
MCHC RBC AUTO-ENTMCNC: 32.6 G/DL (ref 31.5–36.5)
MCV RBC AUTO: 88 FL (ref 78–100)
PLATELET # BLD AUTO: 311 10E3/UL (ref 150–450)
POTASSIUM SERPL-SCNC: 3.5 MMOL/L (ref 3.4–5.3)
PROT SERPL-MCNC: 7.1 G/DL (ref 6.4–8.3)
RBC # BLD AUTO: 4.35 10E6/UL (ref 3.8–5.2)
SODIUM SERPL-SCNC: 140 MMOL/L (ref 135–145)
TSH SERPL DL<=0.005 MIU/L-ACNC: 1.47 UIU/ML (ref 0.3–4.2)
WBC # BLD AUTO: 5.6 10E3/UL (ref 4–11)

## 2025-09-03 PROCEDURE — 99214 OFFICE O/P EST MOD 30 MIN: CPT | Mod: 25 | Performed by: FAMILY MEDICINE

## 2025-09-03 PROCEDURE — 80053 COMPREHEN METABOLIC PANEL: CPT | Performed by: FAMILY MEDICINE

## 2025-09-03 PROCEDURE — 90715 TDAP VACCINE 7 YRS/> IM: CPT | Performed by: FAMILY MEDICINE

## 2025-09-03 PROCEDURE — 3078F DIAST BP <80 MM HG: CPT | Performed by: FAMILY MEDICINE

## 2025-09-03 PROCEDURE — 36415 COLL VENOUS BLD VENIPUNCTURE: CPT | Performed by: FAMILY MEDICINE

## 2025-09-03 PROCEDURE — 84146 ASSAY OF PROLACTIN: CPT | Performed by: FAMILY MEDICINE

## 2025-09-03 PROCEDURE — 84703 CHORIONIC GONADOTROPIN ASSAY: CPT | Performed by: FAMILY MEDICINE

## 2025-09-03 PROCEDURE — 3074F SYST BP LT 130 MM HG: CPT | Performed by: FAMILY MEDICINE

## 2025-09-03 PROCEDURE — 90471 IMMUNIZATION ADMIN: CPT | Performed by: FAMILY MEDICINE

## 2025-09-03 PROCEDURE — 85027 COMPLETE CBC AUTOMATED: CPT | Performed by: FAMILY MEDICINE

## 2025-09-03 PROCEDURE — 84443 ASSAY THYROID STIM HORMONE: CPT | Performed by: FAMILY MEDICINE

## 2025-09-04 LAB — PROLACTIN SERPL 3RD IS-MCNC: 9 NG/ML (ref 5–23)

## (undated) DEVICE — GLOVE BIOGEL PI MICRO SZ 6.5 48565

## (undated) DEVICE — GOWN IMPERVIOUS SPECIALTY XLG/XLONG 32474

## (undated) DEVICE — ESU PENCIL W/COATED BLADE E2450H

## (undated) DEVICE — PREP CHLORAPREP 26ML TINTED ORANGE  260815

## (undated) DEVICE — Device

## (undated) DEVICE — DRAPE POUCH INSTRUMENT 3 POCKET 1018L

## (undated) DEVICE — ENDO POUCH UNIV RETRIEVAL SYSTEM INZII 10MM CD001

## (undated) DEVICE — SYSTEM LAPAROVUE VISIBILITY LAPVUE10

## (undated) DEVICE — CLIP APPLIER ENDO ROTATING 10MM MED/LG ER320

## (undated) DEVICE — BLADE KNIFE SURG 15 371115

## (undated) DEVICE — ESU ELEC CLEANCOAT LAP FLAT L-HOOK 36CM E3774-36C

## (undated) DEVICE — GLOVE BIOGEL PI MICRO SZ 7.5 48575

## (undated) DEVICE — SU MONOCRYL 4-0 PS-2 18" UND Y496G

## (undated) DEVICE — GLOVE BIOGEL PI MICRO INDICATOR UNDERGLOVE SZ 8.0 48980

## (undated) DEVICE — SU VICRYL 3-0 SH 27" UND J416H

## (undated) DEVICE — GLOVE BIOGEL PI ULTRATOUCH G SZ 7.5 42175

## (undated) DEVICE — ENDO TROCAR FIRST ENTRY KII FIOS ADV FIX 05X100MM CFF03

## (undated) DEVICE — SUCTION IRRIGATION STRYKFLOW II W/TIP DISP 250-070-520

## (undated) DEVICE — SYR EAR BULB 2OZ

## (undated) DEVICE — SOL WATER IRRIG 1000ML BOTTLE 07139-09

## (undated) DEVICE — ENDO TROCAR SLEEVE KII ADV FIXATION 05X100MM CFS02

## (undated) DEVICE — ESU HOLSTER PLASTIC DISP E2400

## (undated) DEVICE — SU VICRYL 0 UR-6 27" J603H

## (undated) DEVICE — GOWN XLG DISP 9545

## (undated) DEVICE — SOL NACL 0.9% IRRIG 1000ML BOTTLE 07138-09

## (undated) DEVICE — GLOVE BIOGEL PI MICRO INDICATOR UNDERGLOVE SZ 7.5 48975

## (undated) DEVICE — SUCTION MANIFOLD NEPTUNE 2 SYS 1 PORT 702-025-000

## (undated) DEVICE — SU DERMABOND ADVANCED .7ML DNX12

## (undated) DEVICE — STOCKING SLEEVE COMPRESSION CALF LG

## (undated) DEVICE — ENDO TROCAR FIRST ENTRY KII FIOS ADV FIX 11X100MM CFF33

## (undated) DEVICE — GLOVE BIOGEL PI MICRO INDICATOR UNDERGLOVE SZ 6.5 48965

## (undated) DEVICE — SOL NACL 0.9% IRRIG 3000ML BAG 07972-08

## (undated) DEVICE — DECANTER VIAL 2006S

## (undated) DEVICE — ESU ENDO SCISSORS 5MM CVD 5DCS

## (undated) RX ORDER — PROPOFOL 10 MG/ML
INJECTION, EMULSION INTRAVENOUS
Status: DISPENSED
Start: 2021-05-07

## (undated) RX ORDER — PROPOFOL 10 MG/ML
INJECTION, EMULSION INTRAVENOUS
Status: DISPENSED
Start: 2023-11-10

## (undated) RX ORDER — DEXAMETHASONE SODIUM PHOSPHATE 4 MG/ML
INJECTION, SOLUTION INTRA-ARTICULAR; INTRALESIONAL; INTRAMUSCULAR; INTRAVENOUS; SOFT TISSUE
Status: DISPENSED
Start: 2023-11-10

## (undated) RX ORDER — ACETAMINOPHEN 325 MG/1
TABLET ORAL
Status: DISPENSED
Start: 2023-11-10

## (undated) RX ORDER — BUPIVACAINE HYDROCHLORIDE 5 MG/ML
INJECTION, SOLUTION EPIDURAL; INTRACAUDAL
Status: DISPENSED
Start: 2023-11-10

## (undated) RX ORDER — FENTANYL CITRATE 50 UG/ML
INJECTION, SOLUTION INTRAMUSCULAR; INTRAVENOUS
Status: DISPENSED
Start: 2023-11-10

## (undated) RX ORDER — LIDOCAINE HYDROCHLORIDE AND EPINEPHRINE 10; 10 MG/ML; UG/ML
INJECTION, SOLUTION INFILTRATION; PERINEURAL
Status: DISPENSED
Start: 2023-11-10

## (undated) RX ORDER — OXYCODONE HYDROCHLORIDE 5 MG/1
TABLET ORAL
Status: DISPENSED
Start: 2023-11-10

## (undated) RX ORDER — KETOROLAC TROMETHAMINE 30 MG/ML
INJECTION, SOLUTION INTRAMUSCULAR; INTRAVENOUS
Status: DISPENSED
Start: 2023-11-10

## (undated) RX ORDER — ONDANSETRON 2 MG/ML
INJECTION INTRAMUSCULAR; INTRAVENOUS
Status: DISPENSED
Start: 2023-11-10